# Patient Record
Sex: MALE | Race: WHITE | Employment: OTHER | URBAN - METROPOLITAN AREA
[De-identification: names, ages, dates, MRNs, and addresses within clinical notes are randomized per-mention and may not be internally consistent; named-entity substitution may affect disease eponyms.]

---

## 2020-09-10 NOTE — PERIOP NOTES
Patient denied any COVID-19 symptoms or possible exposure that would require a  pre-procedural COVID-19 test; no COVID-19 test scheduled for the 9/17 Cath Lab procedure.

## 2020-09-11 ENCOUNTER — HOSPITAL ENCOUNTER (OUTPATIENT)
Dept: GENERAL RADIOLOGY | Age: 60
Discharge: HOME | End: 2020-09-11
Payer: COMMERCIAL

## 2020-09-11 DIAGNOSIS — I35.0 NODULAR CALCIFIC AORTIC VALVE STENOSIS: ICD-10-CM

## 2020-09-11 DIAGNOSIS — I25.118 ATHSCL HEART DISEASE OF NATIVE COR ART W OTH ANG PCTRS (HCC): ICD-10-CM

## 2020-09-11 DIAGNOSIS — I10 ESSENTIAL HYPERTENSION, MALIGNANT: ICD-10-CM

## 2020-09-11 PROCEDURE — 71046 X-RAY EXAM CHEST 2 VIEWS: CPT

## 2020-09-17 ENCOUNTER — HOSPITAL ENCOUNTER (OUTPATIENT)
Age: 60
Discharge: HOME | End: 2020-09-17
Attending: INTERNAL MEDICINE | Admitting: INTERNAL MEDICINE
Payer: COMMERCIAL

## 2020-09-17 VITALS
DIASTOLIC BLOOD PRESSURE: 72 MMHG | HEART RATE: 69 BPM | RESPIRATION RATE: 18 BRPM | HEIGHT: 72 IN | WEIGHT: 210 LBS | BODY MASS INDEX: 28.44 KG/M2 | OXYGEN SATURATION: 99 % | TEMPERATURE: 97.9 F | SYSTOLIC BLOOD PRESSURE: 130 MMHG

## 2020-09-17 DIAGNOSIS — R07.9 CHEST PAIN, UNSPECIFIED TYPE: ICD-10-CM

## 2020-09-17 PROCEDURE — C1894 INTRO/SHEATH, NON-LASER: HCPCS | Performed by: INTERNAL MEDICINE

## 2020-09-17 PROCEDURE — 74011250637 HC RX REV CODE- 250/637: Performed by: INTERNAL MEDICINE

## 2020-09-17 PROCEDURE — 77030029065 HC DRSG HEMO QCLOT ZMED -B: Performed by: INTERNAL MEDICINE

## 2020-09-17 PROCEDURE — 74011250636 HC RX REV CODE- 250/636: Performed by: INTERNAL MEDICINE

## 2020-09-17 PROCEDURE — C1769 GUIDE WIRE: HCPCS | Performed by: INTERNAL MEDICINE

## 2020-09-17 PROCEDURE — 99152 MOD SED SAME PHYS/QHP 5/>YRS: CPT | Performed by: INTERNAL MEDICINE

## 2020-09-17 PROCEDURE — 77030004549 HC CATH ANGI DX PRF MRTM -A: Performed by: INTERNAL MEDICINE

## 2020-09-17 PROCEDURE — 74011000250 HC RX REV CODE- 250: Performed by: INTERNAL MEDICINE

## 2020-09-17 PROCEDURE — 77030004521 HC CATH ANGI DX COOK -B: Performed by: INTERNAL MEDICINE

## 2020-09-17 PROCEDURE — 99153 MOD SED SAME PHYS/QHP EA: CPT | Performed by: INTERNAL MEDICINE

## 2020-09-17 PROCEDURE — 93458 L HRT ARTERY/VENTRICLE ANGIO: CPT | Performed by: INTERNAL MEDICINE

## 2020-09-17 PROCEDURE — 99205 OFFICE O/P NEW HI 60 MIN: CPT | Performed by: THORACIC SURGERY (CARDIOTHORACIC VASCULAR SURGERY)

## 2020-09-17 PROCEDURE — 74011000636 HC RX REV CODE- 636: Performed by: INTERNAL MEDICINE

## 2020-09-17 PROCEDURE — C1760 CLOSURE DEV, VASC: HCPCS | Performed by: INTERNAL MEDICINE

## 2020-09-17 PROCEDURE — 77030028837 HC SYR ANGI PWR INJ COEU -A: Performed by: INTERNAL MEDICINE

## 2020-09-17 RX ORDER — ASPIRIN 325 MG
325 TABLET ORAL
COMMUNITY
End: 2020-12-12

## 2020-09-17 RX ORDER — HEPARIN SODIUM 200 [USP'U]/100ML
INJECTION, SOLUTION INTRAVENOUS
Status: COMPLETED | OUTPATIENT
Start: 2020-09-17 | End: 2020-09-17

## 2020-09-17 RX ORDER — SODIUM CHLORIDE 0.9 % (FLUSH) 0.9 %
5-40 SYRINGE (ML) INJECTION EVERY 8 HOURS
Status: DISCONTINUED | OUTPATIENT
Start: 2020-09-17 | End: 2020-09-17 | Stop reason: HOSPADM

## 2020-09-17 RX ORDER — FENTANYL CITRATE 50 UG/ML
INJECTION, SOLUTION INTRAMUSCULAR; INTRAVENOUS AS NEEDED
Status: DISCONTINUED | OUTPATIENT
Start: 2020-09-17 | End: 2020-09-17 | Stop reason: HOSPADM

## 2020-09-17 RX ORDER — MIDAZOLAM HYDROCHLORIDE 1 MG/ML
INJECTION, SOLUTION INTRAMUSCULAR; INTRAVENOUS AS NEEDED
Status: DISCONTINUED | OUTPATIENT
Start: 2020-09-17 | End: 2020-09-17 | Stop reason: HOSPADM

## 2020-09-17 RX ORDER — GUAIFENESIN 100 MG/5ML
81 LIQUID (ML) ORAL ONCE
Status: COMPLETED | OUTPATIENT
Start: 2020-09-17 | End: 2020-09-17

## 2020-09-17 RX ORDER — CHOLECALCIFEROL (VITAMIN D3) 125 MCG
5000 CAPSULE ORAL
COMMUNITY
End: 2020-10-27 | Stop reason: DRUGHIGH

## 2020-09-17 RX ORDER — SODIUM CHLORIDE 9 MG/ML
125 INJECTION, SOLUTION INTRAVENOUS CONTINUOUS
Status: DISPENSED | OUTPATIENT
Start: 2020-09-17 | End: 2020-09-17

## 2020-09-17 RX ORDER — SODIUM CHLORIDE 0.9 % (FLUSH) 0.9 %
5-40 SYRINGE (ML) INJECTION AS NEEDED
Status: DISCONTINUED | OUTPATIENT
Start: 2020-09-17 | End: 2020-09-17 | Stop reason: HOSPADM

## 2020-09-17 RX ORDER — DIPHENHYDRAMINE HYDROCHLORIDE 50 MG/ML
25 INJECTION, SOLUTION INTRAMUSCULAR; INTRAVENOUS
Status: DISCONTINUED | OUTPATIENT
Start: 2020-09-17 | End: 2020-09-17 | Stop reason: HOSPADM

## 2020-09-17 RX ORDER — SOLRIAMFETOL 150 MG/1
1 TABLET, FILM COATED ORAL DAILY
COMMUNITY

## 2020-09-17 RX ORDER — LIDOCAINE HYDROCHLORIDE 10 MG/ML
INJECTION INFILTRATION; PERINEURAL AS NEEDED
Status: DISCONTINUED | OUTPATIENT
Start: 2020-09-17 | End: 2020-09-17 | Stop reason: HOSPADM

## 2020-09-17 RX ORDER — NALOXONE HYDROCHLORIDE 0.4 MG/ML
0.4 INJECTION, SOLUTION INTRAMUSCULAR; INTRAVENOUS; SUBCUTANEOUS AS NEEDED
Status: DISCONTINUED | OUTPATIENT
Start: 2020-09-17 | End: 2020-09-17 | Stop reason: HOSPADM

## 2020-09-17 RX ORDER — BISMUTH SUBSALICYLATE 262 MG
1 TABLET,CHEWABLE ORAL DAILY
COMMUNITY

## 2020-09-17 RX ORDER — ROSUVASTATIN CALCIUM 20 MG/1
20 TABLET, COATED ORAL
COMMUNITY

## 2020-09-17 RX ADMIN — ASPIRIN 81 MG CHEWABLE TABLET 81 MG: 81 TABLET CHEWABLE at 08:54

## 2020-09-17 NOTE — CONSULTS
Consult    NAME: Alejandro Davidson   :  1960   MRN:  [id number]     Date/Time:  2020 12:21 PM    Patient PCP: Brandon Romero MD  ________________________________________________________________________     Assessment:     - Severe AS    - CAD with calcium score in 2017 of 79, ETT 2020 Exercised 7 min 0 sec, cath 2020 mild CAD  - Severe AS, echo  normal EF with severe AS peak of 3.9, mean of 39, by cath mean of 43  - Sinus with MT of 163,   - HTN  - Dyslipidemia  - Family hx of father with MI at 52, 1/2 sister with CAD, no family hx of valvular disease  - Carotid 2020 mild disease bilaterally  - Hypersomnia on Adderall  - ZAKIA/RLS on CPAP  - Hx of back surgery in   - No prior tobacco, no etoh, no drugs  , 4 sons, retired , active, walks, no structured exercise  Cardiolgist:  Cece        Plan:     Progressive dyspnea over the last year, less exercise tolerance, he says he is more aware of this once he was told he had aortic stenosis. Long discussion SAVR vs. TAVR. Discussed mechanical vs bioprosthetic. He appears to be low risk for SAVR. Could consider On-X valve with low dose warfarin, currently trial looking at warfarin alternatives. Would favor surgical AVR with no AI, largest possible valve, root enlargement if necessary, could have future TAVR if needed. Will consult cardiac surgery. [x]        High complexity decision making was performed        Subjective:   CHIEF COMPLAINT: Dyspnea    HISTORY OF PRESENT ILLNESS:     Clarita Harris is a 60 y.o.  male with progressive dyspnea. No chest pain. No presyncope. No edema. We were asked to consult for work up and evaluation of the above problems.      Past Medical History:   Diagnosis Date    Asthma     Hypercholesterolemia     Hypertension     Ill-defined condition     \"Idiopathic hypersomnia\"    Ill-defined condition     restless leg syndrome    Lumbar herniated disc  Sleep apnea       Past Surgical History:   Procedure Laterality Date    HX HEENT      tonsillectomy     Allergies   Allergen Reactions    Keflex [Cephalexin] Hives    Pcn [Penicillins] Hives      Meds:  See below  Social History     Tobacco Use    Smoking status: Never Smoker   Substance Use Topics    Alcohol use: Yes      Family History   Problem Relation Age of Onset    Heart Disease Mother     Heart Disease Father     Heart Disease Paternal Grandfather        REVIEW OF SYSTEMS:     []         Unable to obtain  ROS due to ---   [x]         Total of 12 systems reviewed as follows: Total of 12 systems reviewed as follows:       POSITIVE= Bold text  Negative = normal text  General:  fever, chills, sweats, generalized weakness, weight loss/gain,      loss of appetite   Eyes:    blurred vision, eye pain, loss of vision, double vision  ENT:    rhinorrhea, pharyngitis   Respiratory:   cough, sputum production, SOB, AGUIRRE, wheezing, pleuritic pain   Cardiology:   chest pain, palpitations, orthopnea, PND, edema, syncope   Gastrointestinal:  abdominal pain , N/V, diarrhea, dysphagia, constipation, bleeding   Genitourinary:  frequency, urgency, dysuria, hematuria, incontinence   Muskuloskeletal :  arthralgia, myalgia, back pain  Hematology:  easy bruising, nose or gum bleeding, lymphadenopathy   Dermatological: rash, ulceration, pruritis, color change / jaundice  Endocrine:   hot flashes or polydipsia   Neurological:  headache, dizziness, confusion, focal weakness, paresthesia,     Speech difficulties, memory loss, gait difficulty  Psychological: Feelings of anxiety, depression, agitation    Objective:      Physical Exam:    Last 24hrs VS reviewed since prior progress note.  Most recent are:    Visit Vitals  /78   Pulse 75   Temp 97.9 °F (36.6 °C)   Resp 18   Ht 6' (1.829 m)   Wt 95.3 kg (210 lb)   SpO2 100%   BMI 28.48 kg/m²     No intake or output data in the 24 hours ending 09/17/20 1221     General Appearance: Well developed, well nourished, alert & oriented x 3,    no acute distress. Ears/Nose/Mouth/Throat: Pupils equal and round, Hearing grossly normal.  Neck: Supple. JVP within normal limits. Carotids good upstrokes, with no bruit. Chest: Lungs clear to auscultation bilaterally. Cardiovascular: Regular rate and rhythm, S1S2 normal, III/VI NOHEMY murmur, rubs, gallops. Abdomen: Soft, non-tender, bowel sounds are active. No organomegaly. Extremities: No edema bilaterally. Femoral pulses +2, Distal Pulses +1. Skin: Warm and dry. Neuro: CN II-XII grossly intact, Strength and sensation grossly intact. Data:      Prior to Admission medications    Medication Sig Start Date End Date Taking? Authorizing Provider   aspirin (ASPIRIN) 325 mg tablet Take 325 mg by mouth daily. Yes Provider, Historical   solriamfetoL (Sunosi) 150 mg tab Take  by mouth. Yes Provider, Historical   Cetirizine 10 mg cap Take  by mouth. Yes Provider, Historical   cholecalciferol, vitamin D3, (Vitamin D3) 50 mcg (2,000 unit) tab Take  by mouth. Yes Provider, Historical   multivitamin (ONE A DAY) tablet Take 1 Tab by mouth daily. Yes Provider, Historical   rosuvastatin (CRESTOR) 20 mg tablet Take 20 mg by mouth nightly. Yes Provider, Historical   lisinopril-hydrochlorothiazide (PRINZIDE, ZESTORETIC) 10-12.5 mg per tablet Take 1 Tab by mouth daily. 12/17/10  Yes Luke Mota MD   methylphenidate (RITALIN) 10 mg tablet Take 10 mg by mouth three (3) times daily. 5/7/10  Yes Provider, Historical   montelukast (SINGULAIR) 10 mg tablet Take 10 mg by mouth daily. Yes Provider, Historical   pramipexole (MIRAPEX) 0.125 mg tablet Take 0.125 mg by mouth daily (after lunch). 5/7/10  Yes Provider, Historical   oxyCODONE IR (ROXICODONE) 5 mg immediate release tablet Take 1-2 Tabs by mouth every three (3) hours as needed.  Max Daily Amount: 80 mg. 6/21/16   Pastor Guerrero NP   simvastatin (ZOCOR) 40 mg tablet Take 1 Tab by mouth nightly. 12/17/10   Jesusita Montero MD   pramipexole (MIRAPEX) 0.5 mg tablet Take 0.5 mg by mouth daily (after dinner). 5/7/10   Provider, Historical   albuterol (PROVENTIL, VENTOLIN) 90 mcg/Actuation inhaler Take 2 Puffs by inhalation every six (6) hours as needed. Provider, Historical   LORATADINE (CLARITIN PO) Take  by mouth. Provider, Historical   cpap machine kit by Does Not Apply route. Provider, Historical       No results found for this or any previous visit (from the past 24 hour(s)).

## 2020-09-17 NOTE — Clinical Note
TRANSFER - OUT REPORT:     Verbal report given to: Marian Waite RN. Report consisted of patient's Situation, Background, Assessment and   Recommendations(SBAR). Opportunity for questions and clarification was provided. Patient transported with a Registered Nurse. Patient transported to: [facility].

## 2020-09-17 NOTE — CONSULTS
Patient: Madi Hinson   Age: 60 y.o. Patient Care Team:  David Hyman MD as PCP - General    PCP: David Hyman MD    Cardiologist: Dr. Alessio Cazares    Diagnosis/Reason for Consultation: Chest tightness/Severe Nonrheumatic Aortic Stenosis  Problem List:   Patient Active Problem List   Diagnosis Code    Asthma J45.909    HTN (hypertension) I10    Hypertension I10    Hypercholesterolemia E78.00    Lumbar herniated disc M51.26         HPI: 60 y.o. male with PMHx of AS,CAD, Hypertension, Dyslipidemia, Mild bilateral Carotid Artery Stenosis, Hypersomnia on Adderal,Asthma, ZAKIA/RLS on CPAP and previous back surgery that is referred to the [address] [place] [address] by Dr. Lilliana Montes for interventional evaluation of his severe aortic stenosis. Mr. Vivienne Chanel is  and has four adult children. He is a retired . He has no history of tobacco or alcohol abuse. His family history includes both mother and father with MI and Hypertension    He has been experiencing fatigue and AGUIRRE. He denies chest pain, palpitations, lightheadedness, and syncope. He recently had a treadmill stress test which demonstrated adequate exercise tolerance and increased shortness of breath. He also had noted ST depression. He has followed with a pulmonologist/Sleep Study physician, Dr. Tayo Khan for over 10 years. His wife and the patient describe significant asthma/ZAKIA. He is compliant with his CPAP. He doesn't recall having PFTs in the recent years. He does have a significant history of asthma exacerbations in childhood. I attempted to call Dr. Tayo Khan but his office is closed and they are closed on Fridays. The patient will discuss this on Monday with Dr. Namrata Orr office and see if we can connect. Today he underwent cardiac cath which revealed mild CAD and severe AS. We have been asked to render a surgical opinion.       Heart Failure Admission w/in past year:  No  Heart Failure Admission w/in past 2 weeks:No    NYHA Classification: Class II   Class II (Mild): Slight limitation of physical activity. Comfortable at rest, but ordinary physical activity results in fatigue, palpitation, or dyspnea. Angina Classification: Class 0   Class 0: No symptoms      Past Medical History:   Diagnosis Date    Asthma     Hypercholesterolemia     Hypertension     Ill-defined condition     \"Idiopathic hypersomnia\"    Ill-defined condition     restless leg syndrome    Lumbar herniated disc     Sleep apnea        Endocarditis: No  Pulmonary HTN:  No Greater than 2/3 systemic: no  Immunocompromised/Steroids: no  Liver Dz/Cirrhosis:  If yes, MELD score:  NA    Last Dental Visit:  08/2020  Any dental pain/concerns: Na    Past Surgical History:   Procedure Laterality Date    HX HEENT      tonsillectomy      Social History     Tobacco Use    Smoking status: Never Smoker   Substance Use Topics    Alcohol use: Yes      Family History   Problem Relation Age of Onset    Heart Disease Mother     Heart Disease Father     Heart Disease Paternal Grandfather      Prior to Admission medications    Medication Sig Start Date End Date Taking? Authorizing Provider   aspirin (ASPIRIN) 325 mg tablet Take 325 mg by mouth daily. Yes Provider, Historical   solriamfetoL (Sunosi) 150 mg tab Take  by mouth. Yes Provider, Historical   Cetirizine 10 mg cap Take  by mouth. Yes Provider, Historical   cholecalciferol, vitamin D3, (Vitamin D3) 50 mcg (2,000 unit) tab Take  by mouth. Yes Provider, Historical   multivitamin (ONE A DAY) tablet Take 1 Tab by mouth daily. Yes Provider, Historical   rosuvastatin (CRESTOR) 20 mg tablet Take 20 mg by mouth nightly. Yes Provider, Historical   lisinopril-hydrochlorothiazide (PRINZIDE, ZESTORETIC) 10-12.5 mg per tablet Take 1 Tab by mouth daily. 12/17/10  Yes Herb John MD   methylphenidate (RITALIN) 10 mg tablet Take 10 mg by mouth three (3) times daily.  5/7/10  Yes Provider, Historical montelukast (SINGULAIR) 10 mg tablet Take 10 mg by mouth daily. Yes Provider, Historical   pramipexole (MIRAPEX) 0.125 mg tablet Take 0.125 mg by mouth daily (after lunch). 5/7/10  Yes Provider, Historical   oxyCODONE IR (ROXICODONE) 5 mg immediate release tablet Take 1-2 Tabs by mouth every three (3) hours as needed. Max Daily Amount: 80 mg. 6/21/16   Suzie Chapa NP   simvastatin (ZOCOR) 40 mg tablet Take 1 Tab by mouth nightly. 12/17/10   Ramiro Nolasco MD   pramipexole (MIRAPEX) 0.5 mg tablet Take 0.5 mg by mouth daily (after dinner). 5/7/10   Provider, Historical   albuterol (PROVENTIL, VENTOLIN) 90 mcg/Actuation inhaler Take 2 Puffs by inhalation every six (6) hours as needed. Provider, Historical   LORATADINE (CLARITIN PO) Take  by mouth. Provider, Historical   cpap machine kit by Does Not Apply route. Provider, Historical       Allergies   Allergen Reactions    Keflex [Cephalexin] Hives    Pcn [Penicillins] Hives       Current Medications:   Current Facility-Administered Medications   Medication Dose Route Frequency Provider Last Rate Last Dose    sodium chloride (NS) flush 5-40 mL  5-40 mL IntraVENous Q8H Michael Zhao MD        sodium chloride (NS) flush 5-40 mL  5-40 mL IntraVENous PRN Michael Nevarez MD        naloxone [facility]) injection 0.4 mg  0.4 mg IntraVENous PRN Isabella Vazquez MD        diphenhydrAMINE (BENADRYL) injection 25 mg  25 mg IntraVENous Q4H PRN Isabella Vazquez MD           Vitals: Blood pressure 130/72, pulse 69, temperature 97.9 °F (36.6 °C), resp. rate 18, height 6' (1.829 m), weight 210 lb (95.3 kg), SpO2 99 %. Allergies: is allergic to keflex [cephalexin] and pcn [penicillins].     Review of Systems: Pertinent Positives per HPI   [] Unable to obtain  ROS due to  []mental status change  []sedated   []intubated   [x]Total of 13 systems reviewed as follows:  Constitutional: Positive fatigue,Negative fever, negative chills  Eyes:   Negative for amauroses fugax  ENT:   Negative sore throat,oral absecess  Endocrine Negative for thyroid replacement Rx; goiter; DM  Respiratory:  Negative chronic cough,sputum production  Cards:   Positive AGUIRRE,Negative for palpitations, lower extremity edema, varicosities, claudication  GI:   Negative for dysphagia, bleeding, nausea, vomiting, diarrhea, and abdominal pain  Genitourinary: Negative for frequency, dysuria, BPH   Integument:  Negative for rash and pruritus  Hematologic:  Negative for easy bruising; bleeding dyscarsia  Musculoskel: Negative for muscle weakness inhibiting ambulation  Neurological:  Negative for stroke, TIA, syncope, dizziness  Behavl/Psych: Negative for feelings of anxiety, depression     Cardiovascular Testing:   EK2020  NSR Rate 86  Rosy 163ms  QRSd 91ms    TTE:  2020  LVEF 70%, Severe AS, Mild AI, LAURA not documented, AV Mean Gradient 39 mmHg and AV Velocity is 3.9 m/s, Mild LVH, Pap 29      Cardiac catheterization: 2020  1. Normal LV function. 2. No significant CAD. 3. Known severe AS.     PLAN: Cont evaluation for AoVR; ?TAVR.           Coronary Findings     Diagnostic   Dominance: Right   Left Main    The vessel is large. The vessel is angiographically normal.    Left Anterior Descending    The vessel is large. The vessel exhibits minimal luminal irregularities. 2 medium diagonals; Short LM; LAD best imaged with JL3. Ramus Intermedius    The vessel is angiographically normal. Trivial vessel. Left Circumflex    The vessel is large. The vessel exhibits minimal luminal irregularities. Medium OM1; large OM2; trivial terminal OM. Right Coronary Artery    The vessel is moderate in size. The vessel is angiographically normal. Anterior take-off: imaged with ARmod. Intervention     No interventions have been documented. Left Heart     Left Ventricle  The estimated EF = 60 - 65%. There is no evidence of mitral regurgitation.     Aortic Valve  The aortic valve is calcified Ave mean gradient across AoV: 39 mmHg. Link to printable coronary/vascular diagram report     Coronary/Vascular Diagrams    Phase: Baseline     Data  Systolic  Diastolic  Mean  dP/dt  A Wave  V Wave    LV Pressures  155 mmHg     10 mmHg      1536 mmHg/sec         AO Pressures  111 mmHg     59 mmHg     82 mmHg                Carotid Dopplers: 9-  1-49% BICA    PFTs: FEV1/Predicted:  Not needed  Normal FEV1 > 1 Liter, Predicted = 100%, DLCO > 80%    Mild Lung Disease (60-70% Predicted)    Moderate Lung Disease (50-55% Predicted)    Severe Lung Disease (< 50% Predicted or PO2 < 60 or pCO2>50 on RA)        Physical Exam:  General: Well nourished well groomed male appearing stated age  Neuro: A&OX3. BRAVO. PERRL. Steady unassisted gait  Head:Normocephalic. Atraumatic. Symmetrical  Neck: Trachea Midline  Resp: CTA B. No Adv BS/cough/sputum/tachypnea with seated conversation  CV: S1S2 RRR. NOHEMY II/VI. No JVD/carotid bruits. Pink/warm/dry extremities. No LE peripheral edema  GI:Benign ab. Soft. NT/ND. Active BS  : Voids  Integ: No obvious s/s of infection or breakdown  Musculo/Skeletal: FROM in all major joints. normal muscle tone      STS 2.9 Risk Score / Predicted 30 day mortality: -   Procedure: Isolated AVR CALCULATE   Risk of Mortality:  0.777%    Renal Failure:  1.183%    Permanent Stroke:  0.715%    Prolonged Ventilation:  2.897%    DSW Infection:  0.063%    Reoperation:  4.010%    Morbidity or Mortality:  6.043%    Short Length of Stay:  65.846%    Long Length of Stay:  1.411%         Assessment/Plan:   1. Nonrheumatic Aortic Stenosis - severe and symptomatic. AV Mean Gradient 39 mmHg, AV Peak Velocity 3.9ms. LAURA not available - will get records from S. We need to clarify lung disease as he and his wife state this is significant and his asthma was a problem in his younger years. I attempted to call Dr. Samantha Leahy ([phone]) but his office was closed. They are closed Friday as well.  The patient will reach out to him to see if we can connect and discuss further. He needs PFTs as well. If his pulmonary disease is mild to moderate, he would likely get SAVR due to age. With significant pulmonary disease, TAVR may be more appropriate. We discussed these options as well as that Dr. Iraida Puga also performs Mini AVR and he can best address this at his office visit with us. We will make an appt for him once we have spoken with Dr. Annmarie Cuadra and gotten PFTs. 2. CAD -Minimal by cath, ASA, Statin,No BB  3, Bilateral Carotid Artery Stenosis - Mild  4. Dyslipidemia - Zocor  5. Asthma/ZAKIA/RLS - CPAP machine, sees    Singulair, Zyrtec and Mirapex  6. Hypertension - Lisinopril/HCTZ  7. Hypersomnia - Adderal  8. Abnormal Chest Xray - Will need CT scan to evaluate pulmonary nodule. Will wait to see if CTA is appropriate for AVR and it can address both issues.   9. Further plan/care by Dr. Iraida Puga

## 2020-09-17 NOTE — PROGRESS NOTES
9/17/2020 11:52 AM  Patient without complaints. Last VS:   Visit Vitals  /73   Pulse 76   Temp 97.9 °F (36.6 °C)   Resp 18   Ht 6' (1.829 m)   Wt 95.3 kg (210 lb)   SpO2 100%   BMI 28.48 kg/m²     Cath site without hematoma, bleeding or new bruit. Distal pulses at baseline. Continue current plan of care. Results of cath discussed with patient and his wife. Abnl CXR: will need CT; will set up as outpt. D/W Dr La Hood; age may be limiting factor for TAVR.

## 2020-09-17 NOTE — DISCHARGE INSTRUCTIONS
[address]    [phone]  [place], [place] [address]    [email or web address]    Patient Discharge Instructions    Angela Jackson / [id number] : 1960    Admitted 2020 Discharged 2020     · It is important that you take the medication exactly as they are prescribed. · Keep your medication in the bottles provided by the pharmacist and keep a list of the medication names, dosages, and times to be taken in your wallet. · Do not take other medications without consulting your doctor. BRING ALL OF YOUR MEDICINES or a list of medicines with dosages TO YOUR OFFICE VISIT with Dr. Karina Taylor. Cardiac Catheterization  Discharge Instructions     Do not drive, operate any machinery, or sign any legal documents for 24 hours after your procedure. You must have someone to drive you home.  You may take a shower 24 hours after your cardiac catheterization. Be sure to get the dressing wet and then remove it; gently wash the area with warm soapy water. Pat dry and leave open to air. To help prevent infections, be sure to keep the cath site clean and dry. No lotions, creams, powders, ointments, etc. in the cath site for approximately 1 week.  Do not take a tub bath, get in a hot tub or swimming pool for approximately 5 days or until the cath site is completely healed.  No strenuous activity or heavy lifting over 10 lbs. for 7 days.  Drink plenty of fluids for 24-48 hours after your cath to flush the contrast dye from your kidneys. No alcoholic beverages for 24 hours. You may resume your previous diet (low fat, low cholesterol) after your cath.  After your cath, some bruising or discomfort is common during the healing process. Tylenol, 1-2 tablets every 6 hours as needed, is recommended if you experience any discomfort.   If you experience any signs or symptoms of infection such as fever, chills, or poorly healing incision, persistent tenderness or swelling in the groin, redness and/or warmth to the touch, numbness, significant tingling or pain at the groin site or affected extremity, rash, drainage from the cath site, or if the leg feels tight or swollen, call your physician right away.  If bleeding at the cath site occurs, take a clean gauze pad and apply direct pressure to the groin just above the puncture site. Call 911 immediately, and continue to apply direct pressure until an ambulance gets to your location.  You may return to work  2  days after your cardiac cath if no bleeding at the cath site. If you are smoking, please stop. Smoking Cessation Program is a free, phone/text/email based, smoking cessation program. The program is individualized to meet each patient's needs. To enroll use this link https://[employer]iba/ra/survey/2860      Follow-up:   Follow-up Information     Follow up With Specialties Details Why Contact Info    Yi Vuong MD [facility]   [address] [facility]  [id number] [place] [address] Cardiovascular Specialists   as they direct for further evalation of aortic valve procedure. [address]  [facility] [address]          Information obtained by :  I understand that if any problems occur once I am at home I am to contact my physician. I understand and acknowledge receipt of the instructions indicated above.                                                                                                                                            R.N.'s Signature                                                                  Date/Time                                                                                                                                              Patient or Representative Signature                                                          Date/Time      Amanda Zayas MD      [address] [phone]) [phone], [address]    [email or web address]

## 2020-09-17 NOTE — PROGRESS NOTES
CSS   History and Physical    Subjective:      Dario Rogers is a 60 y.o. male who was referred for cardiac evaluation following worsening shortness of breath over the last 6 months. MD Zhao requested consult for possible AVR/TAVR. Patient is seen sitting in the bed in the IVCU after ambulating twice down and back the hallway. Patient endorses worries over this issues d/t not being able to cut his grass like he used to. This began worsening since June. Denies chest pain, tightness, and shortness of breath. Denies swelling in the legs. Patient endorses increased fatigue and feeling \"whipped. \"      PCP Shelbi Burt. Ulysses Costello MD    Cardiologist: Adela Robledo MD    Cardiac Testing    Cardiac catheterization:     Left Main    The vessel is large. The vessel is angiographically normal.    Left Anterior Descending    The vessel is large. The vessel exhibits minimal luminal irregularities. 2 medium diagonals; Short LM; LAD best imaged with JL3. Ramus Intermedius    The vessel is angiographically normal. Trivial vessel. Left Circumflex    The vessel is large. The vessel exhibits minimal luminal irregularities. Medium OM1; large OM2; trivial terminal OM. Right Coronary Artery    The vessel is moderate in size. The vessel is angiographically normal. Anterior take-off: imaged with ARmod. Left Ventricle  The estimated EF = 60 - 65%. There is no evidence of mitral regurgitation. Aortic Valve  The aortic valve is calcified Ave mean gradient across AoV: 39 mmHg.           ECHO:     Last ECHO 6/02/20 70%, Severe AS, Mild AI    Past Medical History:   Diagnosis Date    Asthma     Hypercholesterolemia     Hypertension     Ill-defined condition     \"Idiopathic hypersomnia\"    Ill-defined condition     restless leg syndrome    Lumbar herniated disc     Sleep apnea      Past Surgical History:   Procedure Laterality Date    HX HEENT      tonsillectomy      Social History     Tobacco Use    Smoking status: Never Smoker   Substance Use Topics    Alcohol use: Yes      Family History   Problem Relation Age of Onset    Heart Disease Mother     Heart Disease Father     Heart Disease Paternal Grandfather      Prior to Admission medications    Medication Sig Start Date End Date Taking? Authorizing Provider   aspirin (ASPIRIN) 325 mg tablet Take 325 mg by mouth daily. Yes Provider, Historical   solriamfetoL (Sunosi) 150 mg tab Take  by mouth. Yes Provider, Historical   Cetirizine 10 mg cap Take  by mouth. Yes Provider, Historical   cholecalciferol, vitamin D3, (Vitamin D3) 50 mcg (2,000 unit) tab Take  by mouth. Yes Provider, Historical   multivitamin (ONE A DAY) tablet Take 1 Tab by mouth daily. Yes Provider, Historical   rosuvastatin (CRESTOR) 20 mg tablet Take 20 mg by mouth nightly. Yes Provider, Historical   lisinopril-hydrochlorothiazide (PRINZIDE, ZESTORETIC) 10-12.5 mg per tablet Take 1 Tab by mouth daily. 12/17/10  Yes Giorgio Ya MD   methylphenidate (RITALIN) 10 mg tablet Take 10 mg by mouth three (3) times daily. 5/7/10  Yes Provider, Historical   montelukast (SINGULAIR) 10 mg tablet Take 10 mg by mouth daily. Yes Provider, Historical   pramipexole (MIRAPEX) 0.125 mg tablet Take 0.125 mg by mouth daily (after lunch). 5/7/10  Yes Provider, Historical   oxyCODONE IR (ROXICODONE) 5 mg immediate release tablet Take 1-2 Tabs by mouth every three (3) hours as needed. Max Daily Amount: 80 mg. 6/21/16   Alvarado Villegas NP   simvastatin (ZOCOR) 40 mg tablet Take 1 Tab by mouth nightly. 12/17/10   Giorgio Ya MD   pramipexole (MIRAPEX) 0.5 mg tablet Take 0.5 mg by mouth daily (after dinner). 5/7/10   Provider, Historical   albuterol (PROVENTIL, VENTOLIN) 90 mcg/Actuation inhaler Take 2 Puffs by inhalation every six (6) hours as needed. Provider, Historical   LORATADINE (CLARITIN PO) Take  by mouth. Provider, Historical   cpap machine kit by Does Not Apply route.     Provider, Historical Allergies   Allergen Reactions    Keflex [Cephalexin] Hives    Pcn [Penicillins] Hives       Denies excessive ETOH use. Denies former/current tobacco use. Denies recreational drug use. Screen for family history     Father: Sudden cardiac death, MI, CAD, HTN  Mother: CAD, MI, HTN    Review of Systems:   Consititutional: Denies fever or chills. Eyes:  Uses glasses daily. ENT:  Denies hearing or swallowing difficulty. CV: Denies CP, claudication, HTN. Increased fatigue  Resp: Denies dyspnea, productive cough. CPAP at night. Significant asthma history  : Denies dialysis or kidney problems. GI: Denies ulcers, esophageal strictures, liver problems. M/S: Denies joint or bone problems, or implanted artificial hardware. Skin: Denies varicose veins, edema. Neuro: Denies strokes, or TIAs. Psych: Denies anxiety or depression. Endocrine: Denies thyroid problems or diabetes. Heme/Lymphatic: Denies easy bruising or lymphedema. Objective:     VS:     Physical Exam:    General appearance: alert, cooperative, no distress  Head: normocephalic, without obvious abnormality; atraumatic  Eyes: conjunctivae/corneas clear; EOM's intact. Nose: nares normal; no drainage. Neck: no carotid bruit and no JVD  Lungs: clear to auscultation bilaterally  Heart: regular rate and rhythm; Grade 2 Murmur heard  Abdomen: soft, non-tender; bowel sounds normal  Extremities: moves all extremities; no weakness. Skin: Skin color normal; No varicose veins or edema. Neurologic: Grossly normal      Labs:     WBC 6.0  HGB 14.5  HCT 45.1      K 4.2  BUN 17  CREA 1.03  GLU 96      Diagnostics:   PA and lateral: Right upper lobe nodularity seen. Carotid doppler: Bilateral 1-49% ICA stenosis with with mild hemodynamic stability.        EKG:     NSR     QRS 70  QT/ / 374    Assessment:     Severe AS - Grade 2 on ausculation  HTN - continue BP meds  HLD - on statin  Hypersomnia- continue home meds  ZAKIA/RLS - on CPAP     with four sons. Retired . Active until recent issues with worsening fatigue. Plan:   The risk and benefit of surgery were reviewed with patient and family and all questions answered and the patient wishes to proceed. Risk include infection, bleeding, stroke, heart attack, irregular heart rhythm, kidney failure and death. Patient to communicate with Pulmonologist for PFTs to be regarding significant asthma history. Risk of Mortality:0.715%  Renal Failure:0.933%  Permanent Stroke:0.609%  Prolonged Ventilation:3.521%  DSW Infection:0.062%  Reoperation:4.774%  Morbidity or Mortality:7.323%  Short Length of Stay:60.799%  Long Length of Stay:2.278%    Treatment Plan:    1. Plan to speak with pulmonologist about PFTs  2.  See in clinic following PFTs to evaluate possible TAVR/AVR         Signed By: Feng Rapp     September 17, 2020

## 2020-09-17 NOTE — PROGRESS NOTES
Discharge instructions reviewed with pt and his wife to their satisfaction. Signed copy on pt's ppr chart and original given to pt. No new hard scripts. Pt provided with cath site instructions. Iv/tele removed. Pt d/c'd to home.

## 2020-09-21 DIAGNOSIS — R06.09 DOE (DYSPNEA ON EXERTION): Primary | ICD-10-CM

## 2020-09-23 ENCOUNTER — HOSPITAL ENCOUNTER (OUTPATIENT)
Dept: CT IMAGING | Age: 60
Discharge: HOME | End: 2020-09-23
Attending: INTERNAL MEDICINE
Payer: COMMERCIAL

## 2020-09-23 DIAGNOSIS — R91.1 LUNG NODULE: ICD-10-CM

## 2020-09-23 PROCEDURE — 71250 CT THORAX DX C-: CPT

## 2020-09-26 ENCOUNTER — HOSPITAL ENCOUNTER (OUTPATIENT)
Dept: PREADMISSION TESTING | Age: 60
Discharge: HOME | End: 2020-09-26
Payer: COMMERCIAL

## 2020-09-26 PROCEDURE — 87635 SARS-COV-2 COVID-19 AMP PRB: CPT

## 2020-09-27 LAB
HEALTH STATUS, XMCV2T: NORMAL
SARS-COV-2, COV2NT: NOT DETECTED
SOURCE, COVRS: NORMAL
SPECIMEN SOURCE, FCOV2M: NORMAL
SPECIMEN TYPE, XMCV1T: NORMAL

## 2020-09-30 ENCOUNTER — HOSPITAL ENCOUNTER (OUTPATIENT)
Dept: PULMONOLOGY | Age: 60
Discharge: HOME | End: 2020-09-30

## 2020-09-30 ENCOUNTER — OFFICE VISIT (OUTPATIENT)
Dept: CARDIOTHORACIC SURGERY | Age: 60
End: 2020-09-30
Payer: COMMERCIAL

## 2020-09-30 VITALS
WEIGHT: 211.5 LBS | RESPIRATION RATE: 18 BRPM | SYSTOLIC BLOOD PRESSURE: 142 MMHG | TEMPERATURE: 98.1 F | OXYGEN SATURATION: 99 % | HEART RATE: 85 BPM | DIASTOLIC BLOOD PRESSURE: 88 MMHG | BODY MASS INDEX: 28.68 KG/M2

## 2020-09-30 DIAGNOSIS — I10 ESSENTIAL HYPERTENSION: ICD-10-CM

## 2020-09-30 DIAGNOSIS — J45.40 MODERATE PERSISTENT ASTHMA, UNSPECIFIED WHETHER COMPLICATED: ICD-10-CM

## 2020-09-30 DIAGNOSIS — R06.09 DOE (DYSPNEA ON EXERTION): ICD-10-CM

## 2020-09-30 DIAGNOSIS — G47.33 OSA ON CPAP: ICD-10-CM

## 2020-09-30 DIAGNOSIS — I35.0 NONRHEUMATIC AORTIC VALVE STENOSIS: Primary | ICD-10-CM

## 2020-09-30 DIAGNOSIS — Z99.89 OSA ON CPAP: ICD-10-CM

## 2020-09-30 DIAGNOSIS — G47.10 HYPERSOMNIA: ICD-10-CM

## 2020-09-30 DIAGNOSIS — E78.00 HYPERCHOLESTEROLEMIA: ICD-10-CM

## 2020-09-30 PROCEDURE — 99215 OFFICE O/P EST HI 40 MIN: CPT | Performed by: THORACIC SURGERY (CARDIOTHORACIC VASCULAR SURGERY)

## 2020-09-30 NOTE — PROGRESS NOTES
Patient: Francisco Alvarado II   Age: 63 y.o. Patient Care Team:  Naty Sinclair MD as PCP - General  Lynn Moscoso MD (Cardiology)  Turner Russell MD (Cardiothoracic Surgery)    PCP: Naty Sinclair MD    Cardiologist: Dr. Marcos Gonzalez    Diagnosis/Reason for Consultation: The primary encounter diagnosis was Nonrheumatic aortic valve stenosis. Diagnoses of Essential hypertension, Hypercholesterolemia, Moderate persistent asthma, unspecified whether complicated, Hypersomnia, and ZAKIA on CPAP were also pertinent to this visit. Problem List:   Patient Active Problem List   Diagnosis Code    Asthma J45.909    HTN (hypertension) I10    Hypertension I10    Hypercholesterolemia E78.00    Lumbar herniated disc M51.26         HPI: 60 y.o. male with PMHx of AS,CAD, Hypertension, Dyslipidemia, Mild bilateral Carotid Artery Stenosis, Hypersomnia on Adderal,Asthma, ZAKIA/RLS on CPAP and previous back surgery that is referred to the [address] [place] [address] by Dr. Scooter Elizabeth for interventional evaluation of his severe aortic stenosis.     Mr. Willa Luciano is  and has four adult children. He is a retired .      He has no history of tobacco or alcohol abuse. His family history includes both mother and father with MI and Hypertension     He has been experiencing fatigue and AGUIRRE. He denies chest pain, palpitations, lightheadedness, and syncope. He recently had a treadmill stress test which demonstrated adequate exercise tolerance and increased shortness of breath. He also had noted ST depression.      He has followed with a pulmonologist/Sleep Study physician, Dr. Laurel Morfin for over 10 years. His wife and the patient describe significant asthma/ZAKIA. He is compliant with his CPAP. He doesn't recall having PFTs in the recent years. He does have a significant history of asthma exacerbations in childhood.     I attempted to call Dr. Laurel Morfin but his office is closed and they are closed on Fridays.  The patient will discuss this on Monday with Dr. Rodney  office and see if we can connect.     He had cardiac cath in early September which revealed mild CAD and severe AS. He was noted to have an abnormal chest xray which elicited a Chest CT which came back with :    \"The lungs are abnormal. The dominant abnormality is in the posterior segment  of the right upper lobe where there is a part solid part cystic lesion that is  associated with volume loss and bronchiectasis. There is solid components as  well as irregular margins. Though these findings could represent  postinflammatory change neoplastic process could have a very similar appearance. Further evaluation is suggested. For this lesion PET CT scan or bronchoscopy may  yield more information. 3. Other small nodules as described above bilaterally measuring up to 7 mm as  well as numerous micronodules also need further evaluation at some point. Close  follow-up with CT is suggested\"     This finding complicates his course and how to proceed. He has an appointment with Dr. Nura Alston on Monday for next steps.       Heart Failure Admission w/in past year:  No  Heart Failure Admission w/in past 2 weeks:No     NYHA Classification: Class II              Class II (Mild): Slight limitation of physical activity.  Comfortable at rest, but ordinary physical activity results in fatigue, palpitation, or dyspnea.                   Angina Classification: Class 0              Class 0: No symptoms             Past Medical History:   Diagnosis Date    Asthma     Hypercholesterolemia     Hypertension     Ill-defined condition     \"Idiopathic hypersomnia\"    Ill-defined condition     restless leg syndrome    Lumbar herniated disc     Sleep apnea           Endocarditis: No  Pulmonary HTN:  No Greater than 2/3 systemic: no  Immunocompromised/Steroids: no  Liver Dz/Cirrhosis:  If yes, MELD score:  NA     Last Dental Visit:  08/2020  Any dental pain/concerns: Na    Past Surgical History:   Procedure Laterality Date    HX HEENT      tonsillectomy      Social History     Tobacco Use    Smoking status: Never Smoker   Substance Use Topics    Alcohol use: Yes      Family History   Problem Relation Age of Onset    Heart Disease Mother     Heart Disease Father     Heart Disease Paternal Grandfather      Prior to Admission medications    Medication Sig Start Date End Date Taking? Authorizing Provider   aspirin (ASPIRIN) 325 mg tablet Take 325 mg by mouth daily. Yes Provider, Historical   solriamfetoL (Sunosi) 150 mg tab Take  by mouth. Yes Provider, Historical   Cetirizine 10 mg cap Take  by mouth. Yes Provider, Historical   cholecalciferol, vitamin D3, (Vitamin D3) 50 mcg (2,000 unit) tab Take 5,000 Units by mouth. Yes Provider, Historical   multivitamin (ONE A DAY) tablet Take 1 Tab by mouth daily. Yes Provider, Historical   rosuvastatin (CRESTOR) 20 mg tablet Take 20 mg by mouth nightly. Yes Provider, Historical   lisinopril-hydrochlorothiazide (PRINZIDE, ZESTORETIC) 10-12.5 mg per tablet Take 1 Tab by mouth daily. 12/17/10  Yes Sandeep Winslow MD   methylphenidate (RITALIN) 10 mg tablet Take 20 mg by mouth four (4) times daily. 5/7/10  Yes Provider, Historical   pramipexole (MIRAPEX) 0.5 mg tablet Take 0.5 mg by mouth daily (after dinner). 5/7/10  Yes Provider, Historical   montelukast (SINGULAIR) 10 mg tablet Take 10 mg by mouth daily. Yes Provider, Historical   pramipexole (MIRAPEX) 0.125 mg tablet Take 0.125 mg by mouth daily (after lunch). 5/7/10  Yes Provider, Historical   cpap machine kit by Does Not Apply route. Yes Provider, Historical   oxyCODONE IR (ROXICODONE) 5 mg immediate release tablet Take 1-2 Tabs by mouth every three (3) hours as needed. Max Daily Amount: 80 mg. 6/21/16   Carolann Posey NP   simvastatin (ZOCOR) 40 mg tablet Take 1 Tab by mouth nightly.  12/17/10   Sandeep Winslow MD   albuterol (PROVENTIL, VENTOLIN) 90 mcg/Actuation inhaler Take 2 Puffs by inhalation every six (6) hours as needed. Provider, Historical   LORATADINE (CLARITIN PO) Take  by mouth. Provider, Historical       Allergies   Allergen Reactions    Keflex [Cephalexin] Hives    Pcn [Penicillins] Hives       Current Medications:   Current Outpatient Medications   Medication Sig Dispense Refill    aspirin (ASPIRIN) 325 mg tablet Take 325 mg by mouth daily.  solriamfetoL (Sunosi) 150 mg tab Take  by mouth.  Cetirizine 10 mg cap Take  by mouth.  cholecalciferol, vitamin D3, (Vitamin D3) 50 mcg (2,000 unit) tab Take 5,000 Units by mouth.  multivitamin (ONE A DAY) tablet Take 1 Tab by mouth daily.  rosuvastatin (CRESTOR) 20 mg tablet Take 20 mg by mouth nightly.  lisinopril-hydrochlorothiazide (PRINZIDE, ZESTORETIC) 10-12.5 mg per tablet Take 1 Tab by mouth daily. 90 Tab 3    methylphenidate (RITALIN) 10 mg tablet Take 20 mg by mouth four (4) times daily.  pramipexole (MIRAPEX) 0.5 mg tablet Take 0.5 mg by mouth daily (after dinner).  montelukast (SINGULAIR) 10 mg tablet Take 10 mg by mouth daily.  pramipexole (MIRAPEX) 0.125 mg tablet Take 0.125 mg by mouth daily (after lunch).  cpap machine kit by Does Not Apply route.  oxyCODONE IR (ROXICODONE) 5 mg immediate release tablet Take 1-2 Tabs by mouth every three (3) hours as needed. Max Daily Amount: 80 mg. 60 Tab 0    simvastatin (ZOCOR) 40 mg tablet Take 1 Tab by mouth nightly. 90 Tab 3    albuterol (PROVENTIL, VENTOLIN) 90 mcg/Actuation inhaler Take 2 Puffs by inhalation every six (6) hours as needed.  LORATADINE (CLARITIN PO) Take  by mouth. Vitals: Blood pressure (!) 142/88, pulse 85, temperature 98.1 °F (36.7 °C), temperature source Oral, resp. rate 18, weight 211 lb 8 oz (95.9 kg), SpO2 99 %. Allergies: is allergic to keflex [cephalexin] and pcn [penicillins].     Review of Systems: Pertinent Positives per HPI   [] Unable to obtain  ROS due to  []mental status change  []sedated   []intubated   [x]Total of 13 systems reviewed as follows:  Constitutional: Positive fatigue,Negative fever, negative chills  Eyes:               Negative for amauroses fugax  ENT:                Negative sore throat,oral absecess  Endocrine        Negative for thyroid replacement Rx; goiter; DM  Respiratory:     Negative chronic cough,sputum production  Cards:              Positive AGUIRRE,Negative for palpitations, lower extremity edema, varicosities, claudication  GI:                   Negative for dysphagia, bleeding, nausea, vomiting, diarrhea, and abdominal pain  Genitourinary: Negative for frequency, dysuria, BPH   Integument:     Negative for rash and pruritus  Hematologic:   Negative for easy bruising; bleeding dyscarsia  Musculoskel:   Negative for muscle weakness inhibiting ambulation  Neurological:   Negative for stroke, TIA, syncope, dizziness  Behavl/Psych: Negative for feelings of anxiety, depression      Cardiovascular Testing:   EK2020  NSR Rate 86  Rosy 163ms  QRSd 91ms     TTE:  2020  LVEF 70%, Severe AS, Mild AI, LAURA not documented, AV Mean Gradient 39 mmHg and AV Velocity is 3.9 m/s, Mild LVH, Pap 29        Cardiac catheterization: 2020  1. Normal LV function. 2. No significant CAD. 3. Known severe AS.     PLAN: Cont evaluation for AoVR; ?TAVR.           Coronary Findings      Diagnostic   Dominance: Right   Left Main    The vessel is large. The vessel is angiographically normal.    Left Anterior Descending    The vessel is large. The vessel exhibits minimal luminal irregularities. 2 medium diagonals; Short LM; LAD best imaged with JL3. Ramus Intermedius    The vessel is angiographically normal. Trivial vessel. Left Circumflex    The vessel is large. The vessel exhibits minimal luminal irregularities. Medium OM1; large OM2; trivial terminal OM. Right Coronary Artery    The vessel is moderate in size.  The vessel is angiographically normal. Anterior take-off: imaged with ARmod. Intervention      No interventions have been documented. Left Heart      Left Ventricle  The estimated EF = 60 - 65%. There is no evidence of mitral regurgitation. Aortic Valve  The aortic valve is calcified Ave mean gradient across AoV: 39 mmHg. Link to printable coronary/vascular diagram report      Coronary/Vascular Diagrams    Phase: Baseline      Data  Systolic  Diastolic  Mean  dP/dt  A Wave  V Wave    LV Pressures  155 mmHg     10 mmHg       1536 mmHg/sec           AO Pressures  111 mmHg     59 mmHg     82 mmHg                      Carotid Dopplers: 9-  1-49% BICA     PFTs: FEV1/Predicted: FEV1 3.08 (90% predicted)  FVC 4.59 )94%predicted)  DLCO 28.9 (111% predicted)  Normal FEV1 > 1 Liter, Predicted = 100%, DLCO > 80%                          Mild Lung Disease (60-70% Predicted)                          Moderate Lung Disease (50-55% Predicted)                          Severe Lung Disease (< 50% Predicted or PO2 < 60 or pCO2>50 on RA)        [place] C/A/P CTA: will order dependent on plan    Chest CT: 9-  FINDINGS:     THYROID: No nodule. MEDIASTINUM: No mass or lymphadenopathy. GODFREY: No mass or lymphadenopathy. THORACIC AORTA: No aneurysm. There is severe calcification of the aortic valve. This is much greater than expected for patient this age and this suggest aortic  stenosis. Further evaluation is  MAIN PULMONARY ARTERY: Normal in caliber. TRACHEA/BRONCHI: Patent. ESOPHAGUS: No wall thickening or dilatation. HEART: Normal in size. PLEURA: No effusion or pneumothorax. LUNGS:      The lungs are abnormal.     The chest radiographic abnormality corresponds with a parenchymal opacity  posteriorly in the right upper lobe adjacent to the fissure this measures 5.1 x  3.3 x 1.7 cm and is associated with cystic and solid areas as well as areas of  bronchiectasis and volume loss.  This is predominantly the posterior segment of  the right upper lobe this causes displacement of the adjacent lung consistent  with scarring and cicatrization. There is bronchiectasis coursing through this  area. . This has irregular margins. The more solid areas are seen along the  superior and medial aspect measure up to 1 cm in size.     There is pleural-parenchymal scarring at each lung apex.     Medially in the right upper lobe on image 13 is a 7 mm nodule. There is a  smaller nodule just posterior to this. There is extensive pleural nodularity in  the right upper lobe near the apex as well as posteriorly. The left upper lobe near the apex medially is a 6 mm nodule. There is a 7 mm  nodule laterally in left upper lobe on image 27.     In both lower lobes there are numerous clustered micronodules measuring  approximately 1 mm in size.        INCIDENTALLY IMAGED UPPER ABDOMEN: There is a small hiatal hernia. No adrenal  lesions. [place] Main BONES: No destructive bone lesion. There is mild wedging T3 vertebral body.     IMPRESSION  IMPRESSION:     1. There is severe aortic valvular calcification. This is consistent with aortic  valvular stenosis. Further evaluation is necessary. 2. The lungs are abnormal. The dominant abnormality is in the posterior segment  of the right upper lobe where there is a part solid part cystic lesion that is  associated with volume loss and bronchiectasis. There is solid components as  well as irregular margins. Though these findings could represent  postinflammatory change neoplastic process could have a very similar appearance. Further evaluation is suggested. For this lesion PET CT scan or bronchoscopy may  yield more information. 3. Other small nodules as described above bilaterally measuring up to 7 mm as  well as numerous micronodules also need further evaluation at some point. Close  follow-up with CT is suggested. 23X     Physical Exam:  General: Well nourished well groomed male appearing stated age  Neuro: A&OX3. BRAVO. PERRL.  Steady unassisted gait  Head:Normocephalic. Atraumatic. Symmetrical  Neck: Trachea Midline  Resp: CTA B. No Adv BS/cough/sputum/tachypnea with seated conversation  CV: S1S2 RRR. NOHEMY II/VI. No JVD/carotid bruits. Pink/warm/dry extremities. No LE peripheral edema  GI:Benign ab. Soft. NT/ND. Active BS  : Voids  Integ: No obvious s/s of infection or breakdown  Musculo/Skeletal: FROM in all major joints. normal muscle tone     Clinic Evaluation:   KCCQ-12: scanned into EMR/completed today    5 meter gait: 3.62 seconds    Heydi Flood Survey:  [facility] Index ADL - 6/6  scanned into EMR/completed today     STS 2.9 Risk Score / Predicted 30 day mortality: -   Procedure: Isolated AVR CALCULATE   Risk of Mortality:  0.777%    Renal Failure:  1.183%    Permanent Stroke:  0.715%    Prolonged Ventilation:  2.897%    DSW Infection:  0.063%    Reoperation:  4.010%    Morbidity or Mortality:  6.043%    Short Length of Stay:  65.846%    Long Length of Stay:  1.411%                Assessment/Plan:   1. Nonrheumatic Aortic Stenosis - severe and symptomatic. AV Mean Gradient 39 mmHg, AV Peak Velocity 3.9ms. LAURA 0.8cm2.. Dr. Namrata Barrera spoke with Dr. Ernesto Magana ([phone]) his Pulmonologist.. His PFTs are good. If his testing reveals a malignancy, we can discuss transcatheter options. He would best be served by SAVR due to his age. We will wait for his work up with pulm. 2. CAD -Minimal by cath, ASA, Statin,No BB  3, Bilateral Carotid Artery Stenosis - Mild  4. Dyslipidemia - Zocor  5. Asthma/ZAKIA/RLS - CPAP machine, sees    Singulair, Zyrtec and Mirapex  6. Hypertension - Lisinopril/HCTZ  7. Hypersomnia - Adderal  8. Abnormal Chest Xray/Chest CT - Chest CT concerning for RUL process. Will need further workup with Dr. Ernesto Magana. 9. Further plan/care by Dr. Marilia Pagan     Pt seen and examined. Agree with NP Natan Adkins consultation note. He is a good candidate for open AVR. We will have his lung nodules further evaluated and go from there. He is amenable to open surgery.  We discussed the risks and benefits and he agreed to proceed. He also preferred a bioprosthetic tissue valve.

## 2020-10-14 ENCOUNTER — TELEPHONE (OUTPATIENT)
Dept: CARDIOTHORACIC SURGERY | Age: 60
End: 2020-10-14

## 2020-10-14 NOTE — TELEPHONE ENCOUNTER
Dr. Nadine Mccarthy (pulmonologist) called and has seen Mr. Benja Resendiz. He feels his abnormal CT imaging is likely due to previous severe respiratory infection. He sent an AFB off for due diligence and that is pending. He does not feel there is any reason we can proceed with AVR.

## 2020-10-16 NOTE — PROCEDURES
[address]  PULMONARY FUNCTION TEST    Name:  Kandi Perez  MR#:  [id number]  :  1960  ACCOUNT #:  [de-identified]  DATE OF SERVICE:  2020    REASON FOR THE TEST:  Coronary artery disease. Spirometry and lung volumes were performed and they reveal:  1. Very mild airflow obstruction. 2.  No restrictive lung disease. 3.  Air trapping is present. 4.  Normal DLCO. 5.  Flow-volume loop is consistent with airflow obstruction.       Navid Skinner MD      [id number]  D:  10/16/2020 10:35  T:  10/16/2020 13:28  JOB #:  [id number]  CC:  Antonino Dickson NP

## 2020-10-20 ENCOUNTER — TELEPHONE (OUTPATIENT)
Dept: CARDIOTHORACIC SURGERY | Age: 60
End: 2020-10-20

## 2020-10-20 NOTE — TELEPHONE ENCOUNTER
I called Mr. Elpidio Dorsey. We discussed the plan of open AVR. We reviewed the risks of surgery and the expectations for PAT and postoperative care. He is agreeable to proceed.

## 2020-10-21 DIAGNOSIS — I35.0 AORTIC VALVE STENOSIS, ETIOLOGY OF CARDIAC VALVE DISEASE UNSPECIFIED: Primary | ICD-10-CM

## 2020-10-27 ENCOUNTER — HOSPITAL ENCOUNTER (OUTPATIENT)
Dept: PREADMISSION TESTING | Age: 60
Discharge: HOME | End: 2020-10-27
Attending: THORACIC SURGERY (CARDIOTHORACIC VASCULAR SURGERY)
Payer: COMMERCIAL

## 2020-10-27 VITALS
BODY MASS INDEX: 28.93 KG/M2 | TEMPERATURE: 96.9 F | RESPIRATION RATE: 16 BRPM | OXYGEN SATURATION: 98 % | WEIGHT: 213.63 LBS | HEIGHT: 72 IN | DIASTOLIC BLOOD PRESSURE: 75 MMHG | HEART RATE: 75 BPM | SYSTOLIC BLOOD PRESSURE: 119 MMHG

## 2020-10-27 DIAGNOSIS — I35.0 AORTIC VALVE STENOSIS, ETIOLOGY OF CARDIAC VALVE DISEASE UNSPECIFIED: Primary | ICD-10-CM

## 2020-10-27 LAB
ALBUMIN SERPL-MCNC: 3.9 G/DL (ref 3.5–5)
ALBUMIN/GLOB SERPL: 1.3 {RATIO} (ref 1.1–2.2)
ALP SERPL-CCNC: 70 U/L (ref 45–117)
ALT SERPL-CCNC: 41 U/L (ref 12–78)
ANION GAP SERPL CALC-SCNC: 5 MMOL/L (ref 5–15)
APPEARANCE UR: CLEAR
APTT PPP: 28.6 SEC (ref 22.1–32)
ARTERIAL PATENCY WRIST A: ABNORMAL
AST SERPL-CCNC: 21 U/L (ref 15–37)
ATRIAL RATE: 70 BPM
BACTERIA URNS QL MICRO: NEGATIVE /HPF
BASE EXCESS BLD CALC-SCNC: 3 MMOL/L
BASOPHILS # BLD: 0.1 K/UL (ref 0–0.1)
BASOPHILS NFR BLD: 1 % (ref 0–1)
BDY SITE: ABNORMAL
BILIRUB SERPL-MCNC: 0.4 MG/DL (ref 0.2–1)
BILIRUB UR QL: NEGATIVE
BNP SERPL-MCNC: 38 PG/ML
BUN SERPL-MCNC: 23 MG/DL (ref 6–20)
BUN/CREAT SERPL: 26 (ref 12–20)
CA-I BLD-SCNC: 1.3 MMOL/L (ref 1.12–1.32)
CALCIUM SERPL-MCNC: 9.5 MG/DL (ref 8.5–10.1)
CALCULATED P AXIS, ECG09: 79 DEGREES
CALCULATED R AXIS, ECG10: 56 DEGREES
CALCULATED T AXIS, ECG11: 46 DEGREES
CHLORIDE SERPL-SCNC: 103 MMOL/L (ref 97–108)
CO2 SERPL-SCNC: 30 MMOL/L (ref 21–32)
COLOR UR: NORMAL
CREAT SERPL-MCNC: 0.9 MG/DL (ref 0.7–1.3)
DIAGNOSIS, 93000: NORMAL
DIFFERENTIAL METHOD BLD: ABNORMAL
EOSINOPHIL # BLD: 0.6 K/UL (ref 0–0.4)
EOSINOPHIL NFR BLD: 7 % (ref 0–7)
EPITH CASTS URNS QL MICRO: NORMAL /LPF
ERYTHROCYTE [DISTWIDTH] IN BLOOD BY AUTOMATED COUNT: 13.3 % (ref 11.5–14.5)
EST. AVERAGE GLUCOSE BLD GHB EST-MCNC: 117 MG/DL
GAS FLOW.O2 O2 DELIVERY SYS: ABNORMAL L/MIN
GLOBULIN SER CALC-MCNC: 3 G/DL (ref 2–4)
GLUCOSE SERPL-MCNC: 88 MG/DL (ref 65–100)
GLUCOSE UR STRIP.AUTO-MCNC: NEGATIVE MG/DL
HBA1C MFR BLD: 5.7 % (ref 4–5.6)
HCO3 BLD-SCNC: 28.1 MMOL/L (ref 22–26)
HCT VFR BLD AUTO: 42.9 % (ref 36.6–50.3)
HGB BLD-MCNC: 14 G/DL (ref 12.1–17)
HGB UR QL STRIP: NEGATIVE
HISTORY CHECKED?,CKHIST: NORMAL
HYALINE CASTS URNS QL MICRO: NORMAL /LPF (ref 0–5)
IMM GRANULOCYTES # BLD AUTO: 0.1 K/UL (ref 0–0.04)
IMM GRANULOCYTES NFR BLD AUTO: 1 % (ref 0–0.5)
INR PPP: 0.9 (ref 0.9–1.1)
KETONES UR QL STRIP.AUTO: NEGATIVE MG/DL
LEUKOCYTE ESTERASE UR QL STRIP.AUTO: NEGATIVE
LYMPHOCYTES # BLD: 1.7 K/UL (ref 0.8–3.5)
LYMPHOCYTES NFR BLD: 19 % (ref 12–49)
MCH RBC QN AUTO: 28.5 PG (ref 26–34)
MCHC RBC AUTO-ENTMCNC: 32.6 G/DL (ref 30–36.5)
MCV RBC AUTO: 87.2 FL (ref 80–99)
MONOCYTES # BLD: 1.1 K/UL (ref 0–1)
MONOCYTES NFR BLD: 12 % (ref 5–13)
NEUTS SEG # BLD: 5.6 K/UL (ref 1.8–8)
NEUTS SEG NFR BLD: 60 % (ref 32–75)
NITRITE UR QL STRIP.AUTO: NEGATIVE
NRBC # BLD: 0 K/UL (ref 0–0.01)
NRBC BLD-RTO: 0 PER 100 WBC
P-R INTERVAL, ECG05: 180 MS
PCO2 BLD: 44.2 MMHG (ref 35–45)
PH BLD: 7.41 [PH] (ref 7.35–7.45)
PH UR STRIP: 6.5 [PH] (ref 5–8)
PLATELET # BLD AUTO: 220 K/UL (ref 150–400)
PMV BLD AUTO: 11.4 FL (ref 8.9–12.9)
PO2 BLD: 85 MMHG (ref 80–100)
POTASSIUM SERPL-SCNC: 4 MMOL/L (ref 3.5–5.1)
PROT SERPL-MCNC: 6.9 G/DL (ref 6.4–8.2)
PROT UR STRIP-MCNC: NEGATIVE MG/DL
PROTHROMBIN TIME: 9.9 SEC (ref 9–11.1)
Q-T INTERVAL, ECG07: 370 MS
QRS DURATION, ECG06: 78 MS
QTC CALCULATION (BEZET), ECG08: 399 MS
RBC # BLD AUTO: 4.92 M/UL (ref 4.1–5.7)
RBC #/AREA URNS HPF: NORMAL /HPF (ref 0–5)
SAO2 % BLD: 96 % (ref 92–97)
SODIUM SERPL-SCNC: 138 MMOL/L (ref 136–145)
SP GR UR REFRACTOMETRY: <1.005 (ref 1–1.03)
SPECIMEN TYPE: ABNORMAL
THERAPEUTIC RANGE,PTTT: NORMAL SECS (ref 58–77)
TOTAL RESP. RATE, ITRR: 16
TSH SERPL DL<=0.05 MIU/L-ACNC: 1.92 UIU/ML (ref 0.36–3.74)
UA: UC IF INDICATED,UAUC: NORMAL
UROBILINOGEN UR QL STRIP.AUTO: 0.2 EU/DL (ref 0.2–1)
VENTRICULAR RATE, ECG03: 70 BPM
WBC # BLD AUTO: 9.1 K/UL (ref 4.1–11.1)
WBC URNS QL MICRO: NORMAL /HPF (ref 0–4)

## 2020-10-27 PROCEDURE — 85025 COMPLETE CBC W/AUTO DIFF WBC: CPT

## 2020-10-27 PROCEDURE — 85610 PROTHROMBIN TIME: CPT

## 2020-10-27 PROCEDURE — 36600 WITHDRAWAL OF ARTERIAL BLOOD: CPT

## 2020-10-27 PROCEDURE — 85730 THROMBOPLASTIN TIME PARTIAL: CPT

## 2020-10-27 PROCEDURE — 86900 BLOOD TYPING SEROLOGIC ABO: CPT

## 2020-10-27 PROCEDURE — 82803 BLOOD GASES ANY COMBINATION: CPT

## 2020-10-27 PROCEDURE — 84443 ASSAY THYROID STIM HORMONE: CPT

## 2020-10-27 PROCEDURE — 81001 URINALYSIS AUTO W/SCOPE: CPT

## 2020-10-27 PROCEDURE — 86923 COMPATIBILITY TEST ELECTRIC: CPT

## 2020-10-27 PROCEDURE — 93005 ELECTROCARDIOGRAM TRACING: CPT

## 2020-10-27 PROCEDURE — 36415 COLL VENOUS BLD VENIPUNCTURE: CPT

## 2020-10-27 PROCEDURE — 83880 ASSAY OF NATRIURETIC PEPTIDE: CPT

## 2020-10-27 PROCEDURE — 83036 HEMOGLOBIN GLYCOSYLATED A1C: CPT

## 2020-10-27 PROCEDURE — 80053 COMPREHEN METABOLIC PANEL: CPT

## 2020-10-27 RX ORDER — MUPIROCIN 20 MG/G
OINTMENT TOPICAL 2 TIMES DAILY
Qty: 22 G | Refills: 0 | Status: SHIPPED | OUTPATIENT
Start: 2020-11-01 | End: 2020-12-01 | Stop reason: SDUPTHER

## 2020-10-27 RX ORDER — AMIODARONE HYDROCHLORIDE 200 MG/1
200 TABLET ORAL 2 TIMES DAILY
Qty: 10 TAB | Refills: 0 | Status: SHIPPED | OUTPATIENT
Start: 2020-10-29 | End: 2020-12-01 | Stop reason: SDUPTHER

## 2020-10-27 RX ORDER — METHYLPHENIDATE HYDROCHLORIDE 20 MG/1
20 TABLET ORAL 4 TIMES DAILY
COMMUNITY

## 2020-10-27 RX ORDER — CHLORHEXIDINE GLUCONATE 1.2 MG/ML
15 RINSE ORAL EVERY 12 HOURS
Qty: 420 ML | Refills: 0 | Status: SHIPPED | OUTPATIENT
Start: 2020-11-01 | End: 2020-11-15

## 2020-10-27 NOTE — PERIOP NOTES
Hibiclens/Chlorhexidine    Preventing Infections Before and After  Your Surgery    IMPORTANT INSTRUCTIONS    Please read and follow these instructions carefully. If you are unable to comply with the below instructions your procedure will be cancelled. Every Night for Three (3) nights before your surgery:  1. Shower with an antibacterial soap, such as Dial, or the soap provided at your preassessment appointment. A shower is better than a bath for cleaning your skin. 2. If needed, ask someone to help you reach all areas of your body. Dont forget to clean your belly button with every shower. The night before your surgery: If you lose your Hibiclens/chlorhexidine please contact surgery center or you can purchase it at a local pharmacy  1. On the night before your surgery, shower with an antibacterial soap, such as Dial, or the soap provided at your preassessment appointment. 2. With one packet of Hibiclens/Chlorhexidine in hand, turn water off.  3. Apply Hibiclens antiseptic skin cleanser with a clean, freshly washed washcloth. ? Gently apply to your body from chin to toes (except the genital area) and especially the area(s) where your incision(s) will be. ? Leave Hibiclens/Chlorhexidine on your skin for at least 20 seconds. CAUTION: If needed, Hibiclens/chlorhexidine may be used to clean the folds of skin of the legs (such as in the area of the groin) and on your buttocks and hips. However, do not use Hibiclens/Chlorhexidine above the neck or in the genital area (your bottom) or put inside any area of your body. 4. Turn the water back on and rinse. 5. Dry gently with a clean, freshly washed towel. 6. After your shower, do not use any powder, deodorant, perfumes or lotion. 7. Use clean, freshly washed towels and washcloths every time you shower. 8. Wear clean, freshly washed pajamas to bed the night before surgery. 9. Sleep on clean, freshly washed sheets.   10. Do not allow pets to sleep in your bed with you. The Morning of your surgery:  1. Shower again thoroughly with an antibacterial soap, such as Dial or the soap provided at your preassessment appointment. If needed, ask someone for help to reach all areas of your body. Dont forget to clean your belly button! Rinse. 2. Dry gently with a clean, freshly washed towel. 3. After your shower, do not use any powder, deodorant, perfumes or lotion prior to surgery. 4. Put on clean, freshly washed clothing. Tips to help prevent infections after your surgery:  1. Protect your surgical wound from germs:  ? Hand washing is the most important thing you and your caregivers can do to prevent infections. ? Keep your bandage clean and dry! ? Do not touch your surgical wound. 2. Use clean, freshly washed towels and washcloths every time you shower; do not share bath linens with others. 3. Until your surgical wound is healed, wear clothing and sleep on bed linens each day that are clean and freshly washed. 4. Do not allow pets to sleep in your bed with you or touch your surgical wound. 5. Do not smoke  smoking delays wound healing. This may be a good time to stop smoking. 6. If you have diabetes, it is important for you to manage your blood sugar levels properly before your surgery as well as after your surgery. Poorly managed blood sugar levels slow down wound healing and prevent you from healing completely. If you lose your Hibiclens/chlorhexidine, please call the [facility], or it is available for purchase at your pharmacy.                ___________________      ___________________      ________________  (Signature of Patient)          (Witness)                   (Date and Time)

## 2020-10-27 NOTE — PERIOP NOTES
Incentive Spirometer        Using the incentive spirometer helps expand the small air sacs of your lungs, helps you breathe deeply, and helps improve your lung function. Use your incentive spirometer twice a day (10 breaths each time) prior to surgery. How to Use Your Incentive Spirometer:  1. Hold the incentive spirometer in an upright position. 2. Breathe out as usual.   3. Place the mouthpiece in your mouth and seal your lips tightly around it. 4. Take a deep breath. Breathe in slowly and as deeply as possible. Keep the blue flow rate guide between the arrows. 5. Hold your breath as long as possible. Then exhale slowly and allow the piston to fall to the bottom of the column. 6. Rest for a few seconds and repeat steps one through five at least 10 times. PAT Tidal Volume__2500________________  x__2______________  Date__10/27/20_____________________    Macarena Emani THE INCENTIVE SPIROMETER WITH YOU TO THE HOSPITAL ON THE DAY OF YOUR SURGERY. Opportunity given to ask and answer questions as well as to observe return demonstration.     Patient signature_____________________________    Witness____________________________

## 2020-10-27 NOTE — PERIOP NOTES
[facility]  Preoperative Instructions        Surgery Date 11/03/20          Time of Arrival 530 am Contact # [phone]  Covid Test Scheduled for Friday 10/30/20 at 7:30 AM     1. On the day of your surgery, please report to the Surgical Services Registration Desk and sign in at your designated time. The Surgery Center is located to the right of the Emergency Room. 2. You must have someone with you to drive you home. You should not drive a car for 24 hours following surgery. Please make arrangements for a friend or family member to stay with you for the first 24 hours after your surgery. 3. Do not have anything to eat or drink (including water, gum, mints, coffee, juice) after midnight ?? .? This may not apply to medications prescribed by your physician. ?(Please note below the special instructions with medications to take the morning of your procedure.)    4. We recommend you do not drink any alcoholic beverages for 24 hours before and after your surgery. 5. Contact your surgeons office for instructions on the following medications: non-steroidal anti-inflammatory drugs (i.e. Advil, Aleve), vitamins, and supplements. (Some surgeons will want you to stop these medications prior to surgery and others may allow you to take them)  **If you are currently taking Plavix, Coumadin, Aspirin and/or other blood-thinning agents, contact your surgeon for instructions. ** Your surgeon will partner with the physician prescribing these medications to determine if it is safe to stop or if you need to continue taking. Please do not stop taking these medications without instructions from your surgeon    6. Wear comfortable clothes. Wear glasses instead of contacts. Do not bring any money or jewelry. Please bring picture ID, insurance card, and any prearranged co-payment or hospital payment. Do not wear make-up, particularly mascara the morning of your surgery.   Do not wear nail polish, particularly if you are having foot /hand surgery. Wear your hair loose or down, no ponytails, buns, dianna pins or clips. All body piercings must be removed. Please shower with antibacterial soap for three consecutive days before and on the morning of surgery, but do not apply any lotions, powders or deodorants after the shower on the day of surgery. Please use a fresh towels after each shower. Please sleep in clean clothes and change bed linens the night before surgery. Please do not shave for 48 hours prior to surgery. Shaving of the face is acceptable. 7. You should understand that if you do not follow these instructions your surgery may be cancelled. If your physical condition changes (I.e. fever, cold or flu) please contact your surgeon as soon as possible. 8. It is important that you be on time. If a situation occurs where you may be late, please call [phone] (OR Holding Area). 9. If you have any questions and or problems, please call [phone] (Pre-admission Testing). 10. Your surgery time may be subject to change. You will receive a phone call the evening prior if your time changes. 11.  If having outpatient surgery, you must have someone to drive you here, stay with you during the duration of your stay, and to drive you home at time of discharge. Special Instructions:  Last dose of aspirin 10/28/20,   Last dose of Lisinopril/HCTZ on 10/31/20  Start Amiodarone on 10/29/20  Start Bactroban nasal ointment and Peridex mouth wash on 11/01/20    TAKE ALL MEDICATIONS DAY OF SURGERY EXCEPT:Do not take any medications the day of surgery except bactroban nasal ointment and peridex mouth wash and bring it with you to the hospital.      I understand a pre-operative phone call will be made to verify my surgery time. In the event that I am not available, I give permission for a message to be left on my answering service and/or with another person?   yes         ___________________ __________   _________    (Signature of Patient)             (Witness)                (Date and Time)

## 2020-10-27 NOTE — PERIOP NOTES
Covid Test Scheduled. Patient instructed to self isolate after Covid Swab Test per Recommendations. Pat had CT CHEST 09/23/20 AND CXR 09/11/20. Patient was seen by pulmonary as a follow up- dr Celina Ghotra. Called for last office notes [phone]. [facility] fax[facility] and per Delon Angel, do not need another CXR now. Received Pulmonary Notes from Dr Ann Negro and reviewed. Faxed office notes to Dr Cecilia Galindo office to review and follow up regarding Acid Fast Smear. Fax confirmed.

## 2020-10-28 LAB
BACTERIA SPEC CULT: NORMAL
BACTERIA SPEC CULT: NORMAL
SERVICE CMNT-IMP: NORMAL

## 2020-10-28 NOTE — PERIOP NOTES
Call made to Dr. Irma Morales office in regards to CT results from [facility]. Susan Chaidez who stated she has given the fax to the PA and made him aware of the findings. Dr. Isaac Luna is still in surgery at this time.

## 2020-10-30 ENCOUNTER — HOSPITAL ENCOUNTER (OUTPATIENT)
Dept: CT IMAGING | Age: 60
Discharge: HOME | End: 2020-10-30
Attending: NURSE PRACTITIONER
Payer: COMMERCIAL

## 2020-10-30 ENCOUNTER — TELEPHONE (OUTPATIENT)
Dept: CARDIOTHORACIC SURGERY | Age: 60
End: 2020-10-30

## 2020-10-30 ENCOUNTER — HOSPITAL ENCOUNTER (OUTPATIENT)
Dept: PREADMISSION TESTING | Age: 60
Discharge: HOME | End: 2020-10-30
Payer: COMMERCIAL

## 2020-10-30 DIAGNOSIS — R93.89 ABNORMAL CXR: Primary | ICD-10-CM

## 2020-10-30 DIAGNOSIS — R93.89 ABNORMAL CXR: ICD-10-CM

## 2020-10-30 PROCEDURE — 71250 CT THORAX DX C-: CPT

## 2020-10-30 PROCEDURE — 87635 SARS-COV-2 COVID-19 AMP PRB: CPT

## 2020-10-30 NOTE — TELEPHONE ENCOUNTER
Spoke with Dr. Jorge Ibrahim from ID about patient's chest CT, pending AFB, history and planned AVR on 11/3/20. She recommend repeat chest CT. Chest CT ordered and scheduled for today. Updated patient on discussion with ID and need for CT. He understands.

## 2020-11-02 ENCOUNTER — TELEPHONE (OUTPATIENT)
Dept: CARDIOTHORACIC SURGERY | Age: 60
End: 2020-11-02

## 2020-11-02 NOTE — TELEPHONE ENCOUNTER
Mr. Marry Fernandez received a call from Dr. Chandni Contreras' office but their first available appointment was not until 11/23. Patient would like a phone call to let him know how to proceed since Dr. Lenny Manley thought he could get in to see Dr. Chandni Contreras next week.

## 2020-11-04 ENCOUNTER — DOCUMENTATION ONLY (OUTPATIENT)
Dept: CARDIOTHORACIC SURGERY | Age: 60
End: 2020-11-04

## 2020-11-07 LAB
ABO + RH BLD: NORMAL
BLD PROD TYP BPU: NORMAL
BLD PROD TYP BPU: NORMAL
BLOOD GROUP ANTIBODIES SERPL: NORMAL
BPU ID: NORMAL
BPU ID: NORMAL
CROSSMATCH RESULT,%XM: NORMAL
CROSSMATCH RESULT,%XM: NORMAL
SPECIMEN EXP DATE BLD: NORMAL
STATUS OF UNIT,%ST: NORMAL
STATUS OF UNIT,%ST: NORMAL
UNIT DIVISION, %UDIV: 0
UNIT DIVISION, %UDIV: 0

## 2020-11-12 ENCOUNTER — HOSPITAL ENCOUNTER (OUTPATIENT)
Dept: PREADMISSION TESTING | Age: 60
Discharge: HOME | End: 2020-11-12
Payer: COMMERCIAL

## 2020-11-12 PROCEDURE — 87635 SARS-COV-2 COVID-19 AMP PRB: CPT

## 2020-11-16 ENCOUNTER — HOSPITAL ENCOUNTER (OUTPATIENT)
Age: 60
Setting detail: OUTPATIENT SURGERY
Discharge: HOME | End: 2020-11-16
Attending: INTERNAL MEDICINE | Admitting: INTERNAL MEDICINE
Payer: COMMERCIAL

## 2020-11-16 VITALS
SYSTOLIC BLOOD PRESSURE: 145 MMHG | BODY MASS INDEX: 28.85 KG/M2 | HEIGHT: 72 IN | OXYGEN SATURATION: 98 % | HEART RATE: 74 BPM | TEMPERATURE: 98 F | RESPIRATION RATE: 15 BRPM | DIASTOLIC BLOOD PRESSURE: 75 MMHG | WEIGHT: 213 LBS

## 2020-11-16 PROCEDURE — 76040000019: Performed by: INTERNAL MEDICINE

## 2020-11-16 PROCEDURE — 77030012699 HC VLV SUC CNTRL OCOA -A: Performed by: INTERNAL MEDICINE

## 2020-11-16 PROCEDURE — 99152 MOD SED SAME PHYS/QHP 5/>YRS: CPT

## 2020-11-16 PROCEDURE — 88112 CYTOPATH CELL ENHANCE TECH: CPT

## 2020-11-16 PROCEDURE — 99153 MOD SED SAME PHYS/QHP EA: CPT

## 2020-11-16 PROCEDURE — 87116 MYCOBACTERIA CULTURE: CPT

## 2020-11-16 PROCEDURE — 2709999900 HC NON-CHARGEABLE SUPPLY: Performed by: INTERNAL MEDICINE

## 2020-11-16 PROCEDURE — 74011250636 HC RX REV CODE- 250/636

## 2020-11-16 PROCEDURE — 87070 CULTURE OTHR SPECIMN AEROBIC: CPT

## 2020-11-16 PROCEDURE — 74011000250 HC RX REV CODE- 250: Performed by: INTERNAL MEDICINE

## 2020-11-16 PROCEDURE — 74011250636 HC RX REV CODE- 250/636: Performed by: INTERNAL MEDICINE

## 2020-11-16 PROCEDURE — 77030022556 HC FCPS BIOP TIS ENDOSC BSC -B: Performed by: INTERNAL MEDICINE

## 2020-11-16 PROCEDURE — 87102 FUNGUS ISOLATION CULTURE: CPT

## 2020-11-16 RX ORDER — SODIUM CHLORIDE 0.9 % (FLUSH) 0.9 %
5-40 SYRINGE (ML) INJECTION EVERY 8 HOURS
Status: DISCONTINUED | OUTPATIENT
Start: 2020-11-16 | End: 2020-11-16 | Stop reason: HOSPADM

## 2020-11-16 RX ORDER — SODIUM CHLORIDE 0.9 % (FLUSH) 0.9 %
5-40 SYRINGE (ML) INJECTION AS NEEDED
Status: DISCONTINUED | OUTPATIENT
Start: 2020-11-16 | End: 2020-11-16 | Stop reason: HOSPADM

## 2020-11-16 RX ORDER — ATROPINE SULFATE 0.1 MG/ML
0.5 INJECTION INTRAVENOUS
Status: DISCONTINUED | OUTPATIENT
Start: 2020-11-16 | End: 2020-11-16 | Stop reason: HOSPADM

## 2020-11-16 RX ORDER — FLUMAZENIL 0.1 MG/ML
0.2 INJECTION INTRAVENOUS
Status: DISCONTINUED | OUTPATIENT
Start: 2020-11-16 | End: 2020-11-16 | Stop reason: HOSPADM

## 2020-11-16 RX ORDER — LIDOCAINE HYDROCHLORIDE 20 MG/ML
5 SOLUTION OROPHARYNGEAL ONCE
Status: COMPLETED | OUTPATIENT
Start: 2020-11-16 | End: 2020-11-16

## 2020-11-16 RX ORDER — DIPHENHYDRAMINE HYDROCHLORIDE 50 MG/ML
INJECTION, SOLUTION INTRAMUSCULAR; INTRAVENOUS
Status: DISCONTINUED
Start: 2020-11-16 | End: 2020-11-16 | Stop reason: HOSPADM

## 2020-11-16 RX ORDER — SODIUM CHLORIDE 9 MG/ML
25 INJECTION, SOLUTION INTRAVENOUS CONTINUOUS
Status: DISCONTINUED | OUTPATIENT
Start: 2020-11-16 | End: 2020-11-16 | Stop reason: HOSPADM

## 2020-11-16 RX ORDER — DEXTROMETHORPHAN/PSEUDOEPHED 2.5-7.5/.8
1.2 DROPS ORAL
Status: DISCONTINUED | OUTPATIENT
Start: 2020-11-16 | End: 2020-11-16 | Stop reason: HOSPADM

## 2020-11-16 RX ORDER — MIDAZOLAM HYDROCHLORIDE 1 MG/ML
.25-5 INJECTION, SOLUTION INTRAMUSCULAR; INTRAVENOUS
Status: DISCONTINUED | OUTPATIENT
Start: 2020-11-16 | End: 2020-11-16 | Stop reason: HOSPADM

## 2020-11-16 RX ORDER — LIDOCAINE HYDROCHLORIDE 10 MG/ML
300 INJECTION INFILTRATION; PERINEURAL ONCE
Status: COMPLETED | OUTPATIENT
Start: 2020-11-16 | End: 2020-11-16

## 2020-11-16 RX ORDER — FENTANYL CITRATE 50 UG/ML
50 INJECTION, SOLUTION INTRAMUSCULAR; INTRAVENOUS
Status: DISCONTINUED | OUTPATIENT
Start: 2020-11-16 | End: 2020-11-16 | Stop reason: HOSPADM

## 2020-11-16 RX ORDER — DIPHENHYDRAMINE HYDROCHLORIDE 50 MG/ML
25 INJECTION, SOLUTION INTRAMUSCULAR; INTRAVENOUS ONCE
Status: COMPLETED | OUTPATIENT
Start: 2020-11-16 | End: 2020-11-16

## 2020-11-16 RX ORDER — EPINEPHRINE 0.1 MG/ML
1 INJECTION INTRACARDIAC; INTRAVENOUS
Status: DISCONTINUED | OUTPATIENT
Start: 2020-11-16 | End: 2020-11-16 | Stop reason: HOSPADM

## 2020-11-16 RX ORDER — MIDAZOLAM HYDROCHLORIDE 1 MG/ML
INJECTION, SOLUTION INTRAMUSCULAR; INTRAVENOUS
Status: COMPLETED
Start: 2020-11-16 | End: 2020-11-16

## 2020-11-16 RX ORDER — NALOXONE HYDROCHLORIDE 0.4 MG/ML
0.4 INJECTION, SOLUTION INTRAMUSCULAR; INTRAVENOUS; SUBCUTANEOUS
Status: DISCONTINUED | OUTPATIENT
Start: 2020-11-16 | End: 2020-11-16 | Stop reason: HOSPADM

## 2020-11-16 RX ADMIN — MIDAZOLAM HYDROCHLORIDE 0.5 MG: 1 INJECTION, SOLUTION INTRAMUSCULAR; INTRAVENOUS at 08:16

## 2020-11-16 RX ADMIN — DIPHENHYDRAMINE HYDROCHLORIDE 25 MG: 50 INJECTION, SOLUTION INTRAMUSCULAR; INTRAVENOUS at 08:14

## 2020-11-16 RX ADMIN — SODIUM CHLORIDE 25 ML/HR: 900 INJECTION, SOLUTION INTRAVENOUS at 07:40

## 2020-11-16 RX ADMIN — FENTANYL CITRATE 25 MCG: 50 INJECTION, SOLUTION INTRAMUSCULAR; INTRAVENOUS at 08:16

## 2020-11-16 RX ADMIN — LIDOCAINE HYDROCHLORIDE 30 ML: 10 INJECTION, SOLUTION INFILTRATION; PERINEURAL at 08:33

## 2020-11-16 RX ADMIN — LIDOCAINE HYDROCHLORIDE 5 ML: 20 SOLUTION ORAL; TOPICAL at 08:19

## 2020-11-16 NOTE — H&P
[facility]  Pulmonary, Critical Care, and Sleep Medicine    Initial Patient Consult    Name: Alexis Trejo MRN: [id number]   : 1960 Hospital: [facility]   Date: 2020        IMPRESSION:   · PT with abnormal Chest CT scan imaging. · Severe Aortic Stenosis. · Covid test was negative from 20. RECOMMENDATIONS:   · Will proceed with bronchoscopy for further evaluation. · Will get AFB, Fungal and Routine Cx. Subjective: This patient has been seen and evaluated at the request of Dr. Billy Oliveros  for above. Patient is a 63 y.o. male who presented to bronchoscopy for further evaluation of abnormal CT scan. Past Medical History:   Diagnosis Date    Adverse effect of anesthesia     took long time to wake up    Asthma     Hypercholesterolemia     Hypertension     Ill-defined condition     \"Idiopathic hypersomnia\"    Ill-defined condition     restless leg syndrome    Ill-defined condition     aortic valve due to to be replaced    Lumbar herniated disc     Sleep apnea     use cpap      Past Surgical History:   Procedure Laterality Date    HX BACK SURGERY      lumbar 4-5    HX HEART CATHETERIZATION      HX TONSILLECTOMY        Prior to Admission medications    Medication Sig Start Date End Date Taking? Authorizing Provider   methylphenidate HCl (Ritalin) 20 mg tablet Take 20 mg by mouth four (4) times daily. 6 am, 10 am, 2 pm, and 6 pm   Yes Provider, Historical   cholecalciferol, vitamin D3, (VITAMIN D3 PO) Take 5,000 Units by mouth daily. Yes Provider, Historical   aspirin (ASPIRIN) 325 mg tablet Take 325 mg by mouth nightly. Yes Provider, Historical   solriamfetoL (Sunosi) 150 mg tab Take 1 Tab by mouth daily. Yes Provider, Historical   Cetirizine 10 mg cap Take 1 Tab by mouth daily. Yes Provider, Historical   multivitamin (ONE A DAY) tablet Take 1 Tab by mouth daily.    Yes Provider, Historical   rosuvastatin (CRESTOR) 20 mg tablet Take 20 mg by mouth nightly. Yes Provider, Historical   lisinopril-hydrochlorothiazide (PRINZIDE, ZESTORETIC) 10-12.5 mg per tablet Take 1 Tab by mouth daily. 12/17/10  Yes Dimitris Michael MD   pramipexole (MIRAPEX) 0.5 mg tablet Take 0.5 mg by mouth every evening. 8 pm 5/7/10  Yes Provider, Historical   montelukast (SINGULAIR) 10 mg tablet Take 10 mg by mouth daily. Yes Provider, Historical   pramipexole (MIRAPEX) 0.125 mg tablet Take 0.125 mg by mouth daily (after lunch). 2 pm 5/7/10  Yes Provider, Historical   cpap machine kit by Does Not Apply route. Yes Provider, Historical   amiodarone (CORDARONE) 200 mg tablet Take 1 Tab by mouth two (2) times a day. 10/29/20   Eloy Duran NP   mupirocin (BACTROBAN) 2 % ointment by Both Nostrils route two (2) times a day. 11/1/20   Eloy Duran NP   chlorhexidine (PERIDEX) 0.12 % solution 15 mL by Swish and Spit route every twelve (12) hours for 14 days. 11/1/20 11/15/20  Eloy Duran NP     Allergies   Allergen Reactions    Keflex [Cephalexin] Hives    Pcn [Penicillins] Hives      Social History     Tobacco Use    Smoking status: Never Smoker    Smokeless tobacco: Never Used   Substance Use Topics    Alcohol use: Not Currently      Family History   Problem Relation Age of Onset    Heart Disease Mother     Heart Disease Father     Heart Disease Paternal Grandfather         No current facility-administered medications for this encounter. Review of Systems:  A comprehensive review of systems was negative. Objective:   Vital Signs: There were no vitals taken for this visit. No data recorded. Intake/Output:   Last shift:      No intake/output data recorded. Last 3 shifts: No intake/output data recorded. No intake or output data in the 24 hours ending 11/16/20 6945   Physical Exam:   General:  Alert, cooperative, no distress, appears stated age.    Head:  Normocephalic, without obvious abnormality, atraumatic. Eyes:  Conjunctivae/corneas clear. PERRL, EOMs intact. Nose: Nares normal. Septum midline. Mucosa normal. No drainage or sinus tenderness. Throat: Lips, mucosa, and tongue normal. Teeth and gums normal.   Neck: Supple, symmetrical, trachea midline, no adenopathy, thyroid: no enlargment/tenderness/nodules, no carotid bruit and no JVD. Back:   Symmetric, no curvature. ROM normal.   Lungs:   Clear to auscultation bilaterally. Chest wall:  No tenderness or deformity. Heart:  Regular rate and rhythm, S1, S2 normal, NOHEMY murmur, NO click, rub or gallop. Abdomen:   Soft, non-tender. Bowel sounds normal. No masses,  No organomegaly. Extremities: Extremities normal, atraumatic, no cyanosis or edema. Pulses: 2+ and symmetric all extremities. Skin: Skin color, texture, turgor normal. No rashes or lesions   Lymph nodes: Cervical, supraclavicular, and axillary nodes normal.   Neurologic: Grossly nonfocal     Data review:   No results found for this or any previous visit (from the past 24 hour(s)). Imaging:  I have personally reviewed the patients radiographs and have reviewed the reports:  10-30-20: CT of chest: CHEST:  Chest wall/thoracic inlet: Bilateral gynecomastia. Thyroid: Within normal limits. Mediastinum/nicole: Within normal limits. Heart/vessels: Extensive calcification about the aortic valve. Calcifications in  the coronary arteries. Lungs/Pleura: Pleural plaques, most pronounced in the lung apices. There is an  area of scarring/atelectasis and/or nodularity and bronchiectasis in the  central, posterior right upper lobe measuring approximately 4.8 x 3.1 cm, not  significantly changed. There is a lesser degree of scarring and bronchiectasis  centrally in the left upper lobe. There is minimal tree-in-bud micronodularity  in the central aspect of both lower lobes. No significant change in the  appearance of multiple nodules  .   INCIDENTALLY IMAGED ABDOMEN:  Small hiatal hernia   . MSK:   Kyphosis and dextroscoliosis. .    IMPRESSION:    1. No significant change in the appearance of the lungs with pleural plaques and  a complex area of scarring/atelectasis and/or nodularity with bronchiectasis in  the right upper lobe. Again, PET/CT should be considered. There is no area that  appears to be significantly changed. 2. Extensive calcification about the aortic valve. Recommend clinical  correlation for valvular dysfunction. 3. Tree-in-bud micronodularity suggesting atypical infectious process such as  mycobacterium. 4. Incidental findings as above.         Shy Alvarado MD

## 2020-11-16 NOTE — DISCHARGE INSTRUCTIONS
[facility] [place]  Pulmonary, Critical Care, and Sleep Medicine    Name: Jamie Melo MRN: [id number]   : 1960 Hospital: [facility]   Date: 2020        BRONCHOSCOPY DISCHARGE INSTRUCTIONS  Discomfort:  Sore throat- throat lozenges or warm salt water gargle  redness at IV site- apply warm compress to area; if redness or soreness persist- contact your physician  Gaseous discomfort- walking, belching will help relieve any discomfort  Should not operate a vehicle for at least 12 hours  You should not engage in an occupation involving machinery or appliances for rest of today  You should not drink alcoholic beverages for at least 12 hours  Avoid making any critical decisions for at least 24 hour  Blood tinged secretions - this should stop within 2-3 hours  DIET  Nothing by mouth- do not eat or drink for two hours. You may eat and drink after 11am.  ACTIVITY  You may resume your normal daily activities however it is recommended that you spend the remainder of the day resting -  avoid any strenuous activity. CALL M.D. FOR ANY SIGN OF   Increasing pain, nausea, vomiting  New increased bleeding  Fever (chills)  Pain in chest area  Bloody discharge from nose or mouth  Shortness of breath  Bubbles under the skin around the collarbone. These may crackle and pop when you press on them. Coughing up more than ½ cup of blood. Call [phone] office for the following  Results of procedure / biopsy in 5 days    Telephone #  [phone]  Additional instructions: If you have not heard the results of your test by  please call the phone number listed above.

## 2020-11-16 NOTE — PROCEDURES
[facility]  Pulmonary, Critical Care, and Sleep Medicine    Name: Yasmeen Trujillo MRN: [id number]   : 1960 Hospital: [facility]   Date: 2020        Bronchoscopy Report    Procedure: Diagnostic bronchoscopy. Indication: Abnormal chest imaging    Consent/Treatment: Informed consent was obtained from the  patient after risks, benefits and alternatives were explained. Timeout verified the correct patient and correct procedure. Anesthesia:   Moderate sedation with Fentanyl 25 mcg, Versed 0.5mg and Benadryl 25 mg was used  Moderate (conscious) sedation was administered by the endoscopy nurse and supervised by the endoscopist.  The following parameters were monitored: oxygen saturation, heart rate, blood pressure, respiratory rate, EKG, adequacy of pulmonary ventilation, and response to care. Total physician intraservice time was 30 minutes. Procedure Details:   -- The bronchoscope was introduced through the  right nare. -- The vocal cords were found to be normal.  -- The trachea and michael were completely inspected and were found to be normal.  -- The right-sided endobronchial anatomy was completely inspected and was found to be normal.  -- The left-sided endobronchial anatomy was completely inspected and was found to be normal. PT had normal mucosa. RUL bronchial lavage x4 of different segments of the RUL.      Specimens:   Bronchial washings were sent for  microbiology, cytology, AFB smear and culture and fungal culture    Complications: none    Estimated Blood Loss: Minimal        Alyse Whitmore MD

## 2020-11-18 LAB
BACTERIA SPEC CULT: NORMAL
GRAM STN SPEC: NORMAL
GRAM STN SPEC: NORMAL
SERVICE CMNT-IMP: NORMAL

## 2020-11-30 LAB
BACTERIA SPEC CULT: ABNORMAL
SERVICE CMNT-IMP: ABNORMAL

## 2020-12-01 ENCOUNTER — HOSPITAL ENCOUNTER (OUTPATIENT)
Dept: PREADMISSION TESTING | Age: 60
Discharge: HOME | End: 2020-12-01
Payer: COMMERCIAL

## 2020-12-01 VITALS
TEMPERATURE: 98.5 F | HEIGHT: 72 IN | WEIGHT: 220.68 LBS | SYSTOLIC BLOOD PRESSURE: 157 MMHG | RESPIRATION RATE: 15 BRPM | DIASTOLIC BLOOD PRESSURE: 67 MMHG | OXYGEN SATURATION: 98 % | HEART RATE: 84 BPM | BODY MASS INDEX: 29.89 KG/M2

## 2020-12-01 DIAGNOSIS — I35.0 AORTIC VALVE STENOSIS, ETIOLOGY OF CARDIAC VALVE DISEASE UNSPECIFIED: ICD-10-CM

## 2020-12-01 LAB
ALBUMIN SERPL-MCNC: 4.1 G/DL (ref 3.5–5)
ALBUMIN/GLOB SERPL: 1.2 {RATIO} (ref 1.1–2.2)
ALP SERPL-CCNC: 81 U/L (ref 45–117)
ALT SERPL-CCNC: 50 U/L (ref 12–78)
ANION GAP SERPL CALC-SCNC: 3 MMOL/L (ref 5–15)
APPEARANCE UR: CLEAR
APTT PPP: 27.1 SEC (ref 22.1–31)
AST SERPL-CCNC: 23 U/L (ref 15–37)
BACTERIA URNS QL MICRO: NEGATIVE /HPF
BASOPHILS # BLD: 0.1 K/UL (ref 0–0.1)
BASOPHILS NFR BLD: 1 % (ref 0–1)
BILIRUB SERPL-MCNC: 0.4 MG/DL (ref 0.2–1)
BILIRUB UR QL: NEGATIVE
BNP SERPL-MCNC: 28 PG/ML
BUN SERPL-MCNC: 18 MG/DL (ref 6–20)
BUN/CREAT SERPL: 18 (ref 12–20)
CALCIUM SERPL-MCNC: 9.4 MG/DL (ref 8.5–10.1)
CHLORIDE SERPL-SCNC: 103 MMOL/L (ref 97–108)
CO2 SERPL-SCNC: 30 MMOL/L (ref 21–32)
COLOR UR: NORMAL
CREAT SERPL-MCNC: 0.98 MG/DL (ref 0.7–1.3)
DIFFERENTIAL METHOD BLD: ABNORMAL
EOSINOPHIL # BLD: 0.5 K/UL (ref 0–0.4)
EOSINOPHIL NFR BLD: 6 % (ref 0–7)
EPITH CASTS URNS QL MICRO: NORMAL /LPF
ERYTHROCYTE [DISTWIDTH] IN BLOOD BY AUTOMATED COUNT: 13.2 % (ref 11.5–14.5)
GLOBULIN SER CALC-MCNC: 3.3 G/DL (ref 2–4)
GLUCOSE SERPL-MCNC: 83 MG/DL (ref 65–100)
GLUCOSE UR STRIP.AUTO-MCNC: NEGATIVE MG/DL
HCT VFR BLD AUTO: 43 % (ref 36.6–50.3)
HGB BLD-MCNC: 14.3 G/DL (ref 12.1–17)
HGB UR QL STRIP: NEGATIVE
HYALINE CASTS URNS QL MICRO: NORMAL /LPF (ref 0–5)
IMM GRANULOCYTES # BLD AUTO: 0 K/UL (ref 0–0.04)
IMM GRANULOCYTES NFR BLD AUTO: 0 % (ref 0–0.5)
INR PPP: 0.9 (ref 0.9–1.1)
KETONES UR QL STRIP.AUTO: NEGATIVE MG/DL
LEUKOCYTE ESTERASE UR QL STRIP.AUTO: NEGATIVE
LYMPHOCYTES # BLD: 2.1 K/UL (ref 0.8–3.5)
LYMPHOCYTES NFR BLD: 25 % (ref 12–49)
MAGNESIUM SERPL-MCNC: 2.2 MG/DL (ref 1.6–2.4)
MCH RBC QN AUTO: 28.7 PG (ref 26–34)
MCHC RBC AUTO-ENTMCNC: 33.3 G/DL (ref 30–36.5)
MCV RBC AUTO: 86.3 FL (ref 80–99)
MONOCYTES # BLD: 0.9 K/UL (ref 0–1)
MONOCYTES NFR BLD: 10 % (ref 5–13)
NEUTS SEG # BLD: 4.7 K/UL (ref 1.8–8)
NEUTS SEG NFR BLD: 58 % (ref 32–75)
NITRITE UR QL STRIP.AUTO: NEGATIVE
NRBC # BLD: 0 K/UL (ref 0–0.01)
NRBC BLD-RTO: 0 PER 100 WBC
PH UR STRIP: 7.5 [PH] (ref 5–8)
PLATELET # BLD AUTO: 244 K/UL (ref 150–400)
PMV BLD AUTO: 10.8 FL (ref 8.9–12.9)
POTASSIUM SERPL-SCNC: 3.9 MMOL/L (ref 3.5–5.1)
PROT SERPL-MCNC: 7.4 G/DL (ref 6.4–8.2)
PROT UR STRIP-MCNC: NEGATIVE MG/DL
PROTHROMBIN TIME: 9.9 SEC (ref 9–11.1)
RBC # BLD AUTO: 4.98 M/UL (ref 4.1–5.7)
RBC #/AREA URNS HPF: NORMAL /HPF (ref 0–5)
SODIUM SERPL-SCNC: 136 MMOL/L (ref 136–145)
SP GR UR REFRACTOMETRY: <1.005 (ref 1–1.03)
THERAPEUTIC RANGE,PTTT: NORMAL SECS (ref 58–77)
UA: UC IF INDICATED,UAUC: NORMAL
UROBILINOGEN UR QL STRIP.AUTO: 0.2 EU/DL (ref 0.2–1)
WBC # BLD AUTO: 8.2 K/UL (ref 4.1–11.1)
WBC URNS QL MICRO: NORMAL /HPF (ref 0–4)

## 2020-12-01 PROCEDURE — 83880 ASSAY OF NATRIURETIC PEPTIDE: CPT

## 2020-12-01 PROCEDURE — 83735 ASSAY OF MAGNESIUM: CPT

## 2020-12-01 PROCEDURE — 81001 URINALYSIS AUTO W/SCOPE: CPT

## 2020-12-01 PROCEDURE — 86923 COMPATIBILITY TEST ELECTRIC: CPT

## 2020-12-01 PROCEDURE — 86900 BLOOD TYPING SEROLOGIC ABO: CPT

## 2020-12-01 PROCEDURE — 85025 COMPLETE CBC W/AUTO DIFF WBC: CPT

## 2020-12-01 PROCEDURE — 85730 THROMBOPLASTIN TIME PARTIAL: CPT

## 2020-12-01 PROCEDURE — 36415 COLL VENOUS BLD VENIPUNCTURE: CPT

## 2020-12-01 PROCEDURE — 85610 PROTHROMBIN TIME: CPT

## 2020-12-01 PROCEDURE — 80053 COMPREHEN METABOLIC PANEL: CPT

## 2020-12-01 RX ORDER — AMIODARONE HYDROCHLORIDE 200 MG/1
200 TABLET ORAL 2 TIMES DAILY
Qty: 10 TAB | Refills: 0 | Status: SHIPPED | OUTPATIENT
Start: 2020-12-03 | End: 2020-12-12

## 2020-12-01 RX ORDER — CHLORHEXIDINE GLUCONATE 1.2 MG/ML
15 RINSE ORAL EVERY 12 HOURS
Qty: 420 ML | Refills: 0 | Status: SHIPPED | OUTPATIENT
Start: 2020-12-06 | End: 2020-12-12

## 2020-12-01 RX ORDER — SODIUM CHLORIDE, SODIUM LACTATE, POTASSIUM CHLORIDE, CALCIUM CHLORIDE 600; 310; 30; 20 MG/100ML; MG/100ML; MG/100ML; MG/100ML
25 INJECTION, SOLUTION INTRAVENOUS CONTINUOUS
Status: CANCELLED | OUTPATIENT
Start: 2020-12-08

## 2020-12-01 RX ORDER — MUPIROCIN 20 MG/G
OINTMENT TOPICAL 2 TIMES DAILY
Qty: 22 G | Refills: 0 | Status: SHIPPED | OUTPATIENT
Start: 2020-12-06 | End: 2020-12-12

## 2020-12-01 NOTE — PERIOP NOTES
[facility]  Preoperative Instructions     COVID Test 12/4/2020 @ 0730 - [facility] ([address])    Surgery Date 12/8/2020        Time of Arrival:  10:00 am    Contact # [phone]    1. On the day of your surgery, please report to the Surgical Services Registration Desk and sign in at your designated time. The Surgery Center is located to the right of the Emergency Room. 2. You must have someone with you to drive you home. You should not drive a car for 24 hours following surgery. Please make arrangements for a friend or family member to stay with you for the first 24 hours after your surgery. 3. Do not have anything to eat or drink (including water, gum, mints, coffee, juice) after midnight ?12/7/2020? Bayron Calixto ? This may not apply to medications prescribed by your physician. ?(Please note below the special instructions with medications to take the morning of your procedure.)    4. We recommend you do not drink any alcoholic beverages for 24 hours before and after your surgery. 5. Contact your surgeons office for instructions on the following medications: non-steroidal anti-inflammatory drugs (i.e. Advil, Aleve), vitamins, and supplements. (Some surgeons will want you to stop these medications prior to surgery and others may allow you to take them)  **If you are currently taking Plavix, Coumadin, Aspirin and/or other blood-thinning agents, contact your surgeon for instructions. ** Your surgeon will partner with the physician prescribing these medications to determine if it is safe to stop or if you need to continue taking. Please do not stop taking these medications without instructions from your surgeon    6. Wear comfortable clothes. Wear glasses instead of contacts. Do not bring any money or jewelry. Please bring picture ID, insurance card, and any prearranged co-payment or hospital payment. Do not wear make-up, particularly mascara the morning of your surgery.   Do not wear nail polish, particularly if you are having foot /hand surgery. Wear your hair loose or down, no ponytails, buns, dianna pins or clips. All body piercings must be removed. Please shower with antibacterial soap for three consecutive days before and on the morning of surgery, but do not apply any lotions, powders or deodorants after the shower on the day of surgery. Please use a fresh towels after each shower. Please sleep in clean clothes and change bed linens the night before surgery. Please do not shave for 48 hours prior to surgery. Shaving of the face is acceptable. 7. You should understand that if you do not follow these instructions your surgery may be cancelled. If your physical condition changes (I.e. fever, cold or flu) please contact your surgeon as soon as possible. 8. It is important that you be on time. If a situation occurs where you may be late, please call [phone] (OR Holding Area). 9. If you have any questions and or problems, please call [phone] (Pre-admission Testing). 10. Your surgery time may be subject to change. You will receive a phone call the evening prior if your time changes. 11.  If having outpatient surgery, you must have someone to drive you here, stay with you during the duration of your stay, and to drive you home at time of discharge. Special Instructions: Recommend to self-quarantine after COVID test until surgery. Use Incentive Spirometer 20 times daily prior to surgery. Bring CPAP with you to the hospital.      Last dose of Aspirin on 12/2/2020. Last dose of Lisinopril-HCTZ on 12/5/2020. Start Amiodarone twice a day on 12/3/2020. Start Peridex mouthwash and Mupirocin nasal ointment twice a day on 12/6/2020. Bring with you to the hospital.    NO MEDICATIONS the morning of surgery. I understand a pre-operative phone call will be made to verify my surgery time.   In the event that I am not available, I give permission for a message to be left on my answering service and/or with another person?   yes       ___________________      __________   _________    (Signature of Patient)             (Witness)                (Date and Time)

## 2020-12-01 NOTE — PERIOP NOTES
Hibiclens/Chlorhexidine    Preventing Infections Before and After  Your Surgery    IMPORTANT INSTRUCTIONS    Please read and follow these instructions carefully. If you are unable to comply with the below instructions your procedure will be cancelled. Every Night for Three (3) nights before your surgery:  1. Shower with an antibacterial soap, such as Dial, or the soap provided at your preassessment appointment. A shower is better than a bath for cleaning your skin. 2. If needed, ask someone to help you reach all areas of your body. Dont forget to clean your belly button with every shower. The night before your surgery: If you lose your Hibiclens/chlorhexidine please contact surgery center or you can purchase it at a local pharmacy  1. On the night before your surgery, shower with an antibacterial soap, such as Dial, or the soap provided at your preassessment appointment. 2. With one packet of Hibiclens/Chlorhexidine in hand, turn water off.  3. Apply Hibiclens antiseptic skin cleanser with a clean, freshly washed washcloth. ? Gently apply to your body from chin to toes (except the genital area) and especially the area(s) where your incision(s) will be. ? Leave Hibiclens/Chlorhexidine on your skin for at least 20 seconds. CAUTION: If needed, Hibiclens/chlorhexidine may be used to clean the folds of skin of the legs (such as in the area of the groin) and on your buttocks and hips. However, do not use Hibiclens/Chlorhexidine above the neck or in the genital area (your bottom) or put inside any area of your body. 4. Turn the water back on and rinse. 5. Dry gently with a clean, freshly washed towel. 6. After your shower, do not use any powder, deodorant, perfumes or lotion. 7. Use clean, freshly washed towels and washcloths every time you shower. 8. Wear clean, freshly washed pajamas to bed the night before surgery. 9. Sleep on clean, freshly washed sheets.   10. Do not allow pets to sleep in your bed with you. The Morning of your surgery:  1. Shower again thoroughly with an antibacterial soap, such as Dial or the soap provided at your preassessment appointment. If needed, ask someone for help to reach all areas of your body. Dont forget to clean your belly button! Rinse. 2. Dry gently with a clean, freshly washed towel. 3. After your shower, do not use any powder, deodorant, perfumes or lotion prior to surgery. 4. Put on clean, freshly washed clothing. Tips to help prevent infections after your surgery:  1. Protect your surgical wound from germs:  ? Hand washing is the most important thing you and your caregivers can do to prevent infections. ? Keep your bandage clean and dry! ? Do not touch your surgical wound. 2. Use clean, freshly washed towels and washcloths every time you shower; do not share bath linens with others. 3. Until your surgical wound is healed, wear clothing and sleep on bed linens each day that are clean and freshly washed. 4. Do not allow pets to sleep in your bed with you or touch your surgical wound. 5. Do not smoke  smoking delays wound healing. This may be a good time to stop smoking. 6. If you have diabetes, it is important for you to manage your blood sugar levels properly before your surgery as well as after your surgery. Poorly managed blood sugar levels slow down wound healing and prevent you from healing completely. If you lose your Hibiclens/chlorhexidine, please call the [facility], or it is available for purchase at your pharmacy.                ___________________      ___________________      ________________  (Signature of Patient)          (Witness)                   (Date and Time)

## 2020-12-01 NOTE — PERIOP NOTES
Incentive Spirometer        Using the incentive spirometer helps expand the small air sacs of your lungs, helps you breathe deeply, and helps improve your lung function. Use your incentive spirometer twice a day (10 breaths each time) prior to surgery. How to Use Your Incentive Spirometer:  1. Hold the incentive spirometer in an upright position. 2. Breathe out as usual.   3. Place the mouthpiece in your mouth and seal your lips tightly around it. 4. Take a deep breath. Breathe in slowly and as deeply as possible. Keep the blue flow rate guide between the arrows. 5. Hold your breath as long as possible. Then exhale slowly and allow the piston to fall to the bottom of the column. 6. Rest for a few seconds and repeat steps one through five at least 10 times. PAT Tidal Volume____2500______________  x_____1___________  Date____12/1/2020___________________    Samule Mail THE INCENTIVE SPIROMETER WITH YOU TO THE HOSPITAL ON THE DAY OF YOUR SURGERY. Opportunity given to ask and answer questions as well as to observe return demonstration.     Patient signature_____________________________    Witness____________________________

## 2020-12-01 NOTE — H&P
CSS   History and Physical    Subjective:   **information obtained from previous H&P -updated below     59 y.o. male with PMHx of AS,CAD, Hypertension, Dyslipidemia, Mild bilateral Carotid Artery Stenosis, Hypersomnia on Adderal,Asthma, ZAKIA/RLS on CPAP and previous back surgery that is referred to the [address] [place] [address] by Dr. Matt Shaffer interventional evaluation of his severe aortic stenosis.     Mr. Jef Lala is  and has four adult children. He is a retired .      He has no history of tobacco or alcohol abuse. His family history includes both mother and father with MI and Hypertension     He has been experiencing fatigue and AGUIRRE. He denies chest pain, palpitations, lightheadedness, and syncope. He recently had a treadmill stress test which demonstrated adequate exercise tolerance and increased shortness of breath. He also had noted ST depression.      He has followed with a pulmonologist/Sleep Study physician, Dr. Melba Farrar for over 10 years. His wife and the patient describe significant asthma/ZAKIA. He is compliant with his CPAP. He doesn't recall having PFTs in the recent years. He does have a significant history of asthma exacerbations in childhood.     I attempted to call Dr. Melba Farrar but his office is closed and they are closed on Fridays. The patient will discuss this on Monday with Dr. Rob Benavides office and see if we can connect.     Today he underwent cardiac cath which revealed mild CAD and severe AS. We have been asked to render a surgical opinion. Update: Pt was seen by Dr. Mady Tobias from [facility] for abnormal chest CT. He had a bronch and cultures sent. Cleared by pulmonary for surgery. Cardiac Testing    Cardiac catheterization: Diagnostic   Dominance: Right   Left Main    The vessel is large. The vessel is angiographically normal.    Left Anterior Descending    The vessel is large. The vessel exhibits minimal luminal irregularities. 2 medium diagonals;  Short LM; LAD best imaged with JL3. Ramus Intermedius    The vessel is angiographically normal. Trivial vessel. Left Circumflex    The vessel is large. The vessel exhibits minimal luminal irregularities. Medium OM1; large OM2; trivial terminal OM. Right Coronary Artery    The vessel is moderate in size. The vessel is angiographically normal. Anterior take-off: imaged with ARmod         ECHO: 6-  LVEF 70%, Severe AS, Mild AI, LAURA not documented, AV Mean Gradient 39 mmHg and AV Velocity is 3.9 m/s, Mild LVH, Pap 29    Past Medical History:   Diagnosis Date    Adverse effect of anesthesia     took long time to wake up    Asthma     Hypercholesterolemia     Hypertension     Ill-defined condition     \"Idiopathic hypersomnia\"    Ill-defined condition     restless leg syndrome    Ill-defined condition     aortic valve due to to be replaced    Lumbar herniated disc     Sleep apnea     use cpap     Past Surgical History:   Procedure Laterality Date    HX BACK SURGERY  2016    lumbar 4-5    HX HEART CATHETERIZATION  2020    HX TONSILLECTOMY        Social History     Tobacco Use    Smoking status: Never Smoker    Smokeless tobacco: Never Used   Substance Use Topics    Alcohol use: Not Currently      Family History   Problem Relation Age of Onset    Heart Disease Mother     Heart Disease Father     Heart Disease Paternal Grandfather      Prior to Admission medications    Medication Sig Start Date End Date Taking? Authorizing Provider   amiodarone (CORDARONE) 200 mg tablet Take 1 Tab by mouth two (2) times a day. 10/29/20   Sarahi Gonzalez NP   mupirocin (BACTROBAN) 2 % ointment by Both Nostrils route two (2) times a day. 11/1/20   Sarahi Gonzalez NP   methylphenidate HCl (Ritalin) 20 mg tablet Take 20 mg by mouth four (4) times daily. 6 am, 10 am, 2 pm, and 6 pm    Provider, Historical   cholecalciferol, vitamin D3, (VITAMIN D3 PO) Take 5,000 Units by mouth daily.     Provider, Historical   aspirin (ASPIRIN) 325 mg tablet Take 325 mg by mouth nightly. Provider, Historical   solriamfetoL (Sunosi) 150 mg tab Take 1 Tab by mouth daily. Provider, Historical   Cetirizine 10 mg cap Take 1 Tab by mouth daily. Provider, Historical   multivitamin (ONE A DAY) tablet Take 1 Tab by mouth daily. Provider, Historical   rosuvastatin (CRESTOR) 20 mg tablet Take 20 mg by mouth nightly. Provider, Historical   lisinopril-hydrochlorothiazide (PRINZIDE, ZESTORETIC) 10-12.5 mg per tablet Take 1 Tab by mouth daily. 12/17/10   Sapna Leal MD   pramipexole (MIRAPEX) 0.5 mg tablet Take 0.5 mg by mouth every evening. 8 pm 5/7/10   Provider, Historical   montelukast (SINGULAIR) 10 mg tablet Take 10 mg by mouth daily. Provider, Historical   pramipexole (MIRAPEX) 0.125 mg tablet Take 0.125 mg by mouth daily (after lunch). 2 pm 5/7/10   Provider, Historical   cpap machine kit by Does Not Apply route. Provider, Historical       Allergies   Allergen Reactions    Keflex [Cephalexin] Hives    Pcn [Penicillins] Hives           Review of Systems: pertinent positives in HPI  Consititutional: Denies fever or chills. Eyes:  Denies use of glasses or vision problems(cataracts). ENT:  Denies hearing or swallowing difficulty. CV: Denies CP, claudication, HTN. Resp: Denies dyspnea, productive cough. : Denies dialysis or kidney problems. GI: Denies ulcers, esophageal strictures, liver problems. M/S: Denies joint or bone problems, or implanted artificial hardware. Skin: Denies varicose veins, edema. Neuro: Denies strokes, or TIAs. Psych: Denies anxiety or depression. Endocrine: Denies thyroid problems or diabetes. Heme/Lymphatic: Denies easy bruising or lymphedema. Objective:     VS: T98.5 HR 84 RR 15 sats 98% /59    Physical Exam:    General appearance: alert, cooperative, no distress  Head: normocephalic, without obvious abnormality; atraumatic  Eyes: conjunctivae/corneas clear; EOM's intact.    Nose: nares normal; no drainage. Neck: no carotid bruit and no JVD  Lungs: clear to auscultation bilaterally  Heart: regular rate and rhythm; + murmur  Abdomen: soft, non-tender; bowel sounds normal  Extremities: moves all extremities; no weakness. Skin: Skin color normal; No varicose veins or edema. Neurologic: Grossly normal      Labs: No results for input(s): WBC, HGB, HCT, PLT, NA, K, BUN, CREA, GLU, GLUCPOC, INR, HGBEXT, HCTEXT, PLTEXT, INREXT, HGBEXT, HCTEXT, PLTEXT, INREXT in the last 72 hours. No lab exists for component: GLPOC    Diagnostics:   PA and lateral: IMPRESSION: Right upper lobe nodularity. Comparison to prior radiographs  recommended.     Carotid Dopplers: 9-  1-49% BICA     PFTs: FEV1/Predicted: FEV1 3.08 (90% predicted)  FVC 4.59 )94%predicted)  DLCO 28.9 (111% predicted)  Normal FEV1 > 1 Liter, Predicted = 100%, DLCO > 80%                          Mild Lung Disease (60-70% Predicted)                          Moderate Lung Disease (50-55% Predicted)                          Severe Lung Disease (< 50% Predicted or PO2 < 60 or pCO2>50 on RA)     EK2020  NSR Rate 86  Rosy 163ms  QRSd 91ms  Assessment:     Active Problems: Aortic stenosis (10/27/2020)        Plan:   The risk and benefit of surgery were reviewed with patient and family and all questions answered and the patient wishes to proceed. Risk include infection, bleeding, stroke, heart attack, irregular heart rhythm, kidney failure and death. The patient was given instructions and prescriptions for bactroban, peridex and amiodarone. The patient was instructed to stop lisinopril-HCTZ and 325 mg ASA. Surgery is scheduled for 20. STS Risk Calculator V2.81 - Discussed by surgeon with patient.    Procedure: Isolated AVR CALCULATE   Risk of Mortality:  0.777%    Renal Failure:  1.183%    Permanent Stroke:  0.715%    Prolonged Ventilation:  2.897%    DSW Infection:  0.063%    Reoperation:  4.010%    Morbidity or Mortality:  6.043%    Short Length of Stay:  65.846%    Long Length of Stay:  1.411%         Treatment Plan:      1.  Nonrheumatic Aortic Stenosis - severe and symptomatic. AV Mean Gradient 39 mmHg, AV Peak Velocity 3.9ms. LAURA 0.8cm2. Plan for AVR with Dr. Medardo Tate. 2. CAD -Minimal by cath, ASA, Statin, No BB    3. Bilateral Carotid Artery Stenosis - Mild    4. Dyslipidemia - Zocor    5. Asthma/ZAKIA/RLS - CPAP machine, sees    Singulair, Zyrtec and Mirapex    6. Hypertension - Lisinopril/HCTZ -last dose 12/5    7. Hypersomnia - Adderal    8. Abnormal Chest Xray/Chest CT: Pt was seen by Dr. Max Feldman, cleared for surgery    9.  Further plan/care by Zoe Pollack       Signed By: Nic Sparks NP     December 1, 2020

## 2020-12-02 LAB
BACTERIA SPEC CULT: NORMAL
BACTERIA SPEC CULT: NORMAL
SERVICE CMNT-IMP: NORMAL

## 2020-12-02 RX ORDER — VANCOMYCIN/0.9 % SOD CHLORIDE 1.5G/250ML
1500 PLASTIC BAG, INJECTION (ML) INTRAVENOUS ONCE
Status: CANCELLED | OUTPATIENT
Start: 2020-12-02 | End: 2020-12-03

## 2020-12-04 ENCOUNTER — HOSPITAL ENCOUNTER (OUTPATIENT)
Dept: PREADMISSION TESTING | Age: 60
Discharge: HOME | End: 2020-12-04
Payer: COMMERCIAL

## 2020-12-04 PROCEDURE — 87635 SARS-COV-2 COVID-19 AMP PRB: CPT

## 2020-12-07 RX ORDER — NOREPINEPHRINE BITARTRATE/D5W 8 MG/250ML
2-16 PLASTIC BAG, INJECTION (ML) INTRAVENOUS
Status: DISCONTINUED | OUTPATIENT
Start: 2020-12-08 | End: 2020-12-08

## 2020-12-07 RX ORDER — POTASSIUM CHLORIDE 29.8 MG/ML
20 INJECTION INTRAVENOUS ONCE
Status: DISCONTINUED | OUTPATIENT
Start: 2020-12-08 | End: 2020-12-08

## 2020-12-07 RX ORDER — PROTAMINE SULFATE 10 MG/ML
250 INJECTION, SOLUTION INTRAVENOUS
Status: DISCONTINUED | OUTPATIENT
Start: 2020-12-08 | End: 2020-12-08

## 2020-12-07 RX ORDER — DESMOPRESSIN ACETATE 4 UG/ML
2 INJECTION, SOLUTION INTRAVENOUS; SUBCUTANEOUS ONCE
Status: DISCONTINUED | OUTPATIENT
Start: 2020-12-08 | End: 2020-12-08

## 2020-12-07 RX ORDER — MAGNESIUM SULFATE HEPTAHYDRATE 40 MG/ML
2 INJECTION, SOLUTION INTRAVENOUS ONCE
Status: DISCONTINUED | OUTPATIENT
Start: 2020-12-08 | End: 2020-12-08

## 2020-12-07 RX ORDER — DOPAMINE HYDROCHLORIDE 320 MG/100ML
5-20 INJECTION, SOLUTION INTRAVENOUS
Status: DISCONTINUED | OUTPATIENT
Start: 2020-12-08 | End: 2020-12-08

## 2020-12-07 RX ORDER — NITROGLYCERIN 20 MG/100ML
0-200 INJECTION INTRAVENOUS
Status: DISCONTINUED | OUTPATIENT
Start: 2020-12-08 | End: 2020-12-08

## 2020-12-07 RX ORDER — DOBUTAMINE HYDROCHLORIDE 200 MG/100ML
0-10 INJECTION INTRAVENOUS
Status: DISCONTINUED | OUTPATIENT
Start: 2020-12-08 | End: 2020-12-09

## 2020-12-08 ENCOUNTER — ANESTHESIA (OUTPATIENT)
Dept: CARDIOTHORACIC SURGERY | Age: 60
DRG: 220 | End: 2020-12-08
Payer: COMMERCIAL

## 2020-12-08 ENCOUNTER — HOSPITAL ENCOUNTER (INPATIENT)
Age: 60
LOS: 4 days | Discharge: HOME HEALTH | DRG: 220 | End: 2020-12-12
Attending: THORACIC SURGERY (CARDIOTHORACIC VASCULAR SURGERY) | Admitting: THORACIC SURGERY (CARDIOTHORACIC VASCULAR SURGERY)
Payer: COMMERCIAL

## 2020-12-08 ENCOUNTER — APPOINTMENT (OUTPATIENT)
Dept: GENERAL RADIOLOGY | Age: 60
DRG: 220 | End: 2020-12-08
Attending: PHYSICIAN ASSISTANT
Payer: COMMERCIAL

## 2020-12-08 ENCOUNTER — ANESTHESIA EVENT (OUTPATIENT)
Dept: CARDIOTHORACIC SURGERY | Age: 60
DRG: 220 | End: 2020-12-08
Payer: COMMERCIAL

## 2020-12-08 ENCOUNTER — HOSPITAL ENCOUNTER (OUTPATIENT)
Dept: NON INVASIVE DIAGNOSTICS | Age: 60
Discharge: HOME | End: 2020-12-08
Attending: THORACIC SURGERY (CARDIOTHORACIC VASCULAR SURGERY)

## 2020-12-08 DIAGNOSIS — Z95.2 S/P AVR (AORTIC VALVE REPLACEMENT): Primary | ICD-10-CM

## 2020-12-08 PROBLEM — I35.0 AORTIC STENOSIS, SEVERE: Status: ACTIVE | Noted: 2020-12-08

## 2020-12-08 LAB
ADMINISTERED INITIALS, ADMINIT: 117
ADMINISTERED INITIALS, ADMINIT: NORMAL
ALBUMIN SERPL-MCNC: 3.1 G/DL (ref 3.5–5)
ALBUMIN SERPL-MCNC: 3.7 G/DL (ref 3.5–5)
ALBUMIN/GLOB SERPL: 1.5 {RATIO} (ref 1.1–2.2)
ALBUMIN/GLOB SERPL: 1.8 {RATIO} (ref 1.1–2.2)
ALP SERPL-CCNC: 45 U/L (ref 45–117)
ALP SERPL-CCNC: 48 U/L (ref 45–117)
ALT SERPL-CCNC: 33 U/L (ref 12–78)
ALT SERPL-CCNC: 34 U/L (ref 12–78)
ANION GAP SERPL CALC-SCNC: 3 MMOL/L (ref 5–15)
ANION GAP SERPL CALC-SCNC: 5 MMOL/L (ref 5–15)
APTT PPP: 24.6 SEC (ref 22.1–31)
AST SERPL-CCNC: 33 U/L (ref 15–37)
AST SERPL-CCNC: 42 U/L (ref 15–37)
BASE DEFICIT BLDA-SCNC: 1.2 MMOL/L
BASE DEFICIT BLDA-SCNC: 3.5 MMOL/L
BASOPHILS # BLD: 0.1 K/UL (ref 0–0.1)
BASOPHILS NFR BLD: 1 % (ref 0–1)
BDY SITE: ABNORMAL
BDY SITE: ABNORMAL
BILIRUB SERPL-MCNC: 0.8 MG/DL (ref 0.2–1)
BILIRUB SERPL-MCNC: 1.1 MG/DL (ref 0.2–1)
BREATHS.SPONTANEOUS ON VENT: 10
BUN SERPL-MCNC: 13 MG/DL (ref 6–20)
BUN SERPL-MCNC: 14 MG/DL (ref 6–20)
BUN/CREAT SERPL: 14 (ref 12–20)
BUN/CREAT SERPL: 16 (ref 12–20)
CALCIUM SERPL-MCNC: 7.5 MG/DL (ref 8.5–10.1)
CALCIUM SERPL-MCNC: 7.6 MG/DL (ref 8.5–10.1)
CHLORIDE SERPL-SCNC: 113 MMOL/L (ref 97–108)
CHLORIDE SERPL-SCNC: 115 MMOL/L (ref 97–108)
CO2 SERPL-SCNC: 24 MMOL/L (ref 21–32)
CO2 SERPL-SCNC: 26 MMOL/L (ref 21–32)
CREAT SERPL-MCNC: 0.85 MG/DL (ref 0.7–1.3)
CREAT SERPL-MCNC: 0.9 MG/DL (ref 0.7–1.3)
D50 ADMINISTERED, D50ADM: 0 ML
D50 ORDER, D50ORD: 0 ML
DIFFERENTIAL METHOD BLD: ABNORMAL
EOSINOPHIL # BLD: 0.1 K/UL (ref 0–0.4)
EOSINOPHIL NFR BLD: 1 % (ref 0–7)
EPAP/CPAP/PEEP, PAPEEP: 6
EPAP/CPAP/PEEP, PAPEEP: 6
ERYTHROCYTE [DISTWIDTH] IN BLOOD BY AUTOMATED COUNT: 13.2 % (ref 11.5–14.5)
FIO2 ON VENT: 40 %
FIO2 ON VENT: 40 %
GAS FLOW.O2 SETTING OXYMISER: 14 L/MIN
GLOBULIN SER CALC-MCNC: 2.1 G/DL (ref 2–4)
GLOBULIN SER CALC-MCNC: 2.1 G/DL (ref 2–4)
GLSCOM COMMENTS: NORMAL
GLUCOSE BLD STRIP.AUTO-MCNC: 113 MG/DL (ref 65–100)
GLUCOSE BLD STRIP.AUTO-MCNC: 117 MG/DL (ref 65–100)
GLUCOSE BLD STRIP.AUTO-MCNC: 126 MG/DL (ref 65–100)
GLUCOSE BLD STRIP.AUTO-MCNC: 129 MG/DL (ref 65–100)
GLUCOSE BLD STRIP.AUTO-MCNC: 138 MG/DL (ref 65–100)
GLUCOSE BLD STRIP.AUTO-MCNC: 96 MG/DL (ref 65–100)
GLUCOSE SERPL-MCNC: 124 MG/DL (ref 65–100)
GLUCOSE SERPL-MCNC: 130 MG/DL (ref 65–100)
GLUCOSE, GLC: 113 MG/DL
GLUCOSE, GLC: 126 MG/DL
GLUCOSE, GLC: 126 MG/DL
GLUCOSE, GLC: 129 MG/DL
GLUCOSE, GLC: 138 MG/DL
HCO3 BLDA-SCNC: 22 MMOL/L (ref 22–26)
HCO3 BLDA-SCNC: 24 MMOL/L (ref 22–26)
HCT VFR BLD AUTO: 33.9 % (ref 36.6–50.3)
HCT VFR BLD AUTO: 36.2 % (ref 36.6–50.3)
HGB BLD-MCNC: 11.1 G/DL (ref 12.1–17)
HGB BLD-MCNC: 11.7 G/DL (ref 12.1–17)
HIGH TARGET, HITG: 130 MG/DL
HISTORY CHECKED?,CKHIST: NORMAL
IMM GRANULOCYTES # BLD AUTO: 0 K/UL (ref 0–0.04)
IMM GRANULOCYTES NFR BLD AUTO: 0 % (ref 0–0.5)
INR PPP: 1.2 (ref 0.9–1.1)
INSULIN ADMINSTERED, INSADM: 2 UNITS/HOUR
INSULIN ADMINSTERED, INSADM: 2 UNITS/HOUR
INSULIN ADMINSTERED, INSADM: 2.1 UNITS/HOUR
INSULIN ADMINSTERED, INSADM: 2.8 UNITS/HOUR
INSULIN ADMINSTERED, INSADM: 3.1 UNITS/HOUR
INSULIN ORDER, INSORD: 2 UNITS/HOUR
INSULIN ORDER, INSORD: 2 UNITS/HOUR
INSULIN ORDER, INSORD: 2.1 UNITS/HOUR
INSULIN ORDER, INSORD: 2.8 UNITS/HOUR
INSULIN ORDER, INSORD: 3.1 UNITS/HOUR
LOW TARGET, LOT: 95 MG/DL
LYMPHOCYTES # BLD: 1.8 K/UL (ref 0.8–3.5)
LYMPHOCYTES NFR BLD: 13 % (ref 12–49)
MAGNESIUM SERPL-MCNC: 2.7 MG/DL (ref 1.6–2.4)
MAGNESIUM SERPL-MCNC: 2.9 MG/DL (ref 1.6–2.4)
MCH RBC QN AUTO: 28.2 PG (ref 26–34)
MCHC RBC AUTO-ENTMCNC: 32.3 G/DL (ref 30–36.5)
MCV RBC AUTO: 87.2 FL (ref 80–99)
MINUTES UNTIL NEXT BG, NBG: 60 MIN
MONOCYTES # BLD: 0.6 K/UL (ref 0–1)
MONOCYTES NFR BLD: 4 % (ref 5–13)
MULTIPLIER, MUL: 0.03
MULTIPLIER, MUL: 0.03
MULTIPLIER, MUL: 0.04
NEUTS BAND NFR BLD MANUAL: 1 %
NEUTS SEG # BLD: 11.3 K/UL (ref 1.8–8)
NEUTS SEG NFR BLD: 80 % (ref 32–75)
NRBC # BLD: 0 K/UL (ref 0–0.01)
NRBC BLD-RTO: 0 PER 100 WBC
ORDER INITIALS, ORDINIT: 117
ORDER INITIALS, ORDINIT: NORMAL
PCO2 BLDA: 44 MMHG (ref 35–45)
PCO2 BLDA: 44 MMHG (ref 35–45)
PH BLDA: 7.33 [PH] (ref 7.35–7.45)
PH BLDA: 7.36 [PH] (ref 7.35–7.45)
PLATELET # BLD AUTO: 134 K/UL (ref 150–400)
PMV BLD AUTO: 10.8 FL (ref 8.9–12.9)
PO2 BLDA: 142 MMHG (ref 80–100)
PO2 BLDA: 145 MMHG (ref 80–100)
POTASSIUM SERPL-SCNC: 3.9 MMOL/L (ref 3.5–5.1)
POTASSIUM SERPL-SCNC: 4.1 MMOL/L (ref 3.5–5.1)
PRESSURE SUPPORT SETTING VENT: 10 CM[H2O]
PRESSURE SUPPORT SETTING VENT: 8 CM[H2O]
PROT SERPL-MCNC: 5.2 G/DL (ref 6.4–8.2)
PROT SERPL-MCNC: 5.8 G/DL (ref 6.4–8.2)
PROTHROMBIN TIME: 12.1 SEC (ref 9–11.1)
RBC # BLD AUTO: 4.15 M/UL (ref 4.1–5.7)
RBC MORPH BLD: ABNORMAL
SAO2 % BLD: 99 % (ref 92–97)
SAO2 % BLD: 99 % (ref 92–97)
SAO2% DEVICE SAO2% SENSOR NAME: ABNORMAL
SAO2% DEVICE SAO2% SENSOR NAME: ABNORMAL
SERVICE CMNT-IMP: ABNORMAL
SERVICE CMNT-IMP: NORMAL
SODIUM SERPL-SCNC: 142 MMOL/L (ref 136–145)
SODIUM SERPL-SCNC: 144 MMOL/L (ref 136–145)
SPECIMEN SITE: ABNORMAL
SPECIMEN SITE: ABNORMAL
THERAPEUTIC RANGE,PTTT: NORMAL SECS (ref 58–77)
VENTILATION MODE VENT: ABNORMAL
VENTILATION MODE VENT: ABNORMAL
VT SETTING VENT: 500 ML
WBC # BLD AUTO: 13.9 K/UL (ref 4.1–11.1)

## 2020-12-08 PROCEDURE — 77030012390 HC DRN CHST BTL GTNG -B: Performed by: THORACIC SURGERY (CARDIOTHORACIC VASCULAR SURGERY)

## 2020-12-08 PROCEDURE — 77030027138 HC INCENT SPIROMETER -A

## 2020-12-08 PROCEDURE — 77030020061 HC IV BLD WRMR ADMIN SET 3M -B: Performed by: ANESTHESIOLOGY

## 2020-12-08 PROCEDURE — C1751 CATH, INF, PER/CENT/MIDLINE: HCPCS

## 2020-12-08 PROCEDURE — 77030018673: Performed by: THORACIC SURGERY (CARDIOTHORACIC VASCULAR SURGERY)

## 2020-12-08 PROCEDURE — 76060000040 HC ANESTHESIA 4.5 TO 5 HR: Performed by: THORACIC SURGERY (CARDIOTHORACIC VASCULAR SURGERY)

## 2020-12-08 PROCEDURE — 77030018846 HC SOL IRR STRL H20 ICUM -A: Performed by: THORACIC SURGERY (CARDIOTHORACIC VASCULAR SURGERY)

## 2020-12-08 PROCEDURE — 77030041076 HC DRSG AG OPTICELL MDII -A: Performed by: THORACIC SURGERY (CARDIOTHORACIC VASCULAR SURGERY)

## 2020-12-08 PROCEDURE — 94002 VENT MGMT INPAT INIT DAY: CPT

## 2020-12-08 PROCEDURE — 77030020167 HC PERF SET MULT MEDT -B: Performed by: THORACIC SURGERY (CARDIOTHORACIC VASCULAR SURGERY)

## 2020-12-08 PROCEDURE — 74011000258 HC RX REV CODE- 258: Performed by: PHYSICIAN ASSISTANT

## 2020-12-08 PROCEDURE — 74011250637 HC RX REV CODE- 250/637: Performed by: PHYSICIAN ASSISTANT

## 2020-12-08 PROCEDURE — 85025 COMPLETE CBC W/AUTO DIFF WBC: CPT

## 2020-12-08 PROCEDURE — 74011636637 HC RX REV CODE- 636/637: Performed by: PHYSICIAN ASSISTANT

## 2020-12-08 PROCEDURE — 88305 TISSUE EXAM BY PATHOLOGIST: CPT

## 2020-12-08 PROCEDURE — 82962 GLUCOSE BLOOD TEST: CPT

## 2020-12-08 PROCEDURE — 74011250636 HC RX REV CODE- 250/636: Performed by: NURSE ANESTHETIST, CERTIFIED REGISTERED

## 2020-12-08 PROCEDURE — 82330 ASSAY OF CALCIUM: CPT

## 2020-12-08 PROCEDURE — 71045 X-RAY EXAM CHEST 1 VIEW: CPT

## 2020-12-08 PROCEDURE — 77030019908 HC STETH ESOPH SIMS -A: Performed by: ANESTHESIOLOGY

## 2020-12-08 PROCEDURE — 77030018986 HC SUT ETHBND4 J&J -B: Performed by: THORACIC SURGERY (CARDIOTHORACIC VASCULAR SURGERY)

## 2020-12-08 PROCEDURE — 85610 PROTHROMBIN TIME: CPT

## 2020-12-08 PROCEDURE — 77030026438 HC STYL ET INTUB CARD -A: Performed by: ANESTHESIOLOGY

## 2020-12-08 PROCEDURE — 77030007667 HC INSRT SUT HLD MEDT -B: Performed by: THORACIC SURGERY (CARDIOTHORACIC VASCULAR SURGERY)

## 2020-12-08 PROCEDURE — 77030002996 HC SUT SLK J&J -A: Performed by: THORACIC SURGERY (CARDIOTHORACIC VASCULAR SURGERY)

## 2020-12-08 PROCEDURE — 77030003020 HC SUT TICRN COVD -A: Performed by: THORACIC SURGERY (CARDIOTHORACIC VASCULAR SURGERY)

## 2020-12-08 PROCEDURE — 77030011235 HC DRN PERCRD SUMP MEDT -B: Performed by: THORACIC SURGERY (CARDIOTHORACIC VASCULAR SURGERY)

## 2020-12-08 PROCEDURE — P9045 ALBUMIN (HUMAN), 5%, 250 ML: HCPCS | Performed by: NURSE ANESTHETIST, CERTIFIED REGISTERED

## 2020-12-08 PROCEDURE — 74011250636 HC RX REV CODE- 250/636: Performed by: PHYSICIAN ASSISTANT

## 2020-12-08 PROCEDURE — 77030006994: Performed by: THORACIC SURGERY (CARDIOTHORACIC VASCULAR SURGERY)

## 2020-12-08 PROCEDURE — 74011000258 HC RX REV CODE- 258: Performed by: NURSE ANESTHETIST, CERTIFIED REGISTERED

## 2020-12-08 PROCEDURE — 77030040361 HC SLV COMPR DVT MDII -B

## 2020-12-08 PROCEDURE — 77030018835 HC SOL IRR LR ICUM -A: Performed by: THORACIC SURGERY (CARDIOTHORACIC VASCULAR SURGERY)

## 2020-12-08 PROCEDURE — 2709999900 HC NON-CHARGEABLE SUPPLY

## 2020-12-08 PROCEDURE — 77030034936 HC DEV MIN COR-KNOT KT LSIS -F: Performed by: THORACIC SURGERY (CARDIOTHORACIC VASCULAR SURGERY)

## 2020-12-08 PROCEDURE — 77030021678 HC GLIDESCP STAT DISP VERT -B: Performed by: ANESTHESIOLOGY

## 2020-12-08 PROCEDURE — 83735 ASSAY OF MAGNESIUM: CPT

## 2020-12-08 PROCEDURE — 77030033068 HC DEV COR-KNOT SUT KT LSIS -E: Performed by: THORACIC SURGERY (CARDIOTHORACIC VASCULAR SURGERY)

## 2020-12-08 PROCEDURE — 77030040504 HC DRN WND MDII -B: Performed by: THORACIC SURGERY (CARDIOTHORACIC VASCULAR SURGERY)

## 2020-12-08 PROCEDURE — 2709999900 HC NON-CHARGEABLE SUPPLY: Performed by: THORACIC SURGERY (CARDIOTHORACIC VASCULAR SURGERY)

## 2020-12-08 PROCEDURE — 85018 HEMOGLOBIN: CPT

## 2020-12-08 PROCEDURE — 74011250636 HC RX REV CODE- 250/636: Performed by: NURSE PRACTITIONER

## 2020-12-08 PROCEDURE — 36415 COLL VENOUS BLD VENIPUNCTURE: CPT

## 2020-12-08 PROCEDURE — 74011250636 HC RX REV CODE- 250/636: Performed by: ANESTHESIOLOGY

## 2020-12-08 PROCEDURE — 88311 DECALCIFY TISSUE: CPT

## 2020-12-08 PROCEDURE — 77030013079 HC BLNKT BAIR HGGR 3M -A: Performed by: ANESTHESIOLOGY

## 2020-12-08 PROCEDURE — 77030020256 HC SOL INJ NACL 0.9%  500ML: Performed by: THORACIC SURGERY (CARDIOTHORACIC VASCULAR SURGERY)

## 2020-12-08 PROCEDURE — 02RF08Z REPLACEMENT OF AORTIC VALVE WITH ZOOPLASTIC TISSUE, OPEN APPROACH: ICD-10-PCS | Performed by: THORACIC SURGERY (CARDIOTHORACIC VASCULAR SURGERY)

## 2020-12-08 PROCEDURE — 65620000000 HC RM CCU GENERAL

## 2020-12-08 PROCEDURE — 33405 REPLACEMENT AORTIC VALVE OPN: CPT | Performed by: THORACIC SURGERY (CARDIOTHORACIC VASCULAR SURGERY)

## 2020-12-08 PROCEDURE — 77030010797: Performed by: THORACIC SURGERY (CARDIOTHORACIC VASCULAR SURGERY)

## 2020-12-08 PROCEDURE — 77030020263 HC SOL INJ SOD CL0.9% LFCR 1000ML: Performed by: THORACIC SURGERY (CARDIOTHORACIC VASCULAR SURGERY)

## 2020-12-08 PROCEDURE — 77030014008 HC SPNG HEMSTAT J&J -C: Performed by: THORACIC SURGERY (CARDIOTHORACIC VASCULAR SURGERY)

## 2020-12-08 PROCEDURE — 77030002986 HC SUT PROL J&J -A: Performed by: THORACIC SURGERY (CARDIOTHORACIC VASCULAR SURGERY)

## 2020-12-08 PROCEDURE — 77030018836 HC SOL IRR NACL ICUM -A: Performed by: THORACIC SURGERY (CARDIOTHORACIC VASCULAR SURGERY)

## 2020-12-08 PROCEDURE — 74011000250 HC RX REV CODE- 250: Performed by: NURSE ANESTHETIST, CERTIFIED REGISTERED

## 2020-12-08 PROCEDURE — 74011250636 HC RX REV CODE- 250/636: Performed by: THORACIC SURGERY (CARDIOTHORACIC VASCULAR SURGERY)

## 2020-12-08 PROCEDURE — B24BZZ4 ULTRASONOGRAPHY OF HEART WITH AORTA, TRANSESOPHAGEAL: ICD-10-PCS | Performed by: ANESTHESIOLOGY

## 2020-12-08 PROCEDURE — 02HV33Z INSERTION OF INFUSION DEVICE INTO SUPERIOR VENA CAVA, PERCUTANEOUS APPROACH: ICD-10-PCS | Performed by: ANESTHESIOLOGY

## 2020-12-08 PROCEDURE — 03HY32Z INSERTION OF MONITORING DEVICE INTO UPPER ARTERY, PERCUTANEOUS APPROACH: ICD-10-PCS | Performed by: ANESTHESIOLOGY

## 2020-12-08 PROCEDURE — 77030005513 HC CATH URETH FOL11 MDII -B: Performed by: THORACIC SURGERY (CARDIOTHORACIC VASCULAR SURGERY)

## 2020-12-08 PROCEDURE — 80053 COMPREHEN METABOLIC PANEL: CPT

## 2020-12-08 PROCEDURE — P9045 ALBUMIN (HUMAN), 5%, 250 ML: HCPCS | Performed by: PHYSICIAN ASSISTANT

## 2020-12-08 PROCEDURE — 77030002933 HC SUT MCRYL J&J -A: Performed by: THORACIC SURGERY (CARDIOTHORACIC VASCULAR SURGERY)

## 2020-12-08 PROCEDURE — 77030008684 HC TU ET CUF COVD -B: Performed by: ANESTHESIOLOGY

## 2020-12-08 PROCEDURE — 77030008771 HC TU NG SALEM SUMP -A: Performed by: ANESTHESIOLOGY

## 2020-12-08 PROCEDURE — 77030006920 HC BLD STRNL SAW STRY -B: Performed by: THORACIC SURGERY (CARDIOTHORACIC VASCULAR SURGERY)

## 2020-12-08 PROCEDURE — 77030013798 HC KT TRNSDUC PRSSR EDWD -B: Performed by: THORACIC SURGERY (CARDIOTHORACIC VASCULAR SURGERY)

## 2020-12-08 PROCEDURE — 82803 BLOOD GASES ANY COMBINATION: CPT

## 2020-12-08 PROCEDURE — 03HY33Z INSERTION OF INFUSION DEVICE INTO UPPER ARTERY, PERCUTANEOUS APPROACH: ICD-10-PCS | Performed by: ANESTHESIOLOGY

## 2020-12-08 PROCEDURE — 5A1221Z PERFORMANCE OF CARDIAC OUTPUT, CONTINUOUS: ICD-10-PCS | Performed by: THORACIC SURGERY (CARDIOTHORACIC VASCULAR SURGERY)

## 2020-12-08 PROCEDURE — 76010000115 HC CV SURG 4.5 TO 5 HR: Performed by: THORACIC SURGERY (CARDIOTHORACIC VASCULAR SURGERY)

## 2020-12-08 PROCEDURE — 77030037878 HC DRSG MEPILEX >48IN BORD MOLN -B: Performed by: THORACIC SURGERY (CARDIOTHORACIC VASCULAR SURGERY)

## 2020-12-08 PROCEDURE — 77030018729 HC ELECTRD DEFIB PAD CARD -B: Performed by: THORACIC SURGERY (CARDIOTHORACIC VASCULAR SURGERY)

## 2020-12-08 PROCEDURE — 74011000250 HC RX REV CODE- 250

## 2020-12-08 PROCEDURE — 77030005402 HC CATH RAD ART LN KT TELE -B

## 2020-12-08 PROCEDURE — 77030041469 HC VLV AORT INSPIRIS EDWD -K4: Performed by: THORACIC SURGERY (CARDIOTHORACIC VASCULAR SURGERY)

## 2020-12-08 PROCEDURE — 74011000250 HC RX REV CODE- 250: Performed by: PHYSICIAN ASSISTANT

## 2020-12-08 PROCEDURE — 77030003029 HC SUT VCRL J&J -B: Performed by: THORACIC SURGERY (CARDIOTHORACIC VASCULAR SURGERY)

## 2020-12-08 PROCEDURE — 77030003010 HC SUT SURG STL J&J -B: Performed by: THORACIC SURGERY (CARDIOTHORACIC VASCULAR SURGERY)

## 2020-12-08 PROCEDURE — 74011250637 HC RX REV CODE- 250/637: Performed by: THORACIC SURGERY (CARDIOTHORACIC VASCULAR SURGERY)

## 2020-12-08 PROCEDURE — 85730 THROMBOPLASTIN TIME PARTIAL: CPT

## 2020-12-08 PROCEDURE — 77030010505 HC ADH BIOGLU CRYO -F: Performed by: THORACIC SURGERY (CARDIOTHORACIC VASCULAR SURGERY)

## 2020-12-08 PROCEDURE — 77030002912 HC SUT ETHBND J&J -A: Performed by: THORACIC SURGERY (CARDIOTHORACIC VASCULAR SURGERY)

## 2020-12-08 DEVICE — VALVE AORTIC 23MM -- INSPIRIS RESILIA: Type: IMPLANTABLE DEVICE | Site: AORTIC VALVE | Status: FUNCTIONAL

## 2020-12-08 RX ORDER — POTASSIUM CHLORIDE 29.8 MG/ML
20 INJECTION INTRAVENOUS ONCE
Status: COMPLETED | OUTPATIENT
Start: 2020-12-08 | End: 2020-12-08

## 2020-12-08 RX ORDER — ROSUVASTATIN CALCIUM 20 MG/1
20 TABLET, COATED ORAL
Status: DISCONTINUED | OUTPATIENT
Start: 2020-12-08 | End: 2020-12-12 | Stop reason: HOSPADM

## 2020-12-08 RX ORDER — MUPIROCIN 20 MG/G
OINTMENT TOPICAL 2 TIMES DAILY
Status: DISCONTINUED | OUTPATIENT
Start: 2020-12-08 | End: 2020-12-12 | Stop reason: HOSPADM

## 2020-12-08 RX ORDER — MIDAZOLAM HYDROCHLORIDE 1 MG/ML
INJECTION, SOLUTION INTRAMUSCULAR; INTRAVENOUS AS NEEDED
Status: DISCONTINUED | OUTPATIENT
Start: 2020-12-08 | End: 2020-12-08 | Stop reason: HOSPADM

## 2020-12-08 RX ORDER — SODIUM CHLORIDE, SODIUM LACTATE, POTASSIUM CHLORIDE, CALCIUM CHLORIDE 600; 310; 30; 20 MG/100ML; MG/100ML; MG/100ML; MG/100ML
INJECTION, SOLUTION INTRAVENOUS
Status: DISCONTINUED | OUTPATIENT
Start: 2020-12-08 | End: 2020-12-08 | Stop reason: HOSPADM

## 2020-12-08 RX ORDER — OXYCODONE HYDROCHLORIDE 5 MG/1
5 TABLET ORAL
Status: DISCONTINUED | OUTPATIENT
Start: 2020-12-08 | End: 2020-12-12 | Stop reason: HOSPADM

## 2020-12-08 RX ORDER — LANOLIN ALCOHOL/MO/W.PET/CERES
400 CREAM (GRAM) TOPICAL 2 TIMES DAILY
Status: DISCONTINUED | OUTPATIENT
Start: 2020-12-09 | End: 2020-12-12 | Stop reason: HOSPADM

## 2020-12-08 RX ORDER — SODIUM CHLORIDE, SODIUM LACTATE, POTASSIUM CHLORIDE, CALCIUM CHLORIDE 600; 310; 30; 20 MG/100ML; MG/100ML; MG/100ML; MG/100ML
25 INJECTION, SOLUTION INTRAVENOUS CONTINUOUS
Status: DISCONTINUED | OUTPATIENT
Start: 2020-12-08 | End: 2020-12-08

## 2020-12-08 RX ORDER — FAMOTIDINE 20 MG/1
20 TABLET, FILM COATED ORAL EVERY 12 HOURS
Status: DISCONTINUED | OUTPATIENT
Start: 2020-12-09 | End: 2020-12-09

## 2020-12-08 RX ORDER — DIPHENHYDRAMINE HYDROCHLORIDE 50 MG/ML
25 INJECTION, SOLUTION INTRAMUSCULAR; INTRAVENOUS
Status: DISCONTINUED | OUTPATIENT
Start: 2020-12-08 | End: 2020-12-12 | Stop reason: HOSPADM

## 2020-12-08 RX ORDER — MAGNESIUM SULFATE 1 G/100ML
1 INJECTION INTRAVENOUS AS NEEDED
Status: DISCONTINUED | OUTPATIENT
Start: 2020-12-08 | End: 2020-12-10

## 2020-12-08 RX ORDER — SODIUM CHLORIDE 0.9 % (FLUSH) 0.9 %
5-40 SYRINGE (ML) INJECTION AS NEEDED
Status: DISCONTINUED | OUTPATIENT
Start: 2020-12-08 | End: 2020-12-08

## 2020-12-08 RX ORDER — OXYCODONE HYDROCHLORIDE 5 MG/1
10 TABLET ORAL
Status: DISCONTINUED | OUTPATIENT
Start: 2020-12-08 | End: 2020-12-12 | Stop reason: HOSPADM

## 2020-12-08 RX ORDER — ONDANSETRON 2 MG/ML
4 INJECTION INTRAMUSCULAR; INTRAVENOUS
Status: DISCONTINUED | OUTPATIENT
Start: 2020-12-08 | End: 2020-12-09

## 2020-12-08 RX ORDER — PRAMIPEXOLE DIHYDROCHLORIDE 0.25 MG/1
0.12 TABLET ORAL
Status: DISCONTINUED | OUTPATIENT
Start: 2020-12-09 | End: 2020-12-12 | Stop reason: HOSPADM

## 2020-12-08 RX ORDER — SODIUM CHLORIDE 0.9 % (FLUSH) 0.9 %
5-40 SYRINGE (ML) INJECTION EVERY 8 HOURS
Status: DISCONTINUED | OUTPATIENT
Start: 2020-12-08 | End: 2020-12-10

## 2020-12-08 RX ORDER — DEXMEDETOMIDINE HYDROCHLORIDE 4 UG/ML
INJECTION, SOLUTION INTRAVENOUS
Status: DISCONTINUED | OUTPATIENT
Start: 2020-12-08 | End: 2020-12-08 | Stop reason: HOSPADM

## 2020-12-08 RX ORDER — DEXTROSE 50 % IN WATER (D50W) INTRAVENOUS SYRINGE
12.5-25 AS NEEDED
Status: DISCONTINUED | OUTPATIENT
Start: 2020-12-08 | End: 2020-12-11

## 2020-12-08 RX ORDER — DIPHENHYDRAMINE HCL 25 MG
25 CAPSULE ORAL
Status: DISCONTINUED | OUTPATIENT
Start: 2020-12-08 | End: 2020-12-12 | Stop reason: HOSPADM

## 2020-12-08 RX ORDER — KETOROLAC TROMETHAMINE 30 MG/ML
15 INJECTION, SOLUTION INTRAMUSCULAR; INTRAVENOUS EVERY 6 HOURS
Status: COMPLETED | OUTPATIENT
Start: 2020-12-08 | End: 2020-12-09

## 2020-12-08 RX ORDER — INSULIN LISPRO 100 [IU]/ML
INJECTION, SOLUTION INTRAVENOUS; SUBCUTANEOUS
Status: DISCONTINUED | OUTPATIENT
Start: 2020-12-10 | End: 2020-12-11

## 2020-12-08 RX ORDER — ROCURONIUM BROMIDE 10 MG/ML
INJECTION, SOLUTION INTRAVENOUS AS NEEDED
Status: DISCONTINUED | OUTPATIENT
Start: 2020-12-08 | End: 2020-12-08 | Stop reason: HOSPADM

## 2020-12-08 RX ORDER — BACITRACIN 500 UNIT/G
1 PACKET (EA) TOPICAL AS NEEDED
Status: DISCONTINUED | OUTPATIENT
Start: 2020-12-08 | End: 2020-12-10

## 2020-12-08 RX ORDER — PROTAMINE SULFATE 10 MG/ML
INJECTION, SOLUTION INTRAVENOUS AS NEEDED
Status: DISCONTINUED | OUTPATIENT
Start: 2020-12-08 | End: 2020-12-08 | Stop reason: HOSPADM

## 2020-12-08 RX ORDER — SODIUM CHLORIDE 9 MG/ML
INJECTION, SOLUTION INTRAVENOUS
Status: DISCONTINUED | OUTPATIENT
Start: 2020-12-08 | End: 2020-12-08 | Stop reason: HOSPADM

## 2020-12-08 RX ORDER — NALOXONE HYDROCHLORIDE 0.4 MG/ML
0.4 INJECTION, SOLUTION INTRAMUSCULAR; INTRAVENOUS; SUBCUTANEOUS AS NEEDED
Status: DISCONTINUED | OUTPATIENT
Start: 2020-12-08 | End: 2020-12-12 | Stop reason: HOSPADM

## 2020-12-08 RX ORDER — CHLORHEXIDINE GLUCONATE 1.2 MG/ML
10 RINSE ORAL EVERY 12 HOURS
Status: DISCONTINUED | OUTPATIENT
Start: 2020-12-08 | End: 2020-12-12 | Stop reason: HOSPADM

## 2020-12-08 RX ORDER — AMOXICILLIN 250 MG
1 CAPSULE ORAL 2 TIMES DAILY
Status: DISCONTINUED | OUTPATIENT
Start: 2020-12-09 | End: 2020-12-12 | Stop reason: HOSPADM

## 2020-12-08 RX ORDER — AMIODARONE HYDROCHLORIDE 200 MG/1
400 TABLET ORAL EVERY 12 HOURS
Status: DISCONTINUED | OUTPATIENT
Start: 2020-12-09 | End: 2020-12-09

## 2020-12-08 RX ORDER — FENTANYL CITRATE 50 UG/ML
INJECTION, SOLUTION INTRAMUSCULAR; INTRAVENOUS AS NEEDED
Status: DISCONTINUED | OUTPATIENT
Start: 2020-12-08 | End: 2020-12-08 | Stop reason: HOSPADM

## 2020-12-08 RX ORDER — PROPOFOL 10 MG/ML
INJECTION, EMULSION INTRAVENOUS AS NEEDED
Status: DISCONTINUED | OUTPATIENT
Start: 2020-12-08 | End: 2020-12-08 | Stop reason: HOSPADM

## 2020-12-08 RX ORDER — ALBUTEROL SULFATE 0.83 MG/ML
2.5 SOLUTION RESPIRATORY (INHALATION)
Status: DISCONTINUED | OUTPATIENT
Start: 2020-12-08 | End: 2020-12-12 | Stop reason: HOSPADM

## 2020-12-08 RX ORDER — HEPARIN SODIUM 1000 [USP'U]/ML
INJECTION, SOLUTION INTRAVENOUS; SUBCUTANEOUS AS NEEDED
Status: DISCONTINUED | OUTPATIENT
Start: 2020-12-08 | End: 2020-12-08 | Stop reason: HOSPADM

## 2020-12-08 RX ORDER — DESMOPRESSIN ACETATE 4 UG/ML
INJECTION, SOLUTION INTRAVENOUS; SUBCUTANEOUS AS NEEDED
Status: DISCONTINUED | OUTPATIENT
Start: 2020-12-08 | End: 2020-12-08 | Stop reason: HOSPADM

## 2020-12-08 RX ORDER — INSULIN GLARGINE 100 [IU]/ML
1-50 INJECTION, SOLUTION SUBCUTANEOUS
Status: ACTIVE | OUTPATIENT
Start: 2020-12-08 | End: 2020-12-09

## 2020-12-08 RX ORDER — INSULIN LISPRO 100 [IU]/ML
INJECTION, SOLUTION INTRAVENOUS; SUBCUTANEOUS
Status: DISCONTINUED | OUTPATIENT
Start: 2020-12-09 | End: 2020-12-10

## 2020-12-08 RX ORDER — HYDROMORPHONE HYDROCHLORIDE 1 MG/ML
0.5 INJECTION, SOLUTION INTRAMUSCULAR; INTRAVENOUS; SUBCUTANEOUS
Status: DISCONTINUED | OUTPATIENT
Start: 2020-12-08 | End: 2020-12-10

## 2020-12-08 RX ORDER — POLYETHYLENE GLYCOL 3350 17 G/17G
17 POWDER, FOR SOLUTION ORAL DAILY
Status: DISCONTINUED | OUTPATIENT
Start: 2020-12-09 | End: 2020-12-12 | Stop reason: HOSPADM

## 2020-12-08 RX ORDER — SODIUM CHLORIDE 9 MG/ML
9 INJECTION, SOLUTION INTRAVENOUS CONTINUOUS
Status: DISCONTINUED | OUTPATIENT
Start: 2020-12-08 | End: 2020-12-09

## 2020-12-08 RX ORDER — HYDROMORPHONE HYDROCHLORIDE 1 MG/ML
1 INJECTION, SOLUTION INTRAMUSCULAR; INTRAVENOUS; SUBCUTANEOUS
Status: DISCONTINUED | OUTPATIENT
Start: 2020-12-08 | End: 2020-12-10

## 2020-12-08 RX ORDER — VANCOMYCIN/0.9 % SOD CHLORIDE 1.5G/250ML
1.5 PLASTIC BAG, INJECTION (ML) INTRAVENOUS EVERY 12 HOURS
Status: COMPLETED | OUTPATIENT
Start: 2020-12-08 | End: 2020-12-10

## 2020-12-08 RX ORDER — MIDAZOLAM HYDROCHLORIDE 1 MG/ML
1 INJECTION, SOLUTION INTRAMUSCULAR; INTRAVENOUS
Status: DISCONTINUED | OUTPATIENT
Start: 2020-12-08 | End: 2020-12-10

## 2020-12-08 RX ORDER — MAGNESIUM SULFATE 1 G/100ML
INJECTION INTRAVENOUS AS NEEDED
Status: DISCONTINUED | OUTPATIENT
Start: 2020-12-08 | End: 2020-12-08 | Stop reason: HOSPADM

## 2020-12-08 RX ORDER — SODIUM CHLORIDE 0.9 % (FLUSH) 0.9 %
5-40 SYRINGE (ML) INJECTION AS NEEDED
Status: DISCONTINUED | OUTPATIENT
Start: 2020-12-08 | End: 2020-12-10

## 2020-12-08 RX ORDER — MAGNESIUM SULFATE 100 %
4 CRYSTALS MISCELLANEOUS AS NEEDED
Status: DISCONTINUED | OUTPATIENT
Start: 2020-12-08 | End: 2020-12-11

## 2020-12-08 RX ORDER — LANOLIN ALCOHOL/MO/W.PET/CERES
3 CREAM (GRAM) TOPICAL
Status: DISCONTINUED | OUTPATIENT
Start: 2020-12-08 | End: 2020-12-12 | Stop reason: HOSPADM

## 2020-12-08 RX ORDER — POTASSIUM CHLORIDE 29.8 MG/ML
20 INJECTION INTRAVENOUS
Status: ACTIVE | OUTPATIENT
Start: 2020-12-08 | End: 2020-12-09

## 2020-12-08 RX ORDER — POTASSIUM CHLORIDE 29.8 MG/ML
20 INJECTION INTRAVENOUS
Status: DISPENSED | OUTPATIENT
Start: 2020-12-08 | End: 2020-12-09

## 2020-12-08 RX ORDER — ALBUMIN HUMAN 50 G/1000ML
SOLUTION INTRAVENOUS AS NEEDED
Status: DISCONTINUED | OUTPATIENT
Start: 2020-12-08 | End: 2020-12-08 | Stop reason: HOSPADM

## 2020-12-08 RX ORDER — SODIUM CHLORIDE 450 MG/100ML
10 INJECTION, SOLUTION INTRAVENOUS CONTINUOUS
Status: DISCONTINUED | OUTPATIENT
Start: 2020-12-08 | End: 2020-12-09

## 2020-12-08 RX ORDER — VANCOMYCIN/0.9 % SOD CHLORIDE 1.5G/250ML
1500 PLASTIC BAG, INJECTION (ML) INTRAVENOUS ONCE
Status: COMPLETED | OUTPATIENT
Start: 2020-12-08 | End: 2020-12-08

## 2020-12-08 RX ORDER — SODIUM BICARBONATE 1 MEQ/ML
SYRINGE (ML) INTRAVENOUS
Status: COMPLETED
Start: 2020-12-08 | End: 2020-12-08

## 2020-12-08 RX ORDER — SUCCINYLCHOLINE CHLORIDE 20 MG/ML
INJECTION INTRAMUSCULAR; INTRAVENOUS AS NEEDED
Status: DISCONTINUED | OUTPATIENT
Start: 2020-12-08 | End: 2020-12-08 | Stop reason: HOSPADM

## 2020-12-08 RX ORDER — HEPARIN SODIUM 1000 [USP'U]/ML
2000 INJECTION, SOLUTION INTRAVENOUS; SUBCUTANEOUS ONCE
Status: COMPLETED | OUTPATIENT
Start: 2020-12-08 | End: 2020-12-08

## 2020-12-08 RX ORDER — ALBUMIN HUMAN 50 G/1000ML
12.5 SOLUTION INTRAVENOUS
Status: COMPLETED | OUTPATIENT
Start: 2020-12-08 | End: 2020-12-08

## 2020-12-08 RX ORDER — SODIUM CHLORIDE 0.9 % (FLUSH) 0.9 %
5-40 SYRINGE (ML) INJECTION EVERY 8 HOURS
Status: DISCONTINUED | OUTPATIENT
Start: 2020-12-08 | End: 2020-12-08

## 2020-12-08 RX ORDER — SUFENTANIL CITRATE 50 UG/ML
INJECTION EPIDURAL; INTRAVENOUS
Status: DISCONTINUED | OUTPATIENT
Start: 2020-12-08 | End: 2020-12-08 | Stop reason: HOSPADM

## 2020-12-08 RX ORDER — LIDOCAINE HYDROCHLORIDE 20 MG/ML
INJECTION, SOLUTION EPIDURAL; INFILTRATION; INTRACAUDAL; PERINEURAL AS NEEDED
Status: DISCONTINUED | OUTPATIENT
Start: 2020-12-08 | End: 2020-12-08 | Stop reason: HOSPADM

## 2020-12-08 RX ORDER — SUFENTANIL CITRATE 50 UG/ML
INJECTION EPIDURAL; INTRAVENOUS AS NEEDED
Status: DISCONTINUED | OUTPATIENT
Start: 2020-12-08 | End: 2020-12-08 | Stop reason: HOSPADM

## 2020-12-08 RX ORDER — PRAMIPEXOLE DIHYDROCHLORIDE 1 MG/1
0.5 TABLET ORAL EVERY EVENING
Status: DISCONTINUED | OUTPATIENT
Start: 2020-12-08 | End: 2020-12-12 | Stop reason: HOSPADM

## 2020-12-08 RX ORDER — FACIAL-BODY WIPES
10 EACH TOPICAL DAILY PRN
Status: DISCONTINUED | OUTPATIENT
Start: 2020-12-08 | End: 2020-12-12 | Stop reason: HOSPADM

## 2020-12-08 RX ORDER — GUAIFENESIN 100 MG/5ML
81 LIQUID (ML) ORAL DAILY
Status: DISCONTINUED | OUTPATIENT
Start: 2020-12-09 | End: 2020-12-12 | Stop reason: HOSPADM

## 2020-12-08 RX ORDER — ACETAMINOPHEN 325 MG/1
650 TABLET ORAL EVERY 4 HOURS
Status: DISPENSED | OUTPATIENT
Start: 2020-12-08 | End: 2020-12-10

## 2020-12-08 RX ADMIN — SUFENTANIL CITRATE 30 MCG: 50 INJECTION EPIDURAL; INTRAVENOUS at 13:19

## 2020-12-08 RX ADMIN — ALBUMIN (HUMAN) 250 ML: 2.5 SOLUTION INTRAVENOUS at 16:44

## 2020-12-08 RX ADMIN — DEXTROSE MONOHYDRATE 0.3 MG: 50 INJECTION, SOLUTION INTRAVENOUS at 16:16

## 2020-12-08 RX ADMIN — SODIUM CHLORIDE 5 MG/HR: 900 INJECTION, SOLUTION INTRAVENOUS at 18:00

## 2020-12-08 RX ADMIN — SODIUM CHLORIDE, POTASSIUM CHLORIDE, SODIUM LACTATE AND CALCIUM CHLORIDE: 600; 310; 30; 20 INJECTION, SOLUTION INTRAVENOUS at 12:00

## 2020-12-08 RX ADMIN — HEPARIN SODIUM 50000 UNITS: 1000 INJECTION, SOLUTION INTRAVENOUS; SUBCUTANEOUS at 13:58

## 2020-12-08 RX ADMIN — SODIUM CHLORIDE, POTASSIUM CHLORIDE, SODIUM LACTATE AND CALCIUM CHLORIDE 25 ML/HR: 600; 310; 30; 20 INJECTION, SOLUTION INTRAVENOUS at 11:05

## 2020-12-08 RX ADMIN — MIDAZOLAM HYDROCHLORIDE 1 MG: 1 INJECTION, SOLUTION INTRAMUSCULAR; INTRAVENOUS at 10:51

## 2020-12-08 RX ADMIN — DEXMEDETOMIDINE HYDROCHLORIDE 0.4 MCG/KG/HR: 100 INJECTION, SOLUTION, CONCENTRATE INTRAVENOUS at 15:46

## 2020-12-08 RX ADMIN — FENTANYL CITRATE 50 MCG: 50 INJECTION, SOLUTION INTRAMUSCULAR; INTRAVENOUS at 13:16

## 2020-12-08 RX ADMIN — SODIUM CHLORIDE 10 ML/HR: 4.5 INJECTION, SOLUTION INTRAVENOUS at 19:00

## 2020-12-08 RX ADMIN — PROTAMINE SULFATE 350 MG: 10 INJECTION, SOLUTION INTRAVENOUS at 16:14

## 2020-12-08 RX ADMIN — MIDAZOLAM HYDROCHLORIDE 2 MG: 1 INJECTION, SOLUTION INTRAMUSCULAR; INTRAVENOUS at 13:12

## 2020-12-08 RX ADMIN — HEPARIN SODIUM 2000 UNITS: 1000 INJECTION, SOLUTION INTRAVENOUS; SUBCUTANEOUS at 14:00

## 2020-12-08 RX ADMIN — SUFENTANIL CITRATE 20 MCG: 50 INJECTION EPIDURAL; INTRAVENOUS at 16:53

## 2020-12-08 RX ADMIN — ROCURONIUM BROMIDE 40 MG: 10 INJECTION INTRAVENOUS at 13:31

## 2020-12-08 RX ADMIN — FENTANYL CITRATE 50 MCG: 50 INJECTION, SOLUTION INTRAMUSCULAR; INTRAVENOUS at 17:12

## 2020-12-08 RX ADMIN — SUFENTANIL CITRATE 20 MCG: 50 INJECTION EPIDURAL; INTRAVENOUS at 13:48

## 2020-12-08 RX ADMIN — PROPOFOL 100 MG: 10 INJECTION, EMULSION INTRAVENOUS at 13:18

## 2020-12-08 RX ADMIN — KETOROLAC TROMETHAMINE 15 MG: 30 INJECTION, SOLUTION INTRAMUSCULAR at 20:32

## 2020-12-08 RX ADMIN — VANCOMYCIN HYDROCHLORIDE 1500 MG: 10 INJECTION, POWDER, LYOPHILIZED, FOR SOLUTION INTRAVENOUS at 23:06

## 2020-12-08 RX ADMIN — SODIUM CHLORIDE: 9 INJECTION, SOLUTION INTRAVENOUS at 13:14

## 2020-12-08 RX ADMIN — Medication 3 AMPULE: at 11:08

## 2020-12-08 RX ADMIN — CHLORHEXIDINE GLUCONATE 0.12% ORAL RINSE 10 ML: 1.2 LIQUID ORAL at 20:05

## 2020-12-08 RX ADMIN — FAMOTIDINE 20 MG: 10 INJECTION INTRAVENOUS at 20:06

## 2020-12-08 RX ADMIN — PRAMIPEXOLE DIHYDROCHLORIDE 0.5 MG: 1 TABLET ORAL at 20:24

## 2020-12-08 RX ADMIN — SODIUM CHLORIDE 10 G/HR: 900 INJECTION, SOLUTION INTRAVENOUS at 13:19

## 2020-12-08 RX ADMIN — SUFENTANIL CITRATE 0.4 MCG/KG/HR: 50 INJECTION EPIDURAL; INTRAVENOUS at 13:19

## 2020-12-08 RX ADMIN — SODIUM CHLORIDE, POTASSIUM CHLORIDE, SODIUM LACTATE AND CALCIUM CHLORIDE: 600; 310; 30; 20 INJECTION, SOLUTION INTRAVENOUS at 13:14

## 2020-12-08 RX ADMIN — SODIUM CHLORIDE 2 UNITS/HR: 9 INJECTION, SOLUTION INTRAVENOUS at 18:31

## 2020-12-08 RX ADMIN — SODIUM CHLORIDE 9 ML/HR: 900 INJECTION, SOLUTION INTRAVENOUS at 18:55

## 2020-12-08 RX ADMIN — VANCOMYCIN HYDROCHLORIDE 1500 MG: 10 INJECTION, POWDER, LYOPHILIZED, FOR SOLUTION INTRAVENOUS at 11:06

## 2020-12-08 RX ADMIN — DESMOPRESSIN ACETATE 33 MCG: 4 INJECTION INTRAVENOUS at 16:28

## 2020-12-08 RX ADMIN — SODIUM CHLORIDE 40 MCG/MIN: 900 INJECTION, SOLUTION INTRAVENOUS at 13:24

## 2020-12-08 RX ADMIN — POTASSIUM CHLORIDE 20 MEQ: 400 INJECTION, SOLUTION INTRAVENOUS at 19:29

## 2020-12-08 RX ADMIN — MIDAZOLAM HYDROCHLORIDE 2 MG: 1 INJECTION, SOLUTION INTRAMUSCULAR; INTRAVENOUS at 10:45

## 2020-12-08 RX ADMIN — HYDROMORPHONE HYDROCHLORIDE 0.5 MG: 1 INJECTION, SOLUTION INTRAMUSCULAR; INTRAVENOUS; SUBCUTANEOUS at 21:11

## 2020-12-08 RX ADMIN — ROSUVASTATIN 20 MG: 20 TABLET, FILM COATED ORAL at 22:01

## 2020-12-08 RX ADMIN — LIDOCAINE HYDROCHLORIDE 100 MG: 20 INJECTION, SOLUTION INTRAVENOUS at 13:18

## 2020-12-08 RX ADMIN — ACETAMINOPHEN 650 MG: 325 TABLET ORAL at 22:47

## 2020-12-08 RX ADMIN — MUPIROCIN: 20 OINTMENT TOPICAL at 20:05

## 2020-12-08 RX ADMIN — Medication 10 ML: at 22:01

## 2020-12-08 RX ADMIN — FENTANYL CITRATE 50 MCG: 50 INJECTION, SOLUTION INTRAMUSCULAR; INTRAVENOUS at 16:50

## 2020-12-08 RX ADMIN — SODIUM BICARBONATE 50 MEQ: 84 INJECTION, SOLUTION INTRAVENOUS at 21:03

## 2020-12-08 RX ADMIN — SUCCINYLCHOLINE CHLORIDE 160 MG: 20 INJECTION, SOLUTION INTRAMUSCULAR; INTRAVENOUS at 13:18

## 2020-12-08 RX ADMIN — ALBUMIN (HUMAN) 12.5 G: 12.5 INJECTION, SOLUTION INTRAVENOUS at 18:41

## 2020-12-08 RX ADMIN — ROCURONIUM BROMIDE 10 MG: 10 INJECTION INTRAVENOUS at 13:19

## 2020-12-08 RX ADMIN — SUFENTANIL CITRATE 30 MCG: 50 INJECTION EPIDURAL; INTRAVENOUS at 17:29

## 2020-12-08 RX ADMIN — MAGNESIUM SULFATE IN DEXTROSE 2 G: 10 INJECTION, SOLUTION INTRAVENOUS at 16:33

## 2020-12-08 RX ADMIN — FENTANYL CITRATE 50 MCG: 50 INJECTION, SOLUTION INTRAMUSCULAR; INTRAVENOUS at 15:57

## 2020-12-08 RX ADMIN — ALBUMIN (HUMAN) 12.5 G: 12.5 INJECTION, SOLUTION INTRAVENOUS at 23:11

## 2020-12-08 RX ADMIN — ALBUMIN (HUMAN) 12.5 G: 12.5 INJECTION, SOLUTION INTRAVENOUS at 18:58

## 2020-12-08 NOTE — ANESTHESIA PROCEDURE NOTES
DIRK        Procedure Details: probe placement, image aquisition & interpretation    Risks and benefits discussed with the patient and plans are to proceed    Procedure Note    Performed by: Maya Casillas MD  Authorized by:  Maya Casillas MD       Indications: assessment of ascending aorta and assessment of surgical repair  Modalities: 2D, CF, CWD, PWD  Probe Type: multiplane and epiaortic  Insertion: atraumatic  Patient Status: intubated and sedated    Echocardiographic and Doppler Measurements   Aorta  Size  Diam(cm)  Dissection PlaqueThick(mm)  Plaque Mobile    Ascending normal  No 0-3 No    Arch normal  No 0-3 No    Descending normal  No 0-3 No          Valves  Annulus  Stenosis  Area/Grad  Regurg  Leaflet   Morph  Leaflet   Motion    Aortic calcified severe LAURA=0.69, gradients 42/26 1+ calcified, thickened restricted    Mitral normal none  1+ normal normal    Tricuspid normal none  1+            Atria  Size  SEC (smoke)  Thrombus  Tumor  Device    Rt Atrium normal No No No No    Lt Atrium normal No No No No     Interatrial Septum Morphology: normal    Interventricular Septum Morphology: normal    Ventricle  Cavity Size  Cavity Dimension Hypertrophy  Thrombus  Gloal FXN  EF    RV normal  No no normal     LV normal  Yes No normal 55       Regional Function  (1 = normal, 2 = mildly hypokinetic, 3 = severely hypokinetic, 4 = akinetic, 5 = dyskinetic) LAV - Long [place] View   ME LAV = 0  ME LAV = 90  ME LAV = 130   Basal Sept:1 Basal Ant:1 Basal Post:1   Mid Sept:1 Mid Ant:1 Mid Post:1   Apical Sept:1 Apical Ant:1 Basal Ant Sept:1   Basal Lat:1 Basal Inf:1 Mid Ant Sept:1   Mid Lat:1 Mid Inf:1    Apical Lat:1 Apical Inf:1      Diastolic function: normal  Pericardium: normal    Post Intervention Follow-up Study         Valve  Function  Regurgitation  Area    Aortic improved      Mitral no change 1+     Tricuspid no change 1+     Prosthetic normal         Comments: Epiaorticultrasound- mild proximal posterior wall plaque, cannulation and clamp sites normal    Bioprosthetic AVR- normal leaflet function, no paravalvular leaks, gradients 13/7

## 2020-12-08 NOTE — PROGRESS NOTES
Cardiac Surgery Care Coordinator- called the wife of Daisy Donato, introduced role of the Cardiac Surgery Coordinator. Reviewed plan of care and day of surgery expectations. Provided wife with an update from OR. Encouraged her to verbalize and emotional support given. Will continue to update throughout the day.  Jasmeet Rolle RN

## 2020-12-08 NOTE — INTERVAL H&P NOTE
Date of Surgery Update:  Diane Quiñonez was seen and examined. History and physical has been reviewed. The patient has been examined.  There have been no significant clinical changes since the completion of the originally dated History and Physical.    Signed By: JANICE Morales     December 8, 2020 10:39 AM

## 2020-12-08 NOTE — ANESTHESIA PREPROCEDURE EVALUATION
Relevant Problems   No relevant active problems       Anesthetic History   No history of anesthetic complications            Review of Systems / Medical History  Patient summary reviewed, nursing notes reviewed and pertinent labs reviewed    Pulmonary        Sleep apnea: CPAP    Asthma     Comments: Mild bronchiectasis   Neuro/Psych   Within defined limits           Cardiovascular    Hypertension  Valvular problems/murmurs: aortic stenosis            Exercise tolerance: >4 METS     GI/Hepatic/Renal  Within defined limits              Endo/Other        Arthritis     Other Findings              Physical Exam    Airway  Mallampati: III  TM Distance: 4 - 6 cm  Neck ROM: normal range of motion   Mouth opening: Normal     Cardiovascular    Rhythm: regular  Rate: normal    Murmur, Aortic area     Dental  No notable dental hx       Pulmonary  Breath sounds clear to auscultation               Abdominal  GI exam deferred       Other Findings            Anesthetic Plan    ASA: 4  Anesthesia type: general    Monitoring Plan: Arterial line, BIS, CVP, [place] and DIRK    Post procedure ventilation   Induction: Intravenous  Anesthetic plan and risks discussed with: Patient

## 2020-12-08 NOTE — ANESTHESIA PROCEDURE NOTES
Arterial Line Placement    Start time: 12/8/2020 11:43 AM  End time: 12/8/2020 11:49 AM  Performed by: Macey Petersen MD  Authorized by:  Macey Petersen MD     Pre-Procedure  Indications:  Arterial pressure monitoring  Preanesthetic Checklist: patient identified, risks and benefits discussed, anesthesia consent, site marked, patient being monitored, timeout performed and patient being monitored      Procedure:   Prep:  Chlorhexidine  Seldinger Technique?: Yes    Orientation:  Right  Location:  Radial artery  Catheter size:  20 G  Number of attempts:  1  Cont Cardiac Output Sensor: No      Assessment:   Post-procedure:  Line secured and sterile dressing applied  Patient Tolerance:  Patient tolerated the procedure well with no immediate complications

## 2020-12-08 NOTE — BRIEF OP NOTE
BRIEF OP NOTE  Pre-Op Diagnosis: AORTIC STENOSIS    Post-Op Diagnosis: AORTIC STENOSIS      Procedure:  AORTIC VALVE REPLACEMENT (23 mm Inspiris)    Surgeon: Eusebio Velazquez    Assistant(s): JANICE Lau    Anesthesia: General     Infusions: Amiodarone, precedex, nataliia    Estimated Blood Loss: 700 mL    Cell Saver: 750 mL    Specimens:   ID Type Source Tests Collected by Time Destination   1 : aortic valve leaflets Preservative Aortic Valve  Minda Fields MD 12/8/2020 1418 Pathology       Drains and pacing wires: 2 atrial wires, 1 bipolar ventricular wire, 2 melina drains    Complications: None    Findings: AORTIC STENOSIS    Implants:   Implant Name Type Inv.  Item Serial No.  Lot No. LRB No. Used Action   VALVE AORTIC 23MM -- [employer] - [id number]  VALVE AORTIC 23MM -- [employer] [id number] [employer] NA N/A 1 Implanted       JANICE Lau

## 2020-12-08 NOTE — PERIOP NOTES
1009 - PT'S COVID TEST RESULTED NEG. PT STATES HAS QUARANTINED SINCE TESTING. PT DENIES S/S OF COVID - NO FEVER, COLD, COUGH, N/V, DIARRHEA. .... PT DOES HAVE SOB ON EXERTION SECONDARY TO AORTIC STENOSIS. PRE-OP TCHING DONE - PT VERBALIZES UNDERSTANDING.    1120 - TIMEOUT DONE - DR. Dorene Allen AT BEDSIDE TO PLACE JAVI AND CENTRAL LINE. PT LUZ MARINA WELL.  VSS.

## 2020-12-08 NOTE — PROGRESS NOTES
Called the wife of Lewis Hansen II, provided her with update. She is without questions or concerns at this time. Will continue to follow for educational and emotional needs.  Ruthie Guzman RN

## 2020-12-08 NOTE — PERIOP NOTES
1546:  Rewarming call to ccu    1552:  B. [place],RN aware of update to give to patients family. 1700:  Patients wife updated on procedure    1701:  TRANSFER - OUT REPORT:    Verbal report given to LINCOLN Livingston on Alarcon Boast II  being transferred to CCU for routine post - op       Report consisted of patients Situation, Background, Assessment and   Recommendations(SBAR). Information from the following report(s) SBAR, OR Summary, Procedure Summary, Intake/Output and MAR was reviewed with the receiving nurse. Lines:   Double Lumen 12/08/20 Right (Active)       Nancy  12/08/20 Neck (Active)       Quad Lumen 12/08/20 Neck (Active)       Peripheral IV 12/08/20 Left; Inner Wrist (Active)   Site Assessment Clean, dry, & intact 12/08/20 1058   Phlebitis Assessment 0 12/08/20 1058   Infiltration Assessment 0 12/08/20 1058   Dressing Status Clean, dry, & intact 12/08/20 1058   Dressing Type Trach dressing 12/08/20 1058   Hub Color/Line Status Pink 12/08/20 1058       Arterial Line 12/08/20 Right Radial artery (Active)        Opportunity for questions and clarification was provided.       Patient transported with:   Monitor  O2 @ 12 liters  Registered Nurse  Tech   CRNA

## 2020-12-08 NOTE — ANESTHESIA PROCEDURE NOTES
Central Line and Pulmonary Artery Catheter Placement    Start time: 12/8/2020 11:22 AM  End time: 12/8/2020 11:37 AM  Performed by: Reinaldo Allen MD  Authorized by: Reinaldo Allen MD     Indications: vascular access  Preanesthetic Checklist: patient identified, risks and benefits discussed, anesthesia consent, site marked, patient being monitored and timeout performed      Pre-procedure: All elements of maximal sterile barrier technique followed?  Yes    2% Chlorhexidine for cutaneous antisepsis, Hand hygiene performed prior to catheter insertion and Ultrasound guidance    Sterile Ultrasound Technique followed?: Yes            Procedure:   Prep:  Chlorhexidine  Location: internal jugular  Orientation:  Right  Patient position:  Trendelenburg  Catheter type:  Double lumen  Catheter size:  9 Fr  Catheter length:  12 cm  Number of attempts:  1  Successful placement: Yes      Assessment:   Post-procedure:  Catheter secured and sterile dressing with CHG applied  Assessment:  Blood return through all ports  Insertion:  Uncomplicated  Patient tolerance:  Patient tolerated the procedure well with no immediate complications  9 Fr MAC and 8 Fr CCO PA Catheter

## 2020-12-09 ENCOUNTER — APPOINTMENT (OUTPATIENT)
Dept: GENERAL RADIOLOGY | Age: 60
DRG: 220 | End: 2020-12-09
Attending: PHYSICIAN ASSISTANT
Payer: COMMERCIAL

## 2020-12-09 LAB
ADMINISTERED INITIALS, ADMINIT: NORMAL
ALBUMIN SERPL-MCNC: 3.9 G/DL (ref 3.5–5)
ALBUMIN/GLOB SERPL: 2.1 {RATIO} (ref 1.1–2.2)
ALP SERPL-CCNC: 41 U/L (ref 45–117)
ALT SERPL-CCNC: 30 U/L (ref 12–78)
ANION GAP SERPL CALC-SCNC: 3 MMOL/L (ref 5–15)
AST SERPL-CCNC: 36 U/L (ref 15–37)
BILIRUB SERPL-MCNC: 0.6 MG/DL (ref 0.2–1)
BUN SERPL-MCNC: 17 MG/DL (ref 6–20)
BUN/CREAT SERPL: 23 (ref 12–20)
CALCIUM SERPL-MCNC: 8.3 MG/DL (ref 8.5–10.1)
CHLORIDE SERPL-SCNC: 114 MMOL/L (ref 97–108)
CO2 SERPL-SCNC: 25 MMOL/L (ref 21–32)
CREAT SERPL-MCNC: 0.74 MG/DL (ref 0.7–1.3)
D50 ADMINISTERED, D50ADM: 0 ML
D50 ORDER, D50ORD: 0 ML
ERYTHROCYTE [DISTWIDTH] IN BLOOD BY AUTOMATED COUNT: 13.5 % (ref 11.5–14.5)
GLOBULIN SER CALC-MCNC: 1.9 G/DL (ref 2–4)
GLSCOM COMMENTS: NORMAL
GLUCOSE BLD STRIP.AUTO-MCNC: 100 MG/DL (ref 65–100)
GLUCOSE BLD STRIP.AUTO-MCNC: 105 MG/DL (ref 65–100)
GLUCOSE BLD STRIP.AUTO-MCNC: 108 MG/DL (ref 65–100)
GLUCOSE BLD STRIP.AUTO-MCNC: 110 MG/DL (ref 65–100)
GLUCOSE BLD STRIP.AUTO-MCNC: 117 MG/DL (ref 65–100)
GLUCOSE BLD STRIP.AUTO-MCNC: 118 MG/DL (ref 65–100)
GLUCOSE BLD STRIP.AUTO-MCNC: 119 MG/DL (ref 65–100)
GLUCOSE BLD STRIP.AUTO-MCNC: 131 MG/DL (ref 65–100)
GLUCOSE BLD STRIP.AUTO-MCNC: 142 MG/DL (ref 65–100)
GLUCOSE BLD STRIP.AUTO-MCNC: 152 MG/DL (ref 65–100)
GLUCOSE BLD STRIP.AUTO-MCNC: 80 MG/DL (ref 65–100)
GLUCOSE BLD STRIP.AUTO-MCNC: 90 MG/DL (ref 65–100)
GLUCOSE BLD STRIP.AUTO-MCNC: 90 MG/DL (ref 65–100)
GLUCOSE BLD STRIP.AUTO-MCNC: 92 MG/DL (ref 65–100)
GLUCOSE BLD STRIP.AUTO-MCNC: 93 MG/DL (ref 65–100)
GLUCOSE BLD STRIP.AUTO-MCNC: 97 MG/DL (ref 65–100)
GLUCOSE BLD STRIP.AUTO-MCNC: 97 MG/DL (ref 65–100)
GLUCOSE BLD STRIP.AUTO-MCNC: 99 MG/DL (ref 65–100)
GLUCOSE SERPL-MCNC: 98 MG/DL (ref 65–100)
GLUCOSE, GLC: 100 MG/DL
GLUCOSE, GLC: 105 MG/DL
GLUCOSE, GLC: 108 MG/DL
GLUCOSE, GLC: 110 MG/DL
GLUCOSE, GLC: 117 MG/DL
GLUCOSE, GLC: 118 MG/DL
GLUCOSE, GLC: 119 MG/DL
GLUCOSE, GLC: 131 MG/DL
GLUCOSE, GLC: 142 MG/DL
GLUCOSE, GLC: 152 MG/DL
GLUCOSE, GLC: 82 MG/DL
GLUCOSE, GLC: 90 MG/DL
GLUCOSE, GLC: 90 MG/DL
GLUCOSE, GLC: 92 MG/DL
GLUCOSE, GLC: 93 MG/DL
GLUCOSE, GLC: 97 MG/DL
GLUCOSE, GLC: 97 MG/DL
GLUCOSE, GLC: 99 MG/DL
HCT VFR BLD AUTO: 30.1 % (ref 36.6–50.3)
HGB BLD-MCNC: 9.7 G/DL (ref 12.1–17)
HIGH TARGET, HITG: 130 MG/DL
INSULIN ADMINSTERED, INSADM: 0 UNITS/HOUR
INSULIN ADMINSTERED, INSADM: 0.4 UNITS/HOUR
INSULIN ADMINSTERED, INSADM: 0.4 UNITS/HOUR
INSULIN ADMINSTERED, INSADM: 0.9 UNITS/HOUR
INSULIN ADMINSTERED, INSADM: 0.9 UNITS/HOUR
INSULIN ADMINSTERED, INSADM: 1 UNITS/HOUR
INSULIN ADMINSTERED, INSADM: 1.1 UNITS/HOUR
INSULIN ADMINSTERED, INSADM: 1.2 UNITS/HOUR
INSULIN ADMINSTERED, INSADM: 1.4 UNITS/HOUR
INSULIN ADMINSTERED, INSADM: 1.5 UNITS/HOUR
INSULIN ADMINSTERED, INSADM: 1.8 UNITS/HOUR
INSULIN ADMINSTERED, INSADM: 2.5 UNITS/HOUR
INSULIN ORDER, INSORD: 0 UNITS/HOUR
INSULIN ORDER, INSORD: 0.3 UNITS/HOUR
INSULIN ORDER, INSORD: 0.4 UNITS/HOUR
INSULIN ORDER, INSORD: 0.4 UNITS/HOUR
INSULIN ORDER, INSORD: 0.9 UNITS/HOUR
INSULIN ORDER, INSORD: 0.9 UNITS/HOUR
INSULIN ORDER, INSORD: 1 UNITS/HOUR
INSULIN ORDER, INSORD: 1.1 UNITS/HOUR
INSULIN ORDER, INSORD: 1.2 UNITS/HOUR
INSULIN ORDER, INSORD: 1.4 UNITS/HOUR
INSULIN ORDER, INSORD: 1.5 UNITS/HOUR
INSULIN ORDER, INSORD: 1.8 UNITS/HOUR
INSULIN ORDER, INSORD: 2.5 UNITS/HOUR
LOW TARGET, LOT: 95 MG/DL
MAGNESIUM SERPL-MCNC: 2.5 MG/DL (ref 1.6–2.4)
MCH RBC QN AUTO: 28.4 PG (ref 26–34)
MCHC RBC AUTO-ENTMCNC: 32.2 G/DL (ref 30–36.5)
MCV RBC AUTO: 88.3 FL (ref 80–99)
MINUTES UNTIL NEXT BG, NBG: 60 MIN
MULTIPLIER, MUL: 0
MULTIPLIER, MUL: 0.01
MULTIPLIER, MUL: 0.02
MULTIPLIER, MUL: 0.03
MULTIPLIER, MUL: 0.04
NRBC # BLD: 0 K/UL (ref 0–0.01)
NRBC BLD-RTO: 0 PER 100 WBC
ORDER INITIALS, ORDINIT: NORMAL
PLATELET # BLD AUTO: 109 K/UL (ref 150–400)
PMV BLD AUTO: 11.1 FL (ref 8.9–12.9)
POTASSIUM SERPL-SCNC: 3.9 MMOL/L (ref 3.5–5.1)
PROT SERPL-MCNC: 5.8 G/DL (ref 6.4–8.2)
RBC # BLD AUTO: 3.41 M/UL (ref 4.1–5.7)
SERVICE CMNT-IMP: ABNORMAL
SERVICE CMNT-IMP: NORMAL
SODIUM SERPL-SCNC: 142 MMOL/L (ref 136–145)
WBC # BLD AUTO: 9.6 K/UL (ref 4.1–11.1)

## 2020-12-09 PROCEDURE — P9045 ALBUMIN (HUMAN), 5%, 250 ML: HCPCS

## 2020-12-09 PROCEDURE — 97165 OT EVAL LOW COMPLEX 30 MIN: CPT | Performed by: OCCUPATIONAL THERAPIST

## 2020-12-09 PROCEDURE — 97530 THERAPEUTIC ACTIVITIES: CPT | Performed by: OCCUPATIONAL THERAPIST

## 2020-12-09 PROCEDURE — 77030041076 HC DRSG AG OPTICELL MDII -A

## 2020-12-09 PROCEDURE — 77010033678 HC OXYGEN DAILY

## 2020-12-09 PROCEDURE — P9045 ALBUMIN (HUMAN), 5%, 250 ML: HCPCS | Performed by: THORACIC SURGERY (CARDIOTHORACIC VASCULAR SURGERY)

## 2020-12-09 PROCEDURE — 71045 X-RAY EXAM CHEST 1 VIEW: CPT

## 2020-12-09 PROCEDURE — 74011250637 HC RX REV CODE- 250/637: Performed by: PHYSICIAN ASSISTANT

## 2020-12-09 PROCEDURE — 36415 COLL VENOUS BLD VENIPUNCTURE: CPT

## 2020-12-09 PROCEDURE — 83735 ASSAY OF MAGNESIUM: CPT

## 2020-12-09 PROCEDURE — 2709999900 HC NON-CHARGEABLE SUPPLY

## 2020-12-09 PROCEDURE — 74011000250 HC RX REV CODE- 250: Performed by: PHYSICIAN ASSISTANT

## 2020-12-09 PROCEDURE — 97116 GAIT TRAINING THERAPY: CPT

## 2020-12-09 PROCEDURE — 85027 COMPLETE CBC AUTOMATED: CPT

## 2020-12-09 PROCEDURE — 74011250636 HC RX REV CODE- 250/636

## 2020-12-09 PROCEDURE — 82962 GLUCOSE BLOOD TEST: CPT

## 2020-12-09 PROCEDURE — 74011250636 HC RX REV CODE- 250/636: Performed by: PHYSICIAN ASSISTANT

## 2020-12-09 PROCEDURE — 97162 PT EVAL MOD COMPLEX 30 MIN: CPT

## 2020-12-09 PROCEDURE — 74011250636 HC RX REV CODE- 250/636: Performed by: THORACIC SURGERY (CARDIOTHORACIC VASCULAR SURGERY)

## 2020-12-09 PROCEDURE — 65660000000 HC RM CCU STEPDOWN

## 2020-12-09 PROCEDURE — 80053 COMPREHEN METABOLIC PANEL: CPT

## 2020-12-09 PROCEDURE — 97110 THERAPEUTIC EXERCISES: CPT

## 2020-12-09 RX ORDER — CETIRIZINE HCL 10 MG
10 TABLET ORAL DAILY
Status: DISCONTINUED | OUTPATIENT
Start: 2020-12-09 | End: 2020-12-12 | Stop reason: HOSPADM

## 2020-12-09 RX ORDER — METHYLPHENIDATE HYDROCHLORIDE 5 MG/1
20 TABLET ORAL 4 TIMES DAILY
Status: DISCONTINUED | OUTPATIENT
Start: 2020-12-09 | End: 2020-12-10

## 2020-12-09 RX ORDER — POTASSIUM CHLORIDE 29.8 MG/ML
20 INJECTION INTRAVENOUS ONCE
Status: ACTIVE | OUTPATIENT
Start: 2020-12-09 | End: 2020-12-09

## 2020-12-09 RX ORDER — ENOXAPARIN SODIUM 100 MG/ML
40 INJECTION SUBCUTANEOUS EVERY 24 HOURS
Status: DISCONTINUED | OUTPATIENT
Start: 2020-12-09 | End: 2020-12-12 | Stop reason: HOSPADM

## 2020-12-09 RX ORDER — ALBUMIN HUMAN 50 G/1000ML
SOLUTION INTRAVENOUS
Status: COMPLETED
Start: 2020-12-09 | End: 2020-12-09

## 2020-12-09 RX ORDER — ALBUMIN HUMAN 50 G/1000ML
12.5 SOLUTION INTRAVENOUS AS NEEDED
Status: DISCONTINUED | OUTPATIENT
Start: 2020-12-09 | End: 2020-12-10

## 2020-12-09 RX ORDER — THERA TABS 400 MCG
1 TAB ORAL DAILY
Status: DISCONTINUED | OUTPATIENT
Start: 2020-12-09 | End: 2020-12-12 | Stop reason: HOSPADM

## 2020-12-09 RX ORDER — MONTELUKAST SODIUM 10 MG/1
10 TABLET ORAL DAILY
Status: DISCONTINUED | OUTPATIENT
Start: 2020-12-09 | End: 2020-12-12 | Stop reason: HOSPADM

## 2020-12-09 RX ORDER — CLOPIDOGREL BISULFATE 75 MG/1
75 TABLET ORAL DAILY
Status: DISCONTINUED | OUTPATIENT
Start: 2020-12-10 | End: 2020-12-12 | Stop reason: HOSPADM

## 2020-12-09 RX ORDER — MELATONIN
5000 DAILY
Status: DISCONTINUED | OUTPATIENT
Start: 2020-12-09 | End: 2020-12-12 | Stop reason: HOSPADM

## 2020-12-09 RX ORDER — TAMSULOSIN HYDROCHLORIDE 0.4 MG/1
0.4 CAPSULE ORAL DAILY
Status: DISCONTINUED | OUTPATIENT
Start: 2020-12-10 | End: 2020-12-12 | Stop reason: HOSPADM

## 2020-12-09 RX ORDER — PANTOPRAZOLE SODIUM 40 MG/1
40 TABLET, DELAYED RELEASE ORAL
Status: DISCONTINUED | OUTPATIENT
Start: 2020-12-10 | End: 2020-12-12 | Stop reason: HOSPADM

## 2020-12-09 RX ORDER — ONDANSETRON 2 MG/ML
8 INJECTION INTRAMUSCULAR; INTRAVENOUS
Status: DISCONTINUED | OUTPATIENT
Start: 2020-12-09 | End: 2020-12-12 | Stop reason: HOSPADM

## 2020-12-09 RX ADMIN — Medication 10 ML: at 15:13

## 2020-12-09 RX ADMIN — VANCOMYCIN HYDROCHLORIDE 1500 MG: 10 INJECTION, POWDER, LYOPHILIZED, FOR SOLUTION INTRAVENOUS at 21:26

## 2020-12-09 RX ADMIN — PRAMIPEXOLE DIHYDROCHLORIDE 0.12 MG: 0.25 TABLET ORAL at 13:10

## 2020-12-09 RX ADMIN — ACETAMINOPHEN 650 MG: 325 TABLET ORAL at 22:16

## 2020-12-09 RX ADMIN — ONDANSETRON 8 MG: 2 INJECTION INTRAMUSCULAR; INTRAVENOUS at 13:37

## 2020-12-09 RX ADMIN — ACETAMINOPHEN 650 MG: 325 TABLET ORAL at 08:49

## 2020-12-09 RX ADMIN — THERA TABS 1 TABLET: TAB at 13:11

## 2020-12-09 RX ADMIN — MONTELUKAST SODIUM 10 MG: 10 TABLET ORAL at 13:10

## 2020-12-09 RX ADMIN — ONDANSETRON 4 MG: 2 INJECTION INTRAMUSCULAR; INTRAVENOUS at 06:09

## 2020-12-09 RX ADMIN — HYDROMORPHONE HYDROCHLORIDE 1 MG: 1 INJECTION, SOLUTION INTRAMUSCULAR; INTRAVENOUS; SUBCUTANEOUS at 06:04

## 2020-12-09 RX ADMIN — HYDROMORPHONE HYDROCHLORIDE 1 MG: 1 INJECTION, SOLUTION INTRAMUSCULAR; INTRAVENOUS; SUBCUTANEOUS at 23:16

## 2020-12-09 RX ADMIN — KETOROLAC TROMETHAMINE 15 MG: 30 INJECTION, SOLUTION INTRAMUSCULAR at 18:00

## 2020-12-09 RX ADMIN — ONDANSETRON 8 MG: 2 INJECTION INTRAMUSCULAR; INTRAVENOUS at 08:50

## 2020-12-09 RX ADMIN — CALCIUM CHLORIDE 1 G: 100 INJECTION, SOLUTION INTRAVENOUS at 08:48

## 2020-12-09 RX ADMIN — CHLORHEXIDINE GLUCONATE 0.12% ORAL RINSE 10 ML: 1.2 LIQUID ORAL at 21:06

## 2020-12-09 RX ADMIN — ASPIRIN 81 MG CHEWABLE TABLET 81 MG: 81 TABLET CHEWABLE at 08:49

## 2020-12-09 RX ADMIN — KETOROLAC TROMETHAMINE 15 MG: 30 INJECTION, SOLUTION INTRAMUSCULAR at 05:20

## 2020-12-09 RX ADMIN — KETOROLAC TROMETHAMINE 15 MG: 30 INJECTION, SOLUTION INTRAMUSCULAR at 00:15

## 2020-12-09 RX ADMIN — ACETAMINOPHEN 650 MG: 325 TABLET ORAL at 13:10

## 2020-12-09 RX ADMIN — ALBUMIN (HUMAN) 12.5 G: 12.5 INJECTION, SOLUTION INTRAVENOUS at 06:33

## 2020-12-09 RX ADMIN — CALCIUM CHLORIDE 1 G: 100 INJECTION, SOLUTION INTRAVENOUS at 01:08

## 2020-12-09 RX ADMIN — ENOXAPARIN SODIUM 40 MG: 40 INJECTION SUBCUTANEOUS at 13:11

## 2020-12-09 RX ADMIN — MUPIROCIN: 20 OINTMENT TOPICAL at 21:06

## 2020-12-09 RX ADMIN — ALBUMIN (HUMAN) 12.5 G: 12.5 INJECTION, SOLUTION INTRAVENOUS at 10:18

## 2020-12-09 RX ADMIN — CETIRIZINE HYDROCHLORIDE 10 MG: 10 TABLET, FILM COATED ORAL at 13:11

## 2020-12-09 RX ADMIN — PRAMIPEXOLE DIHYDROCHLORIDE 0.5 MG: 1 TABLET ORAL at 21:05

## 2020-12-09 RX ADMIN — CHLORHEXIDINE GLUCONATE 0.12% ORAL RINSE 10 ML: 1.2 LIQUID ORAL at 08:50

## 2020-12-09 RX ADMIN — POTASSIUM CHLORIDE 20 MEQ: 400 INJECTION, SOLUTION INTRAVENOUS at 05:20

## 2020-12-09 RX ADMIN — OXYCODONE HYDROCHLORIDE 10 MG: 5 TABLET ORAL at 20:47

## 2020-12-09 RX ADMIN — FAMOTIDINE 20 MG: 10 INJECTION INTRAVENOUS at 08:49

## 2020-12-09 RX ADMIN — POLYETHYLENE GLYCOL 3350 17 G: 17 POWDER, FOR SOLUTION ORAL at 08:50

## 2020-12-09 RX ADMIN — MUPIROCIN: 20 OINTMENT TOPICAL at 08:54

## 2020-12-09 RX ADMIN — OXYCODONE HYDROCHLORIDE 10 MG: 5 TABLET ORAL at 08:49

## 2020-12-09 RX ADMIN — ALBUMIN (HUMAN) 12.5 G: 12.5 INJECTION, SOLUTION INTRAVENOUS at 01:10

## 2020-12-09 RX ADMIN — METHYLPHENIDATE HYDROCHLORIDE 20 MG: 5 TABLET ORAL at 15:44

## 2020-12-09 RX ADMIN — ROSUVASTATIN 20 MG: 20 TABLET, FILM COATED ORAL at 21:06

## 2020-12-09 RX ADMIN — DOCUSATE SODIUM - SENNOSIDES 1 TABLET: 50; 8.6 TABLET, FILM COATED ORAL at 08:49

## 2020-12-09 RX ADMIN — Medication 10 ML: at 05:21

## 2020-12-09 RX ADMIN — VANCOMYCIN HYDROCHLORIDE 1500 MG: 10 INJECTION, POWDER, LYOPHILIZED, FOR SOLUTION INTRAVENOUS at 09:00

## 2020-12-09 RX ADMIN — Medication 10 ML: at 21:26

## 2020-12-09 RX ADMIN — Medication 5 TABLET: at 11:00

## 2020-12-09 RX ADMIN — ACETAMINOPHEN 650 MG: 325 TABLET ORAL at 03:13

## 2020-12-09 RX ADMIN — KETOROLAC TROMETHAMINE 15 MG: 30 INJECTION, SOLUTION INTRAMUSCULAR at 13:11

## 2020-12-09 NOTE — PROGRESS NOTES
12/09/20 1041 12/09/20 1045 12/09/20 1047   Vital Signs   Pulse (Heart Rate) (!) 57 64 70   Heart Rate Source Monitor Monitor Monitor   /62 125/68 125/60   MAP (Calculated) 84 87 82   BP 1 Location Left arm Left arm Left arm   BP 1 Method Automatic Automatic Automatic   BP Patient Position Supine;Pre-activity Sitting Standing   O2 Sat (%) 100 %  --   --    O2 Device Nasal cannula  --   --    O2 Flow Rate (L/min) 3 l/min  --   --       12/09/20 1057   Vital Signs   Pulse (Heart Rate) 62   Heart Rate Source Monitor   BP (!) 139/58   MAP (Calculated) 85   BP 1 Location Left arm   BP 1 Method Automatic   BP Patient Position Sitting;Post activity   O2 Sat (%) 98 %   O2 Device Nasal cannula   O2 Flow Rate (L/min) 3 l/min

## 2020-12-09 NOTE — PROGRESS NOTES
1750  Patient arrived into CCU from OR with OR team, Dr Archie Etienne , and Ramiro CAMACHO. Patient AVR with precedex, NS gtts infusing. Report received from CRNA at the bedside. Chest tube drainage so far 74cc. Labs, chest x-ray, EKG, and assessment completed. 1900  BP very labile. Pt on and off nataliia and cardene. Albumin and NS infusing. 0  Spoke with pt's wife and son via phone for update. Discussed pt with RT. Pt with bad sleep apnea and uses CPAP at home even for naps. Requested family bring in home CPAP tonight for use after extubation. 1  Spoke with Dr. Archie Etienne regarding labile BP.   Order received to give albumin as needed, add toradol for pain and keep SBP <130

## 2020-12-09 NOTE — PROGRESS NOTES
Cardiac Surgery Care Coordinator-  Met with Sumi Bingham Reviewed plan of care and discussed goals for the day. Sumi Bingham has a good understanding of his plan for the day. Reinforced sternal precautions and encouraged continued use of the incentive spirometer. Eliud Cluck II can pull 2000ml. Encouraged Eliud Cluck II to verbalize. Will continue to follow for educational and emotional needs.  Abhishek Garcia RN

## 2020-12-09 NOTE — PROGRESS NOTES
Problem: Self Care Deficits Care Plan (Adult)  Goal: *Acute Goals and Plan of Care (Insert Text)  Description: FUNCTIONAL STATUS PRIOR TO ADMISSION: Patient was independent and active without use of DME.    HOME SUPPORT PRIOR TO ADMISSION: The patient lived with wife who can provide assist as needed. Occupational Therapy Goals:  Initiated 12/9/2020  1. Patient will perform grooming standing with item retrieval with modified independence within 7 days. 2. Patient will perform upper body dressing and lower body dressing with modified independence within 7 days. 3. Patient will perform toileting with modified independence within 7 days. 4. Patient will be independent with cardiac home exercise program within 7 days. 5. Patient will transfer from toilet with modified independence using the least restrictive device and appropriate durable medical equipment within 7 days. Outcome: Not Met   OCCUPATIONAL THERAPY EVALUATION  Patient: Olga Aguilera II (79 y.o. male)  Date: 12/9/2020  Primary Diagnosis: Aortic stenosis, severe [I35.0]  Aortic stenosis [I35.0]  Procedure(s) (LRB):  AORTIC VALVE REPLACEMENT WITH DELNIDO (N/A) 1 Day Post-Op   Precautions:   Sternal(Move in the tube)    ASSESSMENT  Based on the objective data described below, the patient presents with report of slight dizziness with OOB activity but all vitals were stable. Pt is very active at baseline. CGA to min assist for mobility and balance today. Pt was educated on precautions and needs cues during functional mobility. Limited ADL assessment due to lines and leads as well as nausea. Pt had two episodes of vomiting during session and nursing aware. Patient is verbalizing and is demonstrating understanding of mindful-based movements (\"move in the tube\") principles of keeping UEs proximal to ribcage to prevent lateral pull on the sternum during load-bearing activities with visual and verbal cues required for compliance. Current Level of Function Impacting Discharge (ADLs/self-care): CGA to min assist for mobility  Feeding: Setup    Oral Facial Hygiene/Grooming: Setup    Bathing: Moderate assistance    Upper Body Dressing: Moderate assistance    Lower Body Dressing: Moderate assistance    Toileting: Moderate assistance       Functional Outcome Measure: The patient scored 55/100 on the barthel outcome measure which is indicative of moderate decline in mobility and ADLS. Other factors to consider for discharge: none     Patient will benefit from skilled therapy intervention to address the above noted impairments. PLAN :  Recommendations and Planned Interventions: self care training, functional mobility training, therapeutic exercise, balance training, therapeutic activities, patient education, home safety training and family training/education    Frequency/Duration: Patient will be followed by occupational therapy 5 times a week to address goals. Recommendation for discharge: (in order for the patient to meet his/her long term goals)  Occupational therapy at least 2 days/week in the home     This discharge recommendation:  Has been made in collaboration with the attending provider and/or case management    IF patient discharges home will need the following DME: none       SUBJECTIVE:   Patient stated I feel nauseous.     OBJECTIVE DATA SUMMARY:   HISTORY:   Past Medical History:   Diagnosis Date    Adverse effect of anesthesia     took long time to wake up    Arthritis     hands    Asthma     Hypercholesterolemia     Hypertension     Ill-defined condition     \"Idiopathic hypersomnia\"    Ill-defined condition     restless leg syndrome    Ill-defined condition     SEVERE AORTIC STENOSIS    Lumbar herniated disc     Sleep apnea     use cpap     Past Surgical History:   Procedure Laterality Date    HX BACK SURGERY  2016    lumbar 4-5    HX HEART CATHETERIZATION  2020    no stents    HX TONSILLECTOMY as a child    IR BRONCHOSCOPY  2020    and with pneumonia as a child       Expanded or extensive additional review of patient history:     Home Situation  Home Environment: Private residence  # Steps to Enter: 5  Rails to Enter: Yes  Hand Rails : Bilateral  One/Two Story Residence: Two story  # of Interior Steps: 13  Interior Rails: Right  Lift Chair Available: No  Living Alone: No  Support Systems: Spouse/Significant Other/Partner  Current DME Used/Available at Home: CPAP    Hand dominance: Right    EXAMINATION OF PERFORMANCE DEFICITS:  Cognitive/Behavioral Status:  Neurologic State: Alert  Orientation Level: Oriented X4  Cognition: Appropriate decision making; Appropriate for age attention/concentration; Follows commands  Perception: Appears intact  Perseveration: No perseveration noted  Safety/Judgement: Awareness of environment; Fall prevention;Home safety        Hearing: Auditory  Auditory Impairment: None  Hearing Aids/Status: Does not own    Vision/Perceptual:                           Acuity: Impaired near vision; Impaired far vision(tri focals)    Corrective Lenses: Glasses    Range of Motion:    AROM: Generally decreased, functional  PROM: Within functional limits                      Strength:    Strength: Generally decreased, functional                Coordination:  Coordination: Within functional limits  Fine Motor Skills-Upper: Left Intact; Right Intact    Gross Motor Skills-Upper: Left Intact; Right Intact    Tone & Sensation:    Tone: Normal  Sensation: Intact                      Balance:  Sitting: Intact    Functional Mobility and Transfers for ADLs:  Bed Mobility:  Rolling: Stand-by assistance  Supine to Sit: Stand-by assistance  Scooting: Contact guard assistance    Transfers:  Sit to Stand: Contact guard assistance; Additional time  Stand to Sit: Minimum assistance  Bed to Chair: Contact guard assistance; Additional time;Assist x1;Minimum assistance  Bathroom Mobility: Contact guard assistance;Minimum assistance  Toilet Transfer : Contact guard assistance;Minimum assistance    ADL Assessment:  Feeding: Setup    Oral Facial Hygiene/Grooming: Setup    Bathing: Moderate assistance    Upper Body Dressing: Moderate assistance    Lower Body Dressing: Moderate assistance    Toileting: Moderate assistance                ADL Intervention and task modifications:       Cognitive Retraining  Safety/Judgement: Awareness of environment; Fall prevention;Home safety    Patient instructed and educated on mindful movement principles based on Move in The Tube concept to include maintaining bilateral elbows close to rib cage when performing any load-bearing activity such as getting in/out of bed, pushing up from a chair, opening a door, or lifting a box. Patient was given a handout with diagrams of each correct/incorrect method of performing each of the above tasks. Patient instructed on the ability to utilize upper extremities outside the tube when doing any non-load bearing activity such as washing hair/body, brushing teeth, retrieving clothing items, or scratching your back. Patient encouraged to also perform upper extremity exercises \"outside of the tube\" to prevent scar tissue formation around sternal incision site. Patient instructed in detail about activities to heed with caution, allowing pain to be the guide. These activities include but are not limited to: mowing the lawn, riding a bike, walking a dog, lifting a child, workshop hobbies, golfing, sexual activity, vacuuming, fishing, scrubbing the floors, and moving furniture. Patient was given the [address] [facility] the [place] handout to describe each of these activities in detail. Patient instructed no asymmetrical reaching over head to ensure B UEs when shoulders >90* i.e. reaching in cabinets and dressing. Instruction on upper body dressing techniques of over head, then arms through to decrease pain and unilateral shoulder flexion >90*.  Instruction on the benefits of utilizing B UEs during functional tasks i.e. opening the fridge, stepping into the tub. Instruction if continued pain at home with shoulder IR for BM hygiene can use wet wipes and toilet tongs PRN. Avoid valsalva maneuvers. Therapeutic Exercises:   Patient instructed on the benefits and demonstrated cardiac exercises while seated with Stand-by assistance. Instructed and indicated understanding on how to progress reps, sets against gravity, pacing through progressive muscle strengthening standing based on surgeon clearance for more weight in prep for basic and instrumental ADLs. Instruction on the use of household items in place of weights as needed. CARDIAC   EXERCISE    Sets    Reps    Active  Active Assist    Passive  Self ROM    Comments    Shoulder flexion  1  5   [x]                            []                             []                             []                                Shoulder abduction  1  5  [x]                             []                             []                             []                                Scapular elevation  1  5  [x]                             []                              []                             []                                Scapular retraction  1  5  [x]                             []                             []                             []                                    []                             []                             []                                    []                              []                             []                                        Functional Measure:  Barthel Index:    Bathin  Bladder: 0  Bowels: 10  Groomin  Dressin  Feeding: 10  Mobility: 10  Stairs: 0  Toilet Use: 5  Transfer (Bed to Chair and Back): 10  Total: 55/100        The Barthel ADL Index: Guidelines  1.  The index should be used as a record of what a patient does, not as a record of what a patient could do. 2. The main aim is to establish degree of independence from any help, physical or verbal, however minor and for whatever reason. 3. The need for supervision renders the patient not independent. 4. A patient's performance should be established using the best available evidence. Asking the patient, friends/relatives and nurses are the usual sources, but direct observation and common sense are also important. However direct testing is not needed. 5. Usually the patient's performance over the preceding 24-48 hours is important, but occasionally longer periods will be relevant. 6. Middle categories imply that the patient supplies over 50 per cent of the effort. 7. Use of aids to be independent is allowed. Alice Angel., Barthel, D.W. ([id number]). Functional evaluation: the Barthel Index. [address] [facility] (14)2. Krishna [place] alphonse GODWIN Parikh, Bhavana More., Grant Agustin., [place], [address] (1999). Measuring the change indisability after inpatient rehabilitation; comparison of the responsiveness of the Barthel Index and Functional [place] Measure. Journal of Neurology, Neurosurgery, and Psychiatry, 66(4), 786-926. Michael Medeiros, N.J.A, Anil Rene,  W.J.LIZBETH, & John Osorio M.A. (2004.) Assessment of post-stroke quality of life in cost-effectiveness studies: The usefulness of the Barthel Index and the EuroQoL-5D.  Quality of Life Research, 15, 500-71         Occupational Therapy Evaluation Charge Determination   History Examination Decision-Making   LOW Complexity : Brief history review  LOW Complexity : 1-3 performance deficits relating to physical, cognitive , or psychosocial skils that result in activity limitations and / or participation restrictions  LOW Complexity : No comorbidities that affect functional and no verbal or physical assistance needed to complete eval tasks       Based on the above components, the patient evaluation is determined to be of the following complexity level: LOW     Activity Tolerance:   Fair    After treatment patient left in no apparent distress:    Sitting in chair and Call bell within reach    COMMUNICATION/EDUCATION:   The patients plan of care was discussed with: Physical therapist, Registered nurse and patient. Home safety education was provided and the patient/caregiver indicated understanding., Patient/family have participated as able in goal setting and plan of care. and Patient/family agree to work toward stated goals and plan of care. This patients plan of care is appropriate for delegation to [facility].     Thank you for this referral.  Tj Mojica OTR/L  Time Calculation: 30 mins

## 2020-12-09 NOTE — PROGRESS NOTES
Problem: Mobility Impaired (Adult and Pediatric)  Goal: *Acute Goals and Plan of Care (Insert Text)  Description: FUNCTIONAL STATUS PRIOR TO ADMISSION: Patient was independent and active without use of DME.     HOME SUPPORT PRIOR TO ADMISSION: The patient lived with his wife but did not require assist.    Physical Therapy Goals  Initiated 12/9/2020  1. Patient will move from supine to sit and sit to supine , scoot up and down, and roll side to side in bed with independence within 5 days. 2.  Patient will perform sit to/from stand with independence within 5 days. 3.  Patient will ambulate 250 feet with least restrictive assistive device and independence within 5 days. 4.  Patient will ascend/descend 5 stairs with one sided handrail(s) with modified independence within 5 days. 5.  Patient will perform cardiac exercises per protocol with modified independence within 5 days. 6.  Patient will verbally recall and functionally demonstrate mindful-based movements (\"move in the tube\") principles without cues within 5 days. Outcome: Not Met   PHYSICAL THERAPY EVALUATION  Patient: Marguerite Ramos II (80 y.o. male)  Date: 12/9/2020  Primary Diagnosis: Aortic stenosis, severe [I35.0]  Aortic stenosis [I35.0]  Procedure(s) (LRB):  AORTIC VALVE REPLACEMENT WITH DELNIDO (N/A) 1 Day Post-Op   Precautions:   Sternal(Move in the tube)      ASSESSMENT  Based on the objective data described below, the patient presents with decreased strength, decreased functional mobility, impaired balance, and mildly unsteady gait s/p AVR POD 1. Patient is functioning below his baseline due to pain and weakness. Patient educated on sternal precautions and patient very cautious and adheres to precautions well with functional mobility. He required overall CGA for all mobility, ambulating out to the hallway with cardiac cart. Anticipate patient will progress well with therapy.  VSS on 2L O2 although patient became very nauseous and vomiting at end of session. Patient was educated on understanding of mindful-based movements (\"move in the tube\") principles of keeping UEs proximal to ribcage to prevent lateral pull on the sternum during load-bearing activities with visual and verbal cues required for compliance. Current Level of Function Impacting Discharge (mobility/balance): CGA with cart    Functional Outcome Measure: The patient scored 55/100 on the Barthel outcome measure which is indicative of moderate functional impairment. Other factors to consider for discharge: pain, AVR, very active     Patient will benefit from skilled therapy intervention to address the above noted impairments. PLAN :  Recommendations and Planned Interventions: bed mobility training, transfer training, gait training, therapeutic exercises, patient and family training/education and therapeutic activities      Frequency/Duration: Patient will be followed by physical therapy:  daily to address goals. Recommendation for discharge: (in order for the patient to meet his/her long term goals)  HH PT vs OP cardiac rehab    This discharge recommendation:  Has not yet been discussed the attending provider and/or case management    IF patient discharges home will need the following DME: none         SUBJECTIVE:   Patient stated So what are these exercises?     OBJECTIVE DATA SUMMARY:   Patient mobilized on continuous portable monitor/telemetry.   HISTORY:    Past Medical History:   Diagnosis Date    Adverse effect of anesthesia     took long time to wake up    Arthritis     hands    Asthma     Hypercholesterolemia     Hypertension     Ill-defined condition     \"Idiopathic hypersomnia\"    Ill-defined condition     restless leg syndrome    Ill-defined condition     SEVERE AORTIC STENOSIS    Lumbar herniated disc     Sleep apnea     use cpap     Past Surgical History:   Procedure Laterality Date    HX BACK SURGERY  2016    lumbar 4-5    HX HEART CATHETERIZATION  2020    no stents    HX TONSILLECTOMY      as a child    IR BRONCHOSCOPY  2020    and with pneumonia as a child       Personal factors and/or comorbidities impacting plan of care: AVR, pain    Home Situation  Home Environment: Private residence  # Steps to Enter: 5  Rails to Enter: Yes  Hand Rails : Bilateral  One/Two Story Residence: Two story  # of Interior Steps: 13  Interior Rails: Right  Lift Chair Available: No  Living Alone: No  Support Systems: Spouse/Significant Other/Partner  Current DME Used/Available at Home: CPAP    EXAMINATION/PRESENTATION/DECISION MAKING:     Critical Behavior:  Neurologic State: Alert  Orientation Level: Oriented X4  Cognition: Appropriate decision making, Appropriate for age attention/concentration, Follows commands  Safety/Judgement: Awareness of environment, Fall prevention, Home safety  Hearing: Auditory  Auditory Impairment: None  Hearing Aids/Status: Does not own  Skin:    Edema:   Range Of Motion:  AROM: Generally decreased, functional           PROM: Within functional limits           Strength:    Strength: Generally decreased, functional                    Tone & Sensation:   Tone: Normal              Sensation: Intact               Coordination:  Coordination: Within functional limits  Vision:   Acuity: Impaired near vision; Impaired far vision(tri focals)  Corrective Lenses: Glasses  Functional Mobility:  Bed Mobility:  Rolling: Stand-by assistance  Supine to Sit: Stand-by assistance     Scooting: Contact guard assistance  Transfers:  Sit to Stand: Contact guard assistance; Additional time  Stand to Sit: Minimum assistance        Bed to Chair: Contact guard assistance; Additional time;Assist x1;Minimum assistance              Balance:   Sitting: Intact  Ambulation/Gait Training:  Distance (ft): 100 Feet (ft)  Assistive Device: Gait belt(cardiac cart )  Ambulation - Level of Assistance: Contact guard assistance;Minimal assistance;Assist x2     Gait Description (WDL): Exceptions to WDL  Gait Abnormalities: Decreased step clearance;Trunk sway increased        Base of Support: Widened     Speed/Ct: Pace decreased (<100 feet/min)  Step Length: Right shortened;Left shortened                         Cardiac diagnosis intervention:  Patient instructed and educated on mindful movement principles based on Move in The Tube concept to include maintaining bilateral elbows close to rib cage when performing any load-bearing activity such as getting in/out of bed, pushing up from a chair, opening a door, or lifting a box. Patient was given a handout with diagrams of each correct/incorrect method of performing each of the above tasks. Therapeutic Exercises:   Patient instructed on the benefits and demonstrated cardiac exercises while sitting with Stand-by assistance. Instructed and indicated understanding on how to progress reps, sets against gravity, pacing through progressive muscle strengthening standing based on surgeon clearance for more weight in prep for basic and instrumental ADLs. Instruction on the use of household items in place of weights as needed.     CARDIAC   EXERCISE    Sets    Reps    Active  Active Assist    Passive  Self ROM    Comments    Shoulder flexion  1  5   [x]                            []                             []                             []                                Shoulder abduction  1  5  [x]                             []                             []                             []                                Scapular elevation  1  5  [x]                             []                              []                             []                                Scapular retraction  1  5  [x]                             []                             []                             []                                Trunk rotation  1  5  [x]                             []                             [] []                                Trunk sidebending  1  5  [x]                             []                              []                             []                                          Functional Measure:  Barthel Index:    Bathin  Bladder: 0  Bowels: 10  Groomin  Dressin  Feeding: 10  Mobility: 10  Stairs: 0  Toilet Use: 5  Transfer (Bed to Chair and Back): 10  Total: 55/100       The Barthel ADL Index: Guidelines  1. The index should be used as a record of what a patient does, not as a record of what a patient could do. 2. The main aim is to establish degree of independence from any help, physical or verbal, however minor and for whatever reason. 3. The need for supervision renders the patient not independent. 4. A patient's performance should be established using the best available evidence. Asking the patient, friends/relatives and nurses are the usual sources, but direct observation and common sense are also important. However direct testing is not needed. 5. Usually the patient's performance over the preceding 24-48 hours is important, but occasionally longer periods will be relevant. 6. Middle categories imply that the patient supplies over 50 per cent of the effort. 7. Use of aids to be independent is allowed. Gela Aaron., Barthel, D.W. ([id number]). Functional evaluation: the Barthel Index. [address] [facility] (14)2. Yolanda Dao alphonse GODWIN Parikh, Partha Valencia., Verenice Gamino., [place], [address] [place] [address] (). Measuring the change indisability after inpatient rehabilitation; comparison of the responsiveness of the Barthel Index and Functional [place] Measure. Journal of Neurology, Neurosurgery, and Psychiatry, 66(4), 874-822. CATHERINE Murray.NEEL, CATALINA Yen, & Buzz Waldron MYanetA. (2004.) Assessment of post-stroke quality of life in cost-effectiveness studies: The usefulness of the Barthel Index and the EuroQoL-5D.  Quality of Life Research, 13, 911-60            Physical Therapy Evaluation Charge Determination   History Examination Presentation Decision-Making   MEDIUM  Complexity : 1-2 comorbidities / personal factors will impact the outcome/ POC  MEDIUM Complexity : 3 Standardized tests and measures addressing body structure, function, activity limitation and / or participation in recreation  MEDIUM Complexity : Evolving with changing characteristics  Other outcome measures Barthel   MEDIUM      Based on the above components, the patient evaluation is determined to be of the following complexity level: MEDIUM        Activity Tolerance:   Good, Fair and requires frequent rest breaks    After treatment patient left in no apparent distress:   Sitting in chair and Call bell within reach    COMMUNICATION/EDUCATION:   The patients plan of care was discussed with: Occupational therapist, Registered nurse and Case management. Fall prevention education was provided and the patient/caregiver indicated understanding., Patient/family have participated as able in goal setting and plan of care. and Patient/family agree to work toward stated goals and plan of care.     Thank you for this referral.  Leila Miranda, PT, DPT   Time Calculation: 32 mins

## 2020-12-09 NOTE — ANESTHESIA POSTPROCEDURE EVALUATION
Post-Anesthesia Evaluation and Assessment    Patient: Jaylene Noyola MRN: [id number]  SSN: [id number]    YOB: 1960  Age: 63 y.o. Sex: male      I have evaluated the patient and they are stable and ready for discharge from the PACU. Cardiovascular Function/Vital Signs  Visit Vitals  BP (!) 128/56   Pulse 63   Temp 36.2 °C (97.1 °F)   Resp 15   Ht 6' (1.829 m)   Wt 95.8 kg (211 lb 3.2 oz)   SpO2 100%   BMI 28.64 kg/m²       Patient is status post General anesthesia for Procedure(s):  AORTIC VALVE REPLACEMENT WITH DELNIDO. Nausea/Vomiting: None    Postoperative hydration reviewed and adequate. Pain:  Pain Scale 1: Numeric (0 - 10) (12/09/20 0800)  Pain Intensity 1: 4 (12/09/20 0800)   Managed    Neurological Status:   Neuro (WDL): Within Defined Limits (12/08/20 1009)  Neuro  Neurologic State: Alert (12/09/20 1000)  Orientation Level: Oriented X4 (12/09/20 1000)  Cognition: Appropriate decision making; Appropriate for age attention/concentration; Follows commands (12/09/20 1000)   At baseline    Mental Status, Level of Consciousness: Alert and  oriented to person, place, and time    Pulmonary Status:   O2 Device: Nasal cannula (12/09/20 1057)   Adequate oxygenation and airway patent    Complications related to anesthesia: None    Post-anesthesia assessment completed. No concerns    Signed By: Bairon Elena MD     December 9, 2020              Procedure(s):  AORTIC VALVE REPLACEMENT WITH DELNIDO. general    <BSHSIANPOST>    INITIAL Post-op Vital signs:   Vitals Value Taken Time   BP     Temp     Pulse 60 12/9/2020  2:37 PM   Resp     SpO2 100 % 12/9/2020  2:37 PM   Vitals shown include unvalidated device data.

## 2020-12-09 NOTE — OP NOTES
[address]  OPERATIVE REPORT    Name:  Maicol Chan  MR#:  [id number]  :  1960  ACCOUNT #:  [de-identified]  DATE OF SERVICE:  2020    PREOPERATIVE DIAGNOSES:  1. Severe aortic stenosis. 2.  Hypertension. 3.  Hyperlipidemia. 4.  Bilateral mild carotid artery stenosis. 5.  Obstructive sleep apnea. POSTOPERATIVE DIAGNOSES:  1. Severe aortic stenosis. 2.  Hypertension. 3.  Hyperlipidemia. 4.  Bilateral mild carotid artery stenosis. 5.  Obstructive sleep apnea. PROCEDURES PERFORMED:  1. Aortic valve replacement with #23 Finn Inspiris bioprosthetic valve. 2.  Transesophageal echocardiography. 3.  Epiaortic ultrasound. SURGEON:  Jag Phan MD    ASSISTANT:  Shane Luna PA-C. The assistance of a PA was required due to the critical nature of the surgery and experience of the PA. The PA was present for the entire case. ANESTHESIA:  General endotracheal.    ANESTHESIOLOGIST:  Michael Mead MD    COMPLICATIONS:  None. SPECIMENS REMOVED:  Aortic valve leaflets. IMPLANTS:  As above. ESTIMATED BLOOD LOSS:  500 mL. DETAILS:  The patient is a 78-year-old gentleman with a history of severe symptomatic aortic stenosis. He was referred for open surgical aortic valve replacement. PROCEDURE:  He was prepped and draped in a sterile fashion. A time-out was performed. An incision was made over the sternum. A median sternotomy was performed. The sternal retractor was placed. The pericardium was opened widely and laterally. The ascending aorta was inspected with my finger as well as an epiaortic ultrasound probe that was free and clear of disease. We proceeded with aortic and right atrial cannulation. We also placed an antegrade cardioplegia needle. When we had a therapeutic ACT, we went on cardiopulmonary bypass. We then placed a left ventricular vent to the right superior pulmonary vein. We then applied the aortic cross-clamp.   We had a prompt diastolic arrest with antegrade cardioplegia. We then made our aortotomy. We then resected the aortic valve leaflets. The leaflets themselves were heavily calcified. We then passed our annular suture circumferentially around the aortic valve annulus. We sized this for size 23 Inspiris valve. We then passed these sutures through the bioprosthetic valve. We then set the bioprosthetic valve in place. We had no difficulty with this so we secured the valve with a Cor-Knot. We then began closure of the right aortotomy. We noticed that our aortotomy was very close to the RCA which was unusually high. We carefully placed several interrupted sutures around the os of the RCA. We also closed the rest of the aortotomy in 2 layers. We used BioGlue to reinforce this closure. We then put the head of bed down and removed the aortic cross clamp. We then deaired. We then weaned from cardiopulmonary bypass. We had no issues with hemostasis. We weaned from cardiopulmonary bypass with no ionotropic support. The right ventricle and left ventricle were fine. The mean gradient of the aortic valve was 7. We were very pleased with the surgical result. We then decannulated. We then placed numerous stainless steel wires. We then closed the soft tissue in multiple layers. The patient was then safely transported to the CCU.       MD KEN Brennan/[email or web address]  D:  12/08/2020 17:42  T:  12/09/2020 0:43  JOB #:  [id number]

## 2020-12-09 NOTE — PROGRESS NOTES
"  Dental Cavity    A dental cavity is a pit or crater in the surface of a tooth. This exposes the sensitive inner layer of the tooth and causes pain. If the cavity isnt treated, it will get bigger. It may enter the pulp and cause an infection or abscess in the bone at the root end (apex) of the tooth. An infection in the tooth is a much more serious problem than a cavity. If the tooth gets infected, you will need a root canal or the entire tooth taken out (extraction).  The pain in your tooth may be made worse by eating sweets or drinking hot or cold beverages. It may spread from the tooth to your ear or the area of your jaw on the same side.  Home care  Follow these tips when caring for yourself at home:  · Avoid sweets and hot and cold foods and drinks. Your tooth may be sensitive to changes in temperature.  · If your tooth is chipped or cracked, or if there is a large open cavity, put oil of cloves directly on the tooth to relieve pain. You can buy oil of cloves at drugstores. Some pharmacies carry an over-the-counter "toothache kit." This contains a paste that you can put on the exposed tooth to make it less sensitive.  · Put a cold pack on your jaw over the sore area to help reduce pain.  · You may use over-the-counter medicine to ease pain, unless another medicine was prescribed. If you have chronic liver or kidney disease, talk with your healthcare provider before using acetaminophen or ibuprofen. Also talk with your provider if youve had a stomach ulcer or GI bleeding.  · If you have signs of an infection, you will be given an antibiotic. Take it as directed.  Follow-up care  Follow up with your dentist, or as advised. Your pain may go away with the treatment given today. But only a dentist can fully look at and treat this problem to prevent further tooth damage.  Call 911  Call 911 if any of these occur:  · Difficulty swallowing or breathing  · Weakness or fainting  · Unusual drowsiness  · Headache or " [id number] bedside and verbal report received from Анна Swanson, [facility]. Pt intubated, sedation held, responds to stimuli, Phenylephrine restarted for blood pressure support, second Albumin administered, AAI paced on monitor, lungs diminished, chest tubes in place and draining, coe in place and draining. Potassium replaced for a k of 3.9.  [id number] patient starting to become more alert, eyes open, nods appropriately. Family provided pt at home cpap machine, called made to [employer] to inspect cpap machine. Pt express pain and discomfort, prn pain med administered. Received call from [employer] rep Sixto Maynard for telephone approval for pt home cpap machine, information provided to rep as requested, o.k. to utilize machine per rep. 12/08/20 2000   Hemodynamic Data   PAP Systolic 24   PAP Diastolic 13   REZA (mmHg) 18   CO (l/min) 5.6 l/min   CI (l/min/m2) 2.6 l/min/m2   SVO2 (%) 79 %     2146- patient extubated to 4LNC, voice intact, tolerates sips and chips. 2330- Respiratory therapist at bedside for cpap placement. 0000-reassessment completed, pt resting comfortably on cpap, pt denies pain or discomfort at current time. Patient b/p remains labile. Albumin administered. 12/09/20 0000 12/09/20 0001   Hemodynamic Data   PAP Systolic  --  25   PAP Diastolic  --  16   REZA (mmHg)  --  20   CO (l/min) 5.8 l/min  --    CI (l/min/m2) 2.7 l/min/m2  --    SVO2 (%) 78 %  --      0400- Reassessment completed. No need changes to report. Remains off pressors. 0500- Toradol administered. 12/09/20 0400   Hemodynamic Data   PAP Systolic 20   PAP Diastolic 12   REZA (mmHg) 15   CO (l/min) 6.1 l/min   CI (l/min/m2) 2.7 l/min/m2   SVO2 (%) 78 %     4420-8358 attempt to assist pt up to bed, pt became very anxious, stated \"I'm in so much pain\". Patient then stated \"I'm going to throw-up\" and he did just that, approximately 200 cc's,  assisted pt with bracing incision, administered Ondansetron and prn Dilaudid for pain control. 0630- Patient report nausea relief and some pain relief. stiff neck  When to seek medical advice  Call your healthcare provider right away if any of these occur:  · Redness or swelling of the face  · Pain gets worse or spreads to your neck  · Fever of 100.5 °F (38°C) or higher, or as directed by your healthcare provider  · Pus drains from the tooth or gum  Date Last Reviewed: 10/1/2016  © 9914-9744 [employer]. [address] [place] [address], [place] [address]. All rights reserved. This information is not intended as a substitute for professional medical care. Always follow your healthcare professional's instructions.

## 2020-12-09 NOTE — PROGRESS NOTES
[facility]  ICU Progress Note    Admit Date: 2020  POD:  1 Day Post-Op    Procedure:  Procedure(s):  AORTIC VALVE REPLACEMENT WITH MARILIN        Subjective/24 Hour Summary:   Pt seen with Dr. Kendall Capellan. On nataliia @ 5, insulin. Pain & nausea overnight. No other acute issues. Objective:   Vitals:  Blood pressure (!) 105/52, pulse 69, temperature 97.1 °F (36.2 °C), resp. rate 14, height 6' (1.829 m), weight 211 lb 3.2 oz (95.8 kg), SpO2 98 %. Temp (24hrs), Av.7 °F (35.9 °C), Min:96.1 °F (35.6 °C), Max:98.2 °F (36.8 °C)    Hemodynamics:   CO: CO (l/min): 6 l/min   CI: CI (l/min/m2): 2.7 l/min/m2   CVP: CVP (mmHg): 9 mmHg (20)   SVR:     PAP Systolic: PAP Systolic: 22 (57//80 1389)   PAP Diastolic: PAP Diastolic: 10 (10/33/57 5239)   PVR:     SV02: SVO2 (%): 77 % (20)   SCV02:      EKG/Rhythm:  SR    Extubation Date / Time: 2020 @ 2146    CT Output: 270 (188)    Ventilator Settings:  Mode Rate Tidal Volume Pressure FiO2 PEEP   Spontaneous   500 ml  8 cm H2O 40 % 6 cm H20     Peak airway pressure: 20 cm H2O    Minute ventilation: 7.17 l/min        Oxygen Therapy:  Oxygen Therapy  O2 Sat (%): 98 % (20)  Pulse via Oximetry: 82 beats per minute (20 07)  O2 Device: CPAP mask (20 0400)  O2 Flow Rate (L/min): 3 l/min (20)  FIO2 (%): 40 % (20)    CXR:  CXR Results  (Last 48 hours)               20 0551  XR CHEST PORT Final result    Impression:   impression: Removal of some support devices. Narrative:  Clinical indication: Respiratory failure. Portable AP semiupright view of the chest obtained, comparison . Removal of ET and NG tube, remaining [place]-Blake catheter, no acute infiltrate. 20 1840  XR CHEST PORT Final result    Impression:  IMPRESSION: ET tube is in satisfactory position. Lungs are clear of an acute   process. There is no pneumothorax.  Linear densities in the right suprahilar   region are stable. Narrative:  EXAM:  XR CHEST PORT       INDICATION:  postop heart       COMPARISON:  2020       FINDINGS: A portable AP radiograph of the chest was obtained at 1821 hours. ET   tube is in satisfactory position. [place]-Blake catheter tip overlies right   pulmonary artery. NG tube overlies the stomach. Mediastinal drain is noted in   position. [place] No pneumothorax is identified. Chronic parenchymal changes in the   right suprahilar region are unchanged. [place] Scarce Heart size is normal. Patient status   post aortic valve replacement. . . Admission Weight: Last Weight   Weight: 211 lb 3.2 oz (95.8 kg) Weight: 211 lb 3.2 oz (95.8 kg)     Intake / Output / Drain:  Current Shift: No intake/output data recorded. Last 24 hrs.:     Intake/Output Summary (Last 24 hours) at 12/9/2020 0904  Last data filed at 12/9/2020 0400  Gross per 24 hour   Intake 1708.64 ml   Output 1445 ml   Net 263.64 ml       EXAM:  General:   NAD                                                                                           Lungs:   Clear to auscultation bilaterally. Incision:  No erythema, drainage or swelling. Heart:  Regular rate and rhythm, S1, S2 normal, no murmur, click, rub or gallop. Abdomen:   Soft, non-tender. Bowel sounds normal. No masses,  No organomegaly. Extremities:  No edema. PPP. Neurologic:  Gross motor and sensory apparatus intact.      Labs:   Recent Labs     12/09/20 0314 12/08/20 2218 12/08/20 1817   WBC 9.6  --  13.9*   HGB 9.7* 11.1* 11.7*   HCT 30.1* 33.9* 36.2*   *  --  134*     Recent Labs     12/09/20 0314 12/08/20 2218    142   K 3.9 4.1   * 115*   CO2 25 24   BUN 17 14   CREA 0.74 0.85   GLU 98 124*   CA 8.3* 7.5*   MG 2.5* 2.7*     Recent Labs     12/09/20 0314 12/08/20 2218   AP 41* 45   TP 5.8* 5.8*   ALB 3.9 3.7   GLOB 1.9* 2.1     Recent Labs     12/08/20 1817   INR 1.2*   PTP 12.1*   APTT 24.6      No results for input(s): PHI, PCO2I, PO2I, FIO2I in the last 72 hours. No results for input(s): CPK, CKMB, TROIQ, BNPP in the last 72 hours. Assessment:     Active Problems: Aortic stenosis (10/27/2020)      Aortic stenosis, severe (2020)      S/P AVR (aortic valve replacement) (2020)         Plan/Recommendations/Medical Decision Makin. Aortic Stenosis s/p tissue AVR: ASA/Plavix per Dr. Willa Wooten. Wean nataliia off. Volume resuscitation.     2. CAD -Minimal by cath, ASA, Statin, start BB when able for GDMT.     3. Bilateral Carotid Artery Stenosis - Mild     4. Dyslipidemia - Zocor     5. Asthma/ZAKIA/RLS - CPAP machine, sees    Singulair, Zyrtec and Mirapex     6. Hypertension - currently hypotensive. Will need BB when able.     7. Hypersomnia - Adderal    8. Acute post op respiratory insufficiency: wean NC as tolerated for O2 sats >92%. TCDB. IS, progressive ambulation. 9. Acute blood loss anemia: monitor HH, chest tube output    10. Nausea: increase zofran to 8 mg q4h. Monitor closely. Belly soft. 11. Prophylaxis: lovenox, Protonix. Dispo: reeval chest tubes later today. Stepdown.     Signed By: JANICE Spence

## 2020-12-09 NOTE — PROGRESS NOTES
0700 Report received from Roscoe Zurita RN.  7287 Patient's art line positional, patient on DDD spiking inapproiately. Currently changed to DDD  1045 PT/OT Working with patient patient with 150 cc emesis. 1056 Up in chair.   1500 Back to bed, coe removed per Dr Jaki Brock.  1900 Report to Sandeep Barba RN

## 2020-12-10 ENCOUNTER — APPOINTMENT (OUTPATIENT)
Dept: GENERAL RADIOLOGY | Age: 60
DRG: 220 | End: 2020-12-10
Attending: PHYSICIAN ASSISTANT
Payer: COMMERCIAL

## 2020-12-10 ENCOUNTER — TRANSCRIBE ORDER (OUTPATIENT)
Dept: CARDIAC REHAB | Age: 60
End: 2020-12-10

## 2020-12-10 DIAGNOSIS — Z95.4 HEART VALVE REPLACED BY OTHER MEANS: Primary | ICD-10-CM

## 2020-12-10 LAB
ADMINISTERED INITIALS, ADMINIT: NORMAL
ALBUMIN SERPL-MCNC: 3.5 G/DL (ref 3.5–5)
ALBUMIN/GLOB SERPL: 1.7 {RATIO} (ref 1.1–2.2)
ALP SERPL-CCNC: 35 U/L (ref 45–117)
ALT SERPL-CCNC: 28 U/L (ref 12–78)
ANION GAP SERPL CALC-SCNC: 2 MMOL/L (ref 5–15)
AST SERPL-CCNC: 38 U/L (ref 15–37)
BILIRUB SERPL-MCNC: 0.5 MG/DL (ref 0.2–1)
BUN SERPL-MCNC: 25 MG/DL (ref 6–20)
BUN/CREAT SERPL: 24 (ref 12–20)
CALCIUM SERPL-MCNC: 8.2 MG/DL (ref 8.5–10.1)
CHLORIDE SERPL-SCNC: 109 MMOL/L (ref 97–108)
CO2 SERPL-SCNC: 27 MMOL/L (ref 21–32)
CREAT SERPL-MCNC: 1.05 MG/DL (ref 0.7–1.3)
D50 ADMINISTERED, D50ADM: 0 ML
D50 ORDER, D50ORD: 0 ML
ERYTHROCYTE [DISTWIDTH] IN BLOOD BY AUTOMATED COUNT: 14.2 % (ref 11.5–14.5)
GLOBULIN SER CALC-MCNC: 2.1 G/DL (ref 2–4)
GLSCOM COMMENTS: NORMAL
GLUCOSE BLD STRIP.AUTO-MCNC: 100 MG/DL (ref 65–100)
GLUCOSE BLD STRIP.AUTO-MCNC: 101 MG/DL (ref 65–100)
GLUCOSE BLD STRIP.AUTO-MCNC: 102 MG/DL (ref 65–100)
GLUCOSE BLD STRIP.AUTO-MCNC: 105 MG/DL (ref 65–100)
GLUCOSE BLD STRIP.AUTO-MCNC: 106 MG/DL (ref 65–100)
GLUCOSE BLD STRIP.AUTO-MCNC: 108 MG/DL (ref 65–100)
GLUCOSE BLD STRIP.AUTO-MCNC: 117 MG/DL (ref 65–100)
GLUCOSE BLD STRIP.AUTO-MCNC: 126 MG/DL (ref 65–100)
GLUCOSE BLD STRIP.AUTO-MCNC: 130 MG/DL (ref 65–100)
GLUCOSE BLD STRIP.AUTO-MCNC: 133 MG/DL (ref 65–100)
GLUCOSE BLD STRIP.AUTO-MCNC: 144 MG/DL (ref 65–100)
GLUCOSE BLD STRIP.AUTO-MCNC: 146 MG/DL (ref 65–100)
GLUCOSE BLD STRIP.AUTO-MCNC: 153 MG/DL (ref 65–100)
GLUCOSE BLD STRIP.AUTO-MCNC: 86 MG/DL (ref 65–100)
GLUCOSE BLD STRIP.AUTO-MCNC: 94 MG/DL (ref 65–100)
GLUCOSE BLD STRIP.AUTO-MCNC: 95 MG/DL (ref 65–100)
GLUCOSE SERPL-MCNC: 108 MG/DL (ref 65–100)
GLUCOSE, GLC: 100 MG/DL
GLUCOSE, GLC: 101 MG/DL
GLUCOSE, GLC: 102 MG/DL
GLUCOSE, GLC: 105 MG/DL
GLUCOSE, GLC: 106 MG/DL
GLUCOSE, GLC: 108 MG/DL
GLUCOSE, GLC: 117 MG/DL
GLUCOSE, GLC: 126 MG/DL
GLUCOSE, GLC: 133 MG/DL
GLUCOSE, GLC: 144 MG/DL
GLUCOSE, GLC: 146 MG/DL
GLUCOSE, GLC: 153 MG/DL
GLUCOSE, GLC: 86 MG/DL
GLUCOSE, GLC: 94 MG/DL
GLUCOSE, GLC: 95 MG/DL
HCT VFR BLD AUTO: 31.9 % (ref 36.6–50.3)
HGB BLD-MCNC: 10.1 G/DL (ref 12.1–17)
HIGH TARGET, HITG: 130 MG/DL
INSULIN ADMINSTERED, INSADM: 1.2 UNITS/HOUR
INSULIN ADMINSTERED, INSADM: 1.3 UNITS/HOUR
INSULIN ADMINSTERED, INSADM: 1.4 UNITS/HOUR
INSULIN ADMINSTERED, INSADM: 1.6 UNITS/HOUR
INSULIN ADMINSTERED, INSADM: 1.6 UNITS/HOUR
INSULIN ADMINSTERED, INSADM: 1.7 UNITS/HOUR
INSULIN ADMINSTERED, INSADM: 1.8 UNITS/HOUR
INSULIN ADMINSTERED, INSADM: 2.4 UNITS/HOUR
INSULIN ADMINSTERED, INSADM: 2.9 UNITS/HOUR
INSULIN ADMINSTERED, INSADM: 4 UNITS/HOUR
INSULIN ADMINSTERED, INSADM: 4.2 UNITS/HOUR
INSULIN ADMINSTERED, INSADM: 4.5 UNITS/HOUR
INSULIN ADMINSTERED, INSADM: 5.2 UNITS/HOUR
INSULIN ORDER, INSORD: 1.2 UNITS/HOUR
INSULIN ORDER, INSORD: 1.3 UNITS/HOUR
INSULIN ORDER, INSORD: 1.4 UNITS/HOUR
INSULIN ORDER, INSORD: 1.6 UNITS/HOUR
INSULIN ORDER, INSORD: 1.6 UNITS/HOUR
INSULIN ORDER, INSORD: 1.7 UNITS/HOUR
INSULIN ORDER, INSORD: 1.8 UNITS/HOUR
INSULIN ORDER, INSORD: 2.4 UNITS/HOUR
INSULIN ORDER, INSORD: 2.9 UNITS/HOUR
INSULIN ORDER, INSORD: 4 UNITS/HOUR
INSULIN ORDER, INSORD: 4.2 UNITS/HOUR
INSULIN ORDER, INSORD: 4.5 UNITS/HOUR
INSULIN ORDER, INSORD: 5.2 UNITS/HOUR
LOW TARGET, LOT: 95 MG/DL
MAGNESIUM SERPL-MCNC: 2.4 MG/DL (ref 1.6–2.4)
MCH RBC QN AUTO: 28.4 PG (ref 26–34)
MCHC RBC AUTO-ENTMCNC: 31.7 G/DL (ref 30–36.5)
MCV RBC AUTO: 89.6 FL (ref 80–99)
MINUTES UNTIL NEXT BG, NBG: 60 MIN
MULTIPLIER, MUL: 0.03
MULTIPLIER, MUL: 0.04
MULTIPLIER, MUL: 0.05
MULTIPLIER, MUL: 0.06
NRBC # BLD: 0 K/UL (ref 0–0.01)
NRBC BLD-RTO: 0 PER 100 WBC
ORDER INITIALS, ORDINIT: NORMAL
PLATELET # BLD AUTO: 114 K/UL (ref 150–400)
PMV BLD AUTO: 11.7 FL (ref 8.9–12.9)
POTASSIUM SERPL-SCNC: 4.1 MMOL/L (ref 3.5–5.1)
PROT SERPL-MCNC: 5.6 G/DL (ref 6.4–8.2)
RBC # BLD AUTO: 3.56 M/UL (ref 4.1–5.7)
SERVICE CMNT-IMP: ABNORMAL
SERVICE CMNT-IMP: NORMAL
SODIUM SERPL-SCNC: 138 MMOL/L (ref 136–145)
WBC # BLD AUTO: 14.2 K/UL (ref 4.1–11.1)

## 2020-12-10 PROCEDURE — 74011000258 HC RX REV CODE- 258: Performed by: PHYSICIAN ASSISTANT

## 2020-12-10 PROCEDURE — 74011250637 HC RX REV CODE- 250/637: Performed by: PHYSICIAN ASSISTANT

## 2020-12-10 PROCEDURE — 77010033678 HC OXYGEN DAILY

## 2020-12-10 PROCEDURE — 74011636637 HC RX REV CODE- 636/637: Performed by: PHYSICIAN ASSISTANT

## 2020-12-10 PROCEDURE — 74011000250 HC RX REV CODE- 250: Performed by: PHYSICIAN ASSISTANT

## 2020-12-10 PROCEDURE — 97116 GAIT TRAINING THERAPY: CPT

## 2020-12-10 PROCEDURE — 71045 X-RAY EXAM CHEST 1 VIEW: CPT

## 2020-12-10 PROCEDURE — 97535 SELF CARE MNGMENT TRAINING: CPT | Performed by: OCCUPATIONAL THERAPIST

## 2020-12-10 PROCEDURE — 85027 COMPLETE CBC AUTOMATED: CPT

## 2020-12-10 PROCEDURE — 36415 COLL VENOUS BLD VENIPUNCTURE: CPT

## 2020-12-10 PROCEDURE — 74011250637 HC RX REV CODE- 250/637: Performed by: NURSE PRACTITIONER

## 2020-12-10 PROCEDURE — 2709999900 HC NON-CHARGEABLE SUPPLY

## 2020-12-10 PROCEDURE — 65660000000 HC RM CCU STEPDOWN

## 2020-12-10 PROCEDURE — 74011250636 HC RX REV CODE- 250/636: Performed by: PHYSICIAN ASSISTANT

## 2020-12-10 PROCEDURE — 82962 GLUCOSE BLOOD TEST: CPT

## 2020-12-10 PROCEDURE — 74011250637 HC RX REV CODE- 250/637: Performed by: THORACIC SURGERY (CARDIOTHORACIC VASCULAR SURGERY)

## 2020-12-10 PROCEDURE — 80053 COMPREHEN METABOLIC PANEL: CPT

## 2020-12-10 PROCEDURE — 74011636637 HC RX REV CODE- 636/637: Performed by: THORACIC SURGERY (CARDIOTHORACIC VASCULAR SURGERY)

## 2020-12-10 PROCEDURE — 97110 THERAPEUTIC EXERCISES: CPT | Performed by: OCCUPATIONAL THERAPIST

## 2020-12-10 PROCEDURE — 83735 ASSAY OF MAGNESIUM: CPT

## 2020-12-10 PROCEDURE — 97110 THERAPEUTIC EXERCISES: CPT

## 2020-12-10 RX ORDER — FUROSEMIDE 40 MG/1
40 TABLET ORAL DAILY
Status: DISCONTINUED | OUTPATIENT
Start: 2020-12-10 | End: 2020-12-11

## 2020-12-10 RX ORDER — SODIUM CHLORIDE 0.9 % (FLUSH) 0.9 %
5-40 SYRINGE (ML) INJECTION EVERY 8 HOURS
Status: DISCONTINUED | OUTPATIENT
Start: 2020-12-10 | End: 2020-12-12 | Stop reason: HOSPADM

## 2020-12-10 RX ORDER — POTASSIUM CHLORIDE 750 MG/1
20 TABLET, FILM COATED, EXTENDED RELEASE ORAL DAILY
Status: DISCONTINUED | OUTPATIENT
Start: 2020-12-10 | End: 2020-12-12 | Stop reason: HOSPADM

## 2020-12-10 RX ORDER — METOPROLOL TARTRATE 25 MG/1
12.5 TABLET, FILM COATED ORAL 2 TIMES DAILY
Status: DISCONTINUED | OUTPATIENT
Start: 2020-12-10 | End: 2020-12-12 | Stop reason: HOSPADM

## 2020-12-10 RX ORDER — METHYLPHENIDATE HYDROCHLORIDE 5 MG/1
5 TABLET ORAL DAILY
Status: DISCONTINUED | OUTPATIENT
Start: 2020-12-10 | End: 2020-12-12 | Stop reason: HOSPADM

## 2020-12-10 RX ORDER — INSULIN GLARGINE 100 [IU]/ML
16 INJECTION, SOLUTION SUBCUTANEOUS ONCE
Status: COMPLETED | OUTPATIENT
Start: 2020-12-10 | End: 2020-12-10

## 2020-12-10 RX ORDER — SODIUM CHLORIDE 0.9 % (FLUSH) 0.9 %
5-40 SYRINGE (ML) INJECTION AS NEEDED
Status: DISCONTINUED | OUTPATIENT
Start: 2020-12-10 | End: 2020-12-12 | Stop reason: HOSPADM

## 2020-12-10 RX ADMIN — CLOPIDOGREL BISULFATE 75 MG: 75 TABLET ORAL at 08:44

## 2020-12-10 RX ADMIN — FUROSEMIDE 40 MG: 40 TABLET ORAL at 08:44

## 2020-12-10 RX ADMIN — TAMSULOSIN HYDROCHLORIDE 0.4 MG: 0.4 CAPSULE ORAL at 08:44

## 2020-12-10 RX ADMIN — PRAMIPEXOLE DIHYDROCHLORIDE 0.5 MG: 1 TABLET ORAL at 21:47

## 2020-12-10 RX ADMIN — DOCUSATE SODIUM - SENNOSIDES 1 TABLET: 50; 8.6 TABLET, FILM COATED ORAL at 20:18

## 2020-12-10 RX ADMIN — Medication 5 TABLET: at 08:44

## 2020-12-10 RX ADMIN — OXYCODONE HYDROCHLORIDE 10 MG: 5 TABLET ORAL at 16:39

## 2020-12-10 RX ADMIN — PANTOPRAZOLE SODIUM 40 MG: 40 TABLET, DELAYED RELEASE ORAL at 07:39

## 2020-12-10 RX ADMIN — MONTELUKAST SODIUM 10 MG: 10 TABLET ORAL at 08:44

## 2020-12-10 RX ADMIN — Medication 10 ML: at 05:59

## 2020-12-10 RX ADMIN — METHYLPHENIDATE HYDROCHLORIDE 5 MG: 5 TABLET ORAL at 08:56

## 2020-12-10 RX ADMIN — MAGNESIUM OXIDE TAB 400 MG (241.3 MG ELEMENTAL MG) 400 MG: 400 (241.3 MG) TAB at 08:47

## 2020-12-10 RX ADMIN — POTASSIUM CHLORIDE 20 MEQ: 750 TABLET, FILM COATED, EXTENDED RELEASE ORAL at 10:34

## 2020-12-10 RX ADMIN — Medication 10 ML: at 21:48

## 2020-12-10 RX ADMIN — MAGNESIUM OXIDE TAB 400 MG (241.3 MG ELEMENTAL MG) 400 MG: 400 (241.3 MG) TAB at 20:18

## 2020-12-10 RX ADMIN — CHLORHEXIDINE GLUCONATE 0.12% ORAL RINSE 10 ML: 1.2 LIQUID ORAL at 08:44

## 2020-12-10 RX ADMIN — SODIUM CHLORIDE 1.4 UNITS/HR: 9 INJECTION, SOLUTION INTRAVENOUS at 04:44

## 2020-12-10 RX ADMIN — VANCOMYCIN HYDROCHLORIDE 1500 MG: 10 INJECTION, POWDER, LYOPHILIZED, FOR SOLUTION INTRAVENOUS at 08:56

## 2020-12-10 RX ADMIN — SODIUM CHLORIDE 2.4 UNITS/HR: 9 INJECTION, SOLUTION INTRAVENOUS at 12:04

## 2020-12-10 RX ADMIN — ROSUVASTATIN 20 MG: 20 TABLET, FILM COATED ORAL at 21:47

## 2020-12-10 RX ADMIN — MUPIROCIN: 20 OINTMENT TOPICAL at 08:44

## 2020-12-10 RX ADMIN — POLYETHYLENE GLYCOL 3350 17 G: 17 POWDER, FOR SOLUTION ORAL at 08:44

## 2020-12-10 RX ADMIN — ACETAMINOPHEN 650 MG: 325 TABLET ORAL at 03:26

## 2020-12-10 RX ADMIN — THERA TABS 1 TABLET: TAB at 08:44

## 2020-12-10 RX ADMIN — METOPROLOL TARTRATE 12.5 MG: 25 TABLET, FILM COATED ORAL at 20:18

## 2020-12-10 RX ADMIN — OXYCODONE HYDROCHLORIDE 5 MG: 5 TABLET ORAL at 08:46

## 2020-12-10 RX ADMIN — METHYLPHENIDATE HYDROCHLORIDE 20 MG: 5 TABLET ORAL at 07:39

## 2020-12-10 RX ADMIN — SODIUM CHLORIDE 1.8 UNITS/HR: 9 INJECTION, SOLUTION INTRAVENOUS at 05:57

## 2020-12-10 RX ADMIN — ENOXAPARIN SODIUM 40 MG: 40 INJECTION SUBCUTANEOUS at 10:34

## 2020-12-10 RX ADMIN — CHLORHEXIDINE GLUCONATE 0.12% ORAL RINSE 10 ML: 1.2 LIQUID ORAL at 21:46

## 2020-12-10 RX ADMIN — OXYCODONE HYDROCHLORIDE 5 MG: 5 TABLET ORAL at 21:47

## 2020-12-10 RX ADMIN — MUPIROCIN: 20 OINTMENT TOPICAL at 21:48

## 2020-12-10 RX ADMIN — INSULIN GLARGINE 16 UNITS: 100 INJECTION, SOLUTION SUBCUTANEOUS at 16:39

## 2020-12-10 RX ADMIN — CETIRIZINE HYDROCHLORIDE 10 MG: 10 TABLET, FILM COATED ORAL at 08:44

## 2020-12-10 RX ADMIN — DOCUSATE SODIUM - SENNOSIDES 1 TABLET: 50; 8.6 TABLET, FILM COATED ORAL at 08:44

## 2020-12-10 RX ADMIN — ASPIRIN 81 MG CHEWABLE TABLET 81 MG: 81 TABLET CHEWABLE at 08:44

## 2020-12-10 RX ADMIN — PRAMIPEXOLE DIHYDROCHLORIDE 0.12 MG: 0.25 TABLET ORAL at 15:12

## 2020-12-10 NOTE — PROGRESS NOTES
[facility]  ICU Progress Note    Admit Date: 2020  POD:  2 Day Post-Op    Procedure:  Procedure(s):  AORTIC VALVE REPLACEMENT WITH MARILIN        Subjective/24 Hour Summary:   Pt seen with Dr. Willa Wooten. Afebrile, on RA. Up in chair. Feels well this morning. Had issues with RLS last night. Objective:   Vitals:  Blood pressure 133/65, pulse 74, temperature 98.5 °F (36.9 °C), resp. rate 16, height 6' (1.829 m), weight 238 lb 8.6 oz (108.2 kg), SpO2 100 %. Temp (24hrs), Av.3 °F (36.8 °C), Min:97.9 °F (36.6 °C), Max:98.9 °F (37.2 °C)    EKG/Rhythm:  SR    Extubation Date / Time: 2020 @ 2146    CT Output: 180ml/24 hrs    Ventilator Settings:  Mode Rate Tidal Volume Pressure FiO2 PEEP   Spontaneous   500 ml  8 cm H2O 40 % 6 cm H20     Peak airway pressure: 20 cm H2O    Minute ventilation: 7.17 l/min        Oxygen Therapy:  Oxygen Therapy  O2 Sat (%): 100 % (12/10/20 0800)  Pulse via Oximetry: 73 beats per minute (12/10/20 0800)  O2 Device: Room air (12/10/20 0625)  O2 Flow Rate (L/min): 3 l/min (20)  O2 Temperature: 35.6 °F (2 °C) (20 08)  FIO2 (%): 40 % (20)    CXR:  CXR Results  (Last 48 hours)               12/10/20 0545  XR CHEST PORT Final result    Impression:  IMPRESSION: Diminished lung volumes with minimal bibasilar atelectasis. Narrative:  INDICATION: Postop heart surgery       COMPARISON: 2020       FINDINGS: AP portable imaging of the chest performed at 5:17 AM demonstrates   unchanged cardiomegaly. The patient is status post median sternotomy and aortic   valve replacement. Lung volumes are diminished. There is minimal bibasilar   atelectasis. No significant osseous abnormalities are seen. 20 0551  XR CHEST PORT Final result    Impression:   impression: Removal of some support devices. Narrative:  Clinical indication: Respiratory failure.        Portable AP semiupright view of the chest obtained, comparison December 8. Removal of ET and NG tube, remaining [place]-Blake catheter, no acute infiltrate. 12/08/20 1840  XR CHEST PORT Final result    Impression:  IMPRESSION: ET tube is in satisfactory position. Lungs are clear of an acute   process. There is no pneumothorax. Linear densities in the right suprahilar   region are stable. Narrative:  EXAM:  XR CHEST PORT       INDICATION:  postop heart       COMPARISON:  2020       FINDINGS: A portable AP radiograph of the chest was obtained at 1821 hours. ET   tube is in satisfactory position. [place]-Blake catheter tip overlies right   pulmonary artery. NG tube overlies the stomach. Mediastinal drain is noted in   position. [place] No pneumothorax is identified. Chronic parenchymal changes in the   right suprahilar region are unchanged. [place] Heart size is normal. Patient status   post aortic valve replacement. . . Admission Weight: Last Weight   Weight: 211 lb 3.2 oz (95.8 kg) Weight: 238 lb 8.6 oz (108.2 kg)     Intake / Output / Drain:  Current Shift: No intake/output data recorded. Last 24 hrs.:     Intake/Output Summary (Last 24 hours) at 12/10/2020 0844  Last data filed at 12/10/2020 0700  Gross per 24 hour   Intake 2521.46 ml   Output 530 ml   Net 1991.46 ml       EXAM:  General:   NAD                                                                                           Lungs:   Clear upper diminished bases to auscultation bilaterally. Incision:  dsg cdi   Heart:  Regular rate and rhythm, S1, S2 normal, no murmur, click, rub or gallop. Abdomen:   Soft, non-tender. Bowel sounds hypoactive. No masses,  No organomegaly. Extremities:  No edema. PPP. Neurologic:  Gross motor and sensory apparatus intact.      Labs:   Recent Labs     12/10/20  0319 12/09/20  0314   WBC 14.2* 9.6   HGB 10.1* 9.7*   HCT 31.9* 30.1*   * 109*     Recent Labs     12/10/20  0319 12/09/20  0314    142   K 4.1 3.9   * 114*   CO2 27 25   BUN 25* 17   CREA 1.05 0.74   * 98   CA 8.2* 8.3*   MG 2.4 2.5*     Recent Labs     12/10/20  0319 20  0314   AP 35* 41*   TP 5.6* 5.8*   ALB 3.5 3.9   GLOB 2.1 1.9*     Recent Labs     20  1817   INR 1.2*   PTP 12.1*   APTT 24.6      No results for input(s): PHI, PCO2I, PO2I, FIO2I in the last 72 hours. No results for input(s): CPK, CKMB, TROIQ, BNPP in the last 72 hours. Assessment:     Active Problems: Aortic stenosis (10/27/2020)      Aortic stenosis, severe (2020)      S/P AVR (aortic valve replacement) (2020)         Plan/Recommendations/Medical Decision Makin. Aortic Stenosis s/p tissue AVR: ASA/Plavix per Dr. Minnie Mahmood.      2. CAD -Minimal by cath, ASA, Statin, start BB when able for GDMT.     3. Bilateral Carotid Artery Stenosis - Mild     4. Dyslipidemia - on statin     5. Asthma/ZAKIA/RLS - CPAP machine, sees    Singulair, Zyrtec and Mirapex     6. Hypertension - no antihypertensives until appropriate.     7. Hypersomnia - on ritalin and sunosi PTA. Will try lower dose of ritalin today -pt reports yesterday when he took 1 dose of 40 mg he felt his heart racing (normally takes 40 mg QID). 8. Acute post op respiratory insufficiency: wean NC as tolerated for O2 sats >92%. TCDB. IS, progressive ambulation. Give 40 PO lasix today. 9. Acute blood loss anemia: monitor HH, chest tube output    10. Nausea: much improved. 11. Prophylaxis: lovenox, Protonix. Dispo: has transfer orders to stepdown. DC CT today.      Signed By: Spencer Woody NP

## 2020-12-10 NOTE — DIABETES MGMT
[address] [facility]    CLINICAL NURSE SPECIALIST CONSULT   INITIAL NOTE    Presentation   Merline Bock is a 63 y.o. male admitted for scheduled surgical intervention 12/8/20, due to fatigue and dyspnea for several months. Accompanying symptoms include chest pain, palpitations, lightheadedness and syncope. HX:   Past Medical History:   Diagnosis Date    Adverse effect of anesthesia     took long time to wake up    Arthritis     hands    Asthma     Hypercholesterolemia     Hypertension     Ill-defined condition     \"Idiopathic hypersomnia\"    Ill-defined condition     restless leg syndrome    Ill-defined condition     SEVERE AORTIC STENOSIS    Lumbar herniated disc     Sleep apnea     use cpap     DX: AVR    TX: 12/8/20 AORTIC VALVE REPLACEMENT (23 mm Inspiris). Tanna Chisholm. Current clinical course has been uncomplicated. 12/10/20 On room air. CT +. Less nausea per patient report. Used 11.2 units of insulin 12/8/20. Used 15.1 units of insulin 12/9/20. Patient did not have diabetes PTA. Family history of diabetes positive in maternal grandmother. Admission BG 96 and A1c 5.7% confirm non-diabetic state. Consulted by Provider for advanced diabetes nursing assessment and care, specifically related to   [x] Transitioning off [address] [place] [address] don't have diabetes. I gained 40 lbs since COVID started, and I know I have to lose that. I want to get back to 180 lbs.     Patient reports the following home self-care practices:  Eating pattern  [x] Breakfast Hot or cold cereal. Sometimes eggs. [x] Lunch  [place]  [x] Dinner  \"More pasta\" than he should. Fish, chicken & ground turkey. Vegetables. [x] Beverages Water  Physical activity pattern - Used to walk 45 minutes every day until current symptoms began developing six months ago. Works in his yard. Always \"busy. \"  [x] Non-Aerobic exercise      Objective   Physical exam  General Alert, oriented and in no acute distress. Conversant and cooperative. Vital Signs Afebrile. NSR. Normotensive. Visit Vitals  /65 (BP 1 Location: Left arm, BP Patient Position: Sitting)   Pulse 74   Temp 98 °F (36.7 °C)   Resp 18   Ht 6' (1.829 m)   Wt 238 lb 8.6 oz (108.2 kg)   SpO2 100%   BMI 32.35 kg/m²      Laboratory  Tests Today 12/9 12/8   A1c       98 130   Anion gap 2 3 5   Serum triglycerides      WBC 14.2 9.6 13.9   Serum creatinine 1.05 0.74 0.9   GFR >60 >60 >60   AST 38 36 33   ALT 28 30 34     12/10/20 CXR: Diminished lung volumes with minimal bibasilar atelectasis. Factors affecting BG management  Factor Dose Comments   Nutrition:  Cardiac Regular meals          Has struggled with nausea over the past 24 hours. Ate (2) jellos yesterday. Pain Dilaudid available Rated 0-4. Infection  Afebrile. WBC 14.2. Blood glucose pattern        Assessment and Plan   Nursing Diagnosis Risk for unstable blood glucose pattern   Nursing Intervention Domain 6968 Decision-making Support   Nursing Interventions Examined current inpatient diabetes control   Explored factors facilitating and impeding inpatient management  Identified self-management practices impeding diabetes control     Evaluation   This  male did not have diabetes PTA. This is confirmed by admission BG of 96 and A1c of 5.7%. Forty pound weight gain puts him at risk for the development of Type 2 diabetes with his genetic risk. He is eager to get his energy back so he can return to his usual PA levels. During this hospitalization, the patient has easily achieved inpatient blood glucose target of 100-180mg/dl. Suspect he will transition off [address] with ease.     Recommendations   Recommend:    Glucostabilizer per protocol    Transition off GS @ 630pm this evening per protocol    Billing Code(s)   I personally reviewed medical record, including notes, data and current medications, and examined the patient at bedside before making care recommendations.      [x] [id number] IP subsequent hospital care - 35 minutes [] [id number] Prolonged Services - 65 minutes [] [id number] Prolonged Services - 110 minutes  [] [id number] IP subsequent hospital care - 25 minutes [] [id number] Prolonged Services - 55 minutes [] 23149 Prolonged Services - 100 minutes  [] 88337 IP subsequent hospital care - 15 minutes [] 44997 Prolonged Services - 45 minutes [] 71932 Prolonged Services - 90 minutes    NICOLE Phillips  Diabetes Clinical Nurse Specialist  [facility]  Access via Kerwin Molina

## 2020-12-10 NOTE — INTERDISCIPLINARY ROUNDS
Critical care interdisciplinary rounds held on [id number]. Following members present, Pharmacy, Diabetes Treatment, Case Management, Occupational Therapy, Physical Therapy, Pastoral Care  and Nutrition. Plan of care discussed. See clinical pathway fo plan of care and interventions and desired outcomes.

## 2020-12-10 NOTE — PROGRESS NOTES
Transition of Care Plan:          Plan is Home with Family and [facility] and Pt selected [facility]. FOC on bedside chart. Referral sent to them via CC. - Pending acceptance at this time.  -Pt was told he will probably DC over the weekend. 3:38 PM   [facility] can accept. Information added to AVS.    CM to notify them of DC date once that is verified. Wife can transport Pt home in car    CM to help make appt prior to DC.   -CM emailed CM specialist to make PCP appt. Reason for Admission:   Pt was admitted on 12/8/20 d/t Dx of Aortic Stenosis. Procedure: Aortic Valve Replacement. RUR Score:     23             PCP: First and Last name:  Cristopher Mata   Name of Practice: [facility]   Are you a current patient: Yes/No: Yes   Approximate date of last visit: September 2020   Can you participate in a virtual visit if needed: yes    Do you (patient/family) have any concerns for transition/discharge? No concerns at this time. Plan for utilizing home health:   CM sending referral to     Current Advanced Directive/Advance Care Plan:  Pt is Full Code. No AMD on file. CM provided him with a copy of [facility] AMD and encouraged him to complete and put on permanent record here at hospital or PCP office for emergency. Pt was not interested in completing today. Pt is alert and oriented. Lives with wife in two story home with 5 SILAS. DME CPAP  No HH or SNF experience. Pt was I  W ADLs and IADLs to include driving prior to hospitalization. Pharmacy is [facility] at [address]  No concerns paying for meds at this time. Wife can transport Pt home. Care Management Interventions  PCP Verified by CM: Yes  Palliative Care Criteria Met (RRAT>21 & CHF Dx)?: No  Mode of Transport at Discharge:  Other (see comment)  Transition of Care Consult (CM Consult): Discharge Planning  MyChart Signup: No  Discharge Durable Medical Equipment: No  Physical Therapy Consult: Yes  Occupational Therapy Consult: Yes  Speech Therapy Consult: No  Current Support Network: Lives with Spouse  Confirm Follow Up Transport: Family  The Plan for Transition of Care is Related to the Following Treatment Goals : Eva  The Patient and/or Patient Representative was Provided with a Choice of Provider and Agrees with the Discharge Plan?: Yes  Name of the Patient Representative Who was Provided with a Choice of Provider and Agrees with the Discharge Plan: Pt   Freedom of Choice List was Provided with Basic Dialogue that Supports the Patient's Individualized Plan of Care/Goals, Treatment Preferences and Shares the Quality Data Associated with the Providers?: Yes  Discharge Location  Discharge Placement: Home with home health    Felisha Bass, [address] [place] [address]

## 2020-12-10 NOTE — PROGRESS NOTES
1056 - TRANSFER - IN REPORT:    Verbal report received from Ynhi(name) on Irineo Cos II  being received from CCU(unit) for routine progression of care      Report consisted of patients Situation, Background, Assessment and   Recommendations(SBAR). Information from the following report(s) SBAR, Kardex, Procedure Summary and Intake/Output was reviewed with the receiving nurse. Opportunity for questions and clarification was provided. Assessment completed upon patients arrival to unit and care assumed. Primary Nurse Steven Tillman RN and Patrica Flores RN performed a dual skin assessment on this patient Impairment noted- see wound doc flow sheet  Steven score is 19. No pressure injury noted. 1430 - Assisted pt back to bed after ambulating to bathroom. C/O sharp pains to chest/incison while laying flat. Subsided after sitting up straight in bed.     1800 - Ambulated in [place] with pt to silver elevators and back to room. Pt tolerated well. 1930 - End of Shift Note    Bedside shift change report given to [facility] (oncoming nurse) by Steven Tillman RN (offgoing nurse). Report included the following information SBAR, Kardex, Procedure Summary and Intake/Output    Shift worked:  7a-7p       Shift summary and any significant changes:     Pt tolerating ambulation, concerned about laying flat when he goes home. Wife looking into options for recliner chair. Insulin gtt off at 6:45pm   Concerns for physician to address:       Zone phone for oncoming shift:   1130       Activity:  Activity Level:  Up with Assistance  Number times ambulated in hallways past shift: 3  Number of times OOB to chair past shift: 3    Cardiac:   Cardiac Monitoring: Yes      Cardiac Rhythm: Normal sinus rhythm    Access:   Current line(s): PIV     Genitourinary:   Urinary status: voiding    Respiratory:   O2 Device: Room air  Chronic home O2 use?: NO  Incentive spirometer at bedside: YES  Actual Volume (ml): 1750 ml  GI:  Last Bowel Movement Date: 12/07/20  Current diet:  DIET CARDIAC Regular  Passing flatus: YES  Tolerating current diet: YES  % Diet Eaten: 50 %    Pain Management:   Patient states pain is manageable on current regimen: YES    Skin:  Steven Score: 20  Interventions: PT/OT consult    Patient Safety:  Fall Score:  Total Score: 3  Interventions: gripper socks and pt to call before getting OOB  High Fall Risk: Yes    Length of Stay:  Expected LOS: 6d 0h  Actual LOS: [place], RN

## 2020-12-10 NOTE — CARDIO/PULMONARY
Cardiac Rehab Note: chart review Consult has been acknowledged AVR 12/8/20 Smoking history assessed. Patient is a non smoker. Smoking Cessation Program link has not been added to the AVS. EF 55%  on 12/8/20 per [facility] Valve educational folder with \"Cardiac Surgery Post-Op Instructions\" book, heart healthy eating, warning signs, heart facts, heart aware, resources, and Valve Surgery booklet to Devin Perez MIREYA on 12/10/20 Educated using teach back method. Reviewed the use of bear for sternal support, daily weight and temperature monitoring, showering restrictions, signs and symptoms of infection at surgery sites, daily walking and arm exercises, and use of incentive spirometer. Reviewed risk factors for CAD to include the following: family history, elevated BMI, hyperlipidemia, hypertension, diabetes, stress, and smoking. Discussed Heart Healthy/Low Sodium (less than 2000 mg.) diet. Emphasized the value of cardiac rehab. Discussed Cardiac Rehab Program format, benefits, and encouraged enrollment to assist with risk modification and management. Initial Cardiac Rehab Program intake appointment scheduled for 1/7/20 and appointment information is on the AVS. Patient provided orientation packet, instructed to complete packet a couple days prior to appointment, wear gym appropriate clothing and shoes, and bring current list of medications. Patient is to call if unable to keep appointment, need to reschedule or additional questions. Spoke with nursing about patient requesting his \"restless leg medication now and that pharmacy called room requesting family to bring in narcolepsy medication from home, which is not available in hospital. Patient declined to do that because he does not need \"anything else to keep me from sleeping\". Nursing to follow up with patient and pharmacy. Dawit Stein verbalized understanding with questions answered.  [facility] will continue to follow for educational needs.

## 2020-12-10 NOTE — ROUTINE PROCESS
PCP [facility] apt scheduled with Dr. Almita Kerr on 12/22/20(earliest avail apt) at 2:00PM.  Apt added to AVS

## 2020-12-10 NOTE — PROGRESS NOTES
Problem: Falls - Risk of  Goal: *Absence of Falls  Description: Document Kait Meraz Fall Risk and appropriate interventions in the flowsheet.   Outcome: Progressing Towards Goal  Note: Fall Risk Interventions:  Mobility Interventions: Assess mobility with egress test, Bed/chair exit alarm, Communicate number of staff needed for ambulation/transfer, OT consult for ADLs, Patient to call before getting OOB, PT Consult for mobility concerns, PT Consult for assist device competence, Strengthening exercises (ROM-active/passive), Utilize walker, cane, or other assistive device, Utilize gait belt for transfers/ambulation    Mentation Interventions: Adequate sleep, hydration, pain control    Medication Interventions: Bed/chair exit alarm, Evaluate medications/consider consulting pharmacy    Elimination Interventions: Bed/chair exit alarm, Call light in reach, Patient to call for help with toileting needs, Toileting schedule/hourly rounds

## 2020-12-10 NOTE — PROGRESS NOTES
0600 ritalin dose rescheduled for 0800 to allow for dosing reevaluation during interdisciplinary rounds per MD

## 2020-12-10 NOTE — PROGRESS NOTES
Problem: Self Care Deficits Care Plan (Adult)  Goal: *Acute Goals and Plan of Care (Insert Text)  Description: FUNCTIONAL STATUS PRIOR TO ADMISSION: Patient was independent and active without use of DME.    HOME SUPPORT PRIOR TO ADMISSION: The patient lived with wife who can provide assist as needed. Occupational Therapy Goals:  Initiated 12/9/2020  1. Patient will perform grooming standing with item retrieval with modified independence within 7 days. 2. Patient will perform upper body dressing and lower body dressing with modified independence within 7 days. 3. Patient will perform toileting with modified independence within 7 days. 4. Patient will be independent with cardiac home exercise program within 7 days. 5. Patient will transfer from toilet with modified independence using the least restrictive device and appropriate durable medical equipment within 7 days. Outcome: Progressing Towards Goal   OCCUPATIONAL THERAPY TREATMENT  Patient: Chacorta Heano II (73 y.o. male)  Date: 12/10/2020  Diagnosis: Aortic stenosis, severe [I35.0]  Aortic stenosis [I35.0]   <principal problem not specified>  Procedure(s) (LRB):  AORTIC VALVE REPLACEMENT WITH DELNIDO (N/A) 2 Days Post-Op  Precautions: Sternal(Move in the tube)  Chart, occupational therapy assessment, plan of care, and goals were reviewed. ASSESSMENT  Patient continues with skilled OT services and is progressing towards goals. Pt was able to mobilize with nursing from his room in ICU to step down unit and has been to and from bathroom today with nursing. Upon arrival pt was in bathroom ringing for assist.  Pt was able to manage yaw care seated. Verbal cues needed for correct hand placement with sit to stand. Supervision overall for standing balance and standing ADLS. Pt was able to perform crossed leg technique for lower body dressing today with supervision. All vitals were stable.      Current Level of Function Impacting Discharge (ADLs): SBA ADL transfers   Grooming  Washing Hands: Supervision(standing at sink)    Obtained item from floor (flash light)  in standing with squatting technique and hand held assist SBA. Lower Body Dressing Assistance  Socks: Independent  Leg Crossed Method Used: Yes  Position Performed: Seated in chair    Toileting  Bladder Hygiene: Independent  Clothing Management: Supervision    Other factors to consider for discharge: none         PLAN :  Patient continues to benefit from skilled intervention to address the above impairments. Continue treatment per established plan of care. to address goals. Recommend with staff: to bathroom for ADLS and mobilizing in halls    Recommend next OT session: dressing    Recommendation for discharge: (in order for the patient to meet his/her long term goals)  No skilled occupational therapy/ follow up rehabilitation needs identified at this time. This discharge recommendation:  Has been made in collaboration with the attending provider and/or case management    IF patient discharges home will need the following DME: none       SUBJECTIVE:   Patient stated I want to walk.     OBJECTIVE DATA SUMMARY:   Cognitive/Behavioral Status:  Neurologic State: Alert  Orientation Level: Oriented X4  Cognition: Follows commands  Perception: Appears intact  Perseveration: No perseveration noted  Safety/Judgement: Fall prevention;Home safety    Functional Mobility and Transfers for ADLs:     Transfers:  Sit to Stand: Stand-by assistance  Functional Transfers  Bathroom Mobility: Stand-by assistance  Toilet Transfer : Stand-by assistance(verbal cues for hand placement)  Bed to Chair: Stand-by assistance    Balance:  Sitting: Intact; Without support  Standing: Without support; Impaired  Standing - Static: Good  Standing - Dynamic : Good    ADL Intervention:       Grooming  Washing Hands: Supervision(standing at sink)    Obtained item from floor (flash light)  in standing with squatting technique and hand held assist SBA. Lower Body Dressing Assistance  Socks: Independent  Leg Crossed Method Used: Yes  Position Performed: Seated in chair    Toileting  Bladder Hygiene: Independent  Clothing Management: Supervision    Cognitive Retraining  Safety/Judgement: Fall prevention;Home safety    Patient instructed and educated on mindful movement principles based on Move in The Tube concept to include maintaining bilateral elbows close to rib cage when performing any load-bearing activity such as getting in/out of bed, pushing up from a chair, opening a door, or lifting a box. Patient was given a handout with diagrams of each correct/incorrect method of performing each of the above tasks. Patient instructed on the ability to utilize upper extremities outside the tube when doing any non-load bearing activity such as washing hair/body, brushing teeth, retrieving clothing items, or scratching your back. Patient encouraged to also perform upper extremity exercises \"outside of the tube\" to prevent scar tissue formation around sternal incision site. Patient instructed in detail about activities to heed with caution, allowing pain to be the guide. These activities include but are not limited to: mowing the lawn, riding a bike, walking a dog, lifting a child, workshop hobbies, golfing, sexual activity, vacuuming, fishing, scrubbing the floors, and moving furniture. Patient was given the [address] in the [place] handout to describe each of these activities in detail. Patient instructed no asymmetrical reaching over head to ensure B UEs when shoulders >90* i.e. reaching in cabinets and dressing. Instruction on upper body dressing techniques of over head, then arms through to decrease pain and unilateral shoulder flexion >90*. Instruction on the benefits of utilizing B UEs during functional tasks i.e. opening the fridge, stepping into the tub.      Instruction if continued pain at home with shoulder IR for BM hygiene can use wet wipes and toilet tongs PRN. Avoid valsalva maneuvers. May have to adjust home setup to increase ease with items closer to waist height to prevent deep bending and the automatic  of asymmetrical UE WB/pushing for stabilization during bending. Benefit to don clothing tailor sitting and don all clothing while sitting prior to standing. Patient demonstrated lower body dressing with Supervision. Instruction and indicated understanding on the benefits of loose clothing throughout to accommodate for post surgical swelling, decreased ROM and increased pain. Instruction and indicated understanding the technique of pull over shirt versus front open clothing. Increase activity tolerance for home, work, and sexual intercourse by pacing self with increasing the arm exercises, sitting duration, frequency OOB, walking, standing, and ADLs. Instructed and indicated understanding of s/s of too much activity, how to respond to s/s safely. Therapeutic Exercises:   Patient instructed on the benefits and demonstrated cardiac exercises while standing with Supervision. Instructed and indicated understanding on how to progress reps, sets against gravity, pacing through progressive muscle strengthening standing based on surgeon clearance for more weight in prep for basic and instrumental ADLs. Instruction on the use of household items in place of weights as needed.     CARDIAC   EXERCISE    Sets    Reps    Active  Active Assist    Passive  Self ROM    Comments    Shoulder flexion  1  10   [x]                            []                             []                             []                                Shoulder abduction  1  10 [x]                             []                             []                             []                                Scapular elevation  1  10 [x]                             []                              []                             [] Scapular retraction  1  10 [x]                             []                             []                             []                                Trunk rotation  1  10  [x]                             []                             []                             []                                Trunk sidebending  1  10  [x]                             []                              []                             []                                            Activity Tolerance:   Good    After treatment patient left in no apparent distress:   Sitting in chair and Call bell within reach    COMMUNICATION/COLLABORATION:   The patients plan of care was discussed with: Physical therapist, Registered nurse, and patient .      Nora Robles OTR/L  Time Calculation: 29 mins

## 2020-12-10 NOTE — PROGRESS NOTES
Problem: Mobility Impaired (Adult and Pediatric)  Goal: *Acute Goals and Plan of Care (Insert Text)  Description: FUNCTIONAL STATUS PRIOR TO ADMISSION: Patient was independent and active without use of DME.     HOME SUPPORT PRIOR TO ADMISSION: The patient lived with his wife but did not require assist.    Physical Therapy Goals  Initiated 12/9/2020  1. Patient will move from supine to sit and sit to supine , scoot up and down, and roll side to side in bed with independence within 5 days. 2.  Patient will perform sit to/from stand with independence within 5 days. 3.  Patient will ambulate 250 feet with least restrictive assistive device and independence within 5 days. 4.  Patient will ascend/descend 5 stairs with one sided handrail(s) with modified independence within 5 days. 5.  Patient will perform cardiac exercises per protocol with modified independence within 5 days. 6.  Patient will verbally recall and functionally demonstrate mindful-based movements (\"move in the tube\") principles without cues within 5 days. Outcome: Progressing Towards Goal   PHYSICAL THERAPY TREATMENT  Patient: Raymond Garner II (20 y.o. male)  Date: 12/10/2020  Diagnosis: Aortic stenosis, severe [I35.0]  Aortic stenosis [I35.0]   <principal problem not specified>  Procedure(s) (LRB):  AORTIC VALVE REPLACEMENT WITH DELNIDO (N/A) 2 Days Post-Op  Precautions: Sternal(Move in the tube)  Chart, physical therapy assessment, plan of care and goals were reviewed. ASSESSMENT  Patient continues with skilled PT services and is progressing towards goals. Patient is very motivated and doing well with therapy. Patient required SBA-CGA for all mobility. He demonstrates good balance, fair with dynamic mobility. He ambulated 170 feet with CGA without AD. He demonstrates mild SOB although did not require rest breaks. Performed exercises in standing.       Patient is verbalizing and is demonstrating understanding of mindful-based movements (\"move in the tube\") principles of keeping UEs proximal to ribcage to prevent lateral pull on the sternum during load-bearing activities with visual and verbal cues required for compliance. Current Level of Function Impacting Discharge (mobility/balance): CGA-SBA    Other factors to consider for discharge: motivated, active         PLAN :  Patient continues to benefit from skilled intervention to address the above impairments. Continue treatment per established plan of care. to address goals. Recommendation for discharge: (in order for the patient to meet his/her long term goals)  HH PT vs OP PT    This discharge recommendation:  Has been made in collaboration with the attending provider and/or case management    IF patient discharges home will need the following DME: none       SUBJECTIVE:   Patient stated I want to make sure I am doing what I am supposed to be doing.     OBJECTIVE DATA SUMMARY:   Patient mobilized on continuous portable monitor/telemetry. Critical Behavior:  Neurologic State: Alert  Orientation Level: Oriented X4  Cognition: Follows commands  Safety/Judgement: Awareness of environment, Fall prevention, Home safety    Functional Mobility Training:  Bed Mobility:                      Transfers:  Sit to Stand: Stand-by assistance  Stand to Sit: Stand-by assistance        Bed to Chair: Stand-by assistance                      Balance:  Sitting: Intact; Without support  Standing: Without support; Impaired  Standing - Static: Good  Standing - Dynamic : Good    Ambulation/Gait Training:  Distance (ft): 170 Feet (ft)  Assistive Device: Gait belt  Ambulation - Level of Assistance: Stand-by assistance;Contact guard assistance        Gait Abnormalities: Decreased step clearance;Trunk sway increased        Base of Support: Widened     Speed/Ct: Pace decreased (<100 feet/min)  Step Length: Right shortened;Left shortened                      Cardiac diagnosis intervention:  Patient instructed and educated on mindful movement principles based on Move in The Tube concept to include maintaining bilateral elbows close to rib cage when performing any load-bearing activity such as getting in/out of bed, pushing up from a chair, opening a door, or lifting a box. Patient was given a handout with diagrams of each correct/incorrect method of performing each of the above tasks. Therapeutic Exercises:   Patient instructed on the benefits and demonstrated cardiac exercises while standing with Supervision. Instructed and indicated understanding on how to progress reps, sets against gravity, pacing through progressive muscle strengthening standing based on surgeon clearance for more weight in prep for basic and instrumental ADLs. Instruction on the use of household items in place of weights as needed.      CARDIAC  EXERCISE   Sets   Reps   Active Active Assist   Passive Self ROM   Comments   Shoulder flexion 1 5 [x]                                            []                                            []                                            []                                               Shoulder abduction 1      5 [x]                                            []                                            []                                            []                                               Scapular elevation 1 5 [x]                                            []                                            []                                            []                                               Scapular retraction 1 5 [x]                                            []                                            []                                            []                                               Trunk rotation 1 5 [x]                                            []                                            []                                            [] Trunk sidebending 1 5 [x]                                            []                                            []                                            []                                                  []                                            []                                            []                                            []                                                     Activity Tolerance:   Good and SpO2 stable on RA    After treatment patient left in no apparent distress:   Sitting in chair and Call bell within reach    COMMUNICATION/COLLABORATION:   The patients plan of care was discussed with: Occupational therapist, Registered nurse and Case management.      Leila Miranda, PT, DPT   Time Calculation: 26 mins

## 2020-12-10 NOTE — PROGRESS NOTES
Cardiac Surgery Care Coordinator- Met with Olga Aguilera II, reviewed plan of care and discussed potential discharge date. Reinforced sternal precautions and encouraged continued use of the incentive spirometer. Reviewed goals for the day and emphasized the importance of increased activity to meet discharge goals. Will continue to follow for educational and emotional needs.  Yoanna Dietz RN

## 2020-12-10 NOTE — PROGRESS NOTES
0700 Bedside and Verbal shift change report received from Teresa Dennison, [facility] (offgoing nurse). Report included the following information SBAR, Kardex, Intake/Output, MAR, Accordion and Recent Results. 0800 Assessment complete. See flowsheet for details. 0900 Patient back to bed X1 assist.    0915 Chest tubes removed by Chase Hernandez NP. No issues noted. Will monitor. 1045 TRANSFER - OUT REPORT:    Verbal report given to Nevin Turk RN (name) on [facility] being transferred to PCU (unit) for routine progression of care. Report consisted of patients Situation, Background, Assessment and Recommendations (SBAR). Information from the following report(s) SBAR, Kardex, Intake/Output, MAR, Accordion and Recent Results was reviewed with the receiving nurse. Lines:   Peripheral IV 12/08/20 Left; Inner Wrist (Active)   Site Assessment Clean, dry, & intact 12/10/20 0800   Phlebitis Assessment 0 12/10/20 0800   Infiltration Assessment 0 12/10/20 0800   Dressing Status Clean, dry, & intact 12/10/20 0800   Dressing Type Tape;Transparent 12/10/20 0800   Hub Color/Line Status Pink;Flushed; Infusing 12/10/20 0800   Action Taken Open ports on tubing capped 12/09/20 1500   Alcohol Cap Used Yes 12/10/20 0800       Peripheral IV 12/09/20 Right Antecubital (Active)   Site Assessment Clean, dry, & intact 12/10/20 0800   Phlebitis Assessment 0 12/10/20 0800   Infiltration Assessment 0 12/10/20 0800   Dressing Status Clean, dry, & intact 12/10/20 0800   Dressing Type Tape;Transparent 12/10/20 0800   Hub Color/Line Status Green;Capped 12/10/20 0800   Action Taken Open ports on tubing capped 12/09/20 1500   Alcohol Cap Used Yes 12/10/20 0800        Opportunity for questions and clarification was provided. Patient transported with RN, patient belongings.

## 2020-12-11 ENCOUNTER — HOME HEALTH ADMISSION (OUTPATIENT)
Dept: HOME HEALTH SERVICES | Facility: HOME HEALTH | Age: 60
End: 2020-12-11
Payer: COMMERCIAL

## 2020-12-11 ENCOUNTER — APPOINTMENT (OUTPATIENT)
Dept: GENERAL RADIOLOGY | Age: 60
DRG: 220 | End: 2020-12-11
Attending: PHYSICIAN ASSISTANT
Payer: COMMERCIAL

## 2020-12-11 LAB
ANION GAP SERPL CALC-SCNC: 2 MMOL/L (ref 5–15)
APTT PPP: 27.9 SEC (ref 22.1–31)
BUN SERPL-MCNC: 21 MG/DL (ref 6–20)
BUN/CREAT SERPL: 22 (ref 12–20)
CALCIUM SERPL-MCNC: 8.5 MG/DL (ref 8.5–10.1)
CHLORIDE SERPL-SCNC: 105 MMOL/L (ref 97–108)
CO2 SERPL-SCNC: 28 MMOL/L (ref 21–32)
CREAT SERPL-MCNC: 0.94 MG/DL (ref 0.7–1.3)
ERYTHROCYTE [DISTWIDTH] IN BLOOD BY AUTOMATED COUNT: 13.7 % (ref 11.5–14.5)
GLUCOSE BLD STRIP.AUTO-MCNC: 120 MG/DL (ref 65–100)
GLUCOSE BLD STRIP.AUTO-MCNC: 139 MG/DL (ref 65–100)
GLUCOSE SERPL-MCNC: 124 MG/DL (ref 65–100)
HCT VFR BLD AUTO: 31.6 % (ref 36.6–50.3)
HGB BLD-MCNC: 10.3 G/DL (ref 12.1–17)
INR PPP: 1 (ref 0.9–1.1)
MAGNESIUM SERPL-MCNC: 2.3 MG/DL (ref 1.6–2.4)
MCH RBC QN AUTO: 28.7 PG (ref 26–34)
MCHC RBC AUTO-ENTMCNC: 32.6 G/DL (ref 30–36.5)
MCV RBC AUTO: 88 FL (ref 80–99)
NRBC # BLD: 0 K/UL (ref 0–0.01)
NRBC BLD-RTO: 0 PER 100 WBC
PLATELET # BLD AUTO: 102 K/UL (ref 150–400)
PMV BLD AUTO: 11.3 FL (ref 8.9–12.9)
POTASSIUM SERPL-SCNC: 3.8 MMOL/L (ref 3.5–5.1)
PROTHROMBIN TIME: 10.3 SEC (ref 9–11.1)
RBC # BLD AUTO: 3.59 M/UL (ref 4.1–5.7)
SERVICE CMNT-IMP: ABNORMAL
SERVICE CMNT-IMP: ABNORMAL
SODIUM SERPL-SCNC: 135 MMOL/L (ref 136–145)
THERAPEUTIC RANGE,PTTT: NORMAL SECS (ref 58–77)
WBC # BLD AUTO: 12.3 K/UL (ref 4.1–11.1)

## 2020-12-11 PROCEDURE — 97110 THERAPEUTIC EXERCISES: CPT | Performed by: OCCUPATIONAL THERAPIST

## 2020-12-11 PROCEDURE — 85730 THROMBOPLASTIN TIME PARTIAL: CPT

## 2020-12-11 PROCEDURE — 82962 GLUCOSE BLOOD TEST: CPT

## 2020-12-11 PROCEDURE — 74011000250 HC RX REV CODE- 250: Performed by: PHYSICIAN ASSISTANT

## 2020-12-11 PROCEDURE — 80048 BASIC METABOLIC PNL TOTAL CA: CPT

## 2020-12-11 PROCEDURE — 74011250637 HC RX REV CODE- 250/637: Performed by: NURSE PRACTITIONER

## 2020-12-11 PROCEDURE — 74011250637 HC RX REV CODE- 250/637: Performed by: PHYSICIAN ASSISTANT

## 2020-12-11 PROCEDURE — 36415 COLL VENOUS BLD VENIPUNCTURE: CPT

## 2020-12-11 PROCEDURE — 97535 SELF CARE MNGMENT TRAINING: CPT | Performed by: OCCUPATIONAL THERAPIST

## 2020-12-11 PROCEDURE — 85610 PROTHROMBIN TIME: CPT

## 2020-12-11 PROCEDURE — 85027 COMPLETE CBC AUTOMATED: CPT

## 2020-12-11 PROCEDURE — 74011000258 HC RX REV CODE- 258: Performed by: THORACIC SURGERY (CARDIOTHORACIC VASCULAR SURGERY)

## 2020-12-11 PROCEDURE — 83735 ASSAY OF MAGNESIUM: CPT

## 2020-12-11 PROCEDURE — 74011250636 HC RX REV CODE- 250/636: Performed by: PHYSICIAN ASSISTANT

## 2020-12-11 PROCEDURE — 74011250637 HC RX REV CODE- 250/637: Performed by: THORACIC SURGERY (CARDIOTHORACIC VASCULAR SURGERY)

## 2020-12-11 PROCEDURE — 71045 X-RAY EXAM CHEST 1 VIEW: CPT

## 2020-12-11 PROCEDURE — 65660000000 HC RM CCU STEPDOWN

## 2020-12-11 PROCEDURE — 97116 GAIT TRAINING THERAPY: CPT

## 2020-12-11 PROCEDURE — 74011250636 HC RX REV CODE- 250/636: Performed by: THORACIC SURGERY (CARDIOTHORACIC VASCULAR SURGERY)

## 2020-12-11 RX ORDER — TAMSULOSIN HYDROCHLORIDE 0.4 MG/1
0.4 CAPSULE ORAL DAILY
Qty: 30 CAP | Refills: 1 | Status: SHIPPED | OUTPATIENT
Start: 2020-12-12 | End: 2021-01-04

## 2020-12-11 RX ORDER — AMOXICILLIN 250 MG
1 CAPSULE ORAL 2 TIMES DAILY
Qty: 60 TAB | Refills: 0 | Status: SHIPPED | OUTPATIENT
Start: 2020-12-11 | End: 2021-01-07 | Stop reason: ALTCHOICE

## 2020-12-11 RX ORDER — CLOPIDOGREL BISULFATE 75 MG/1
75 TABLET ORAL DAILY
Qty: 30 TAB | Refills: 1 | Status: SHIPPED | OUTPATIENT
Start: 2020-12-12 | End: 2021-01-04

## 2020-12-11 RX ORDER — METOPROLOL TARTRATE 25 MG/1
12.5 TABLET, FILM COATED ORAL 2 TIMES DAILY
Qty: 30 TAB | Refills: 1 | Status: SHIPPED | OUTPATIENT
Start: 2020-12-11 | End: 2021-01-04

## 2020-12-11 RX ORDER — OXYCODONE HYDROCHLORIDE 5 MG/1
5 TABLET ORAL
Qty: 40 TAB | Refills: 0 | Status: SHIPPED | OUTPATIENT
Start: 2020-12-11 | End: 2020-12-18

## 2020-12-11 RX ORDER — FUROSEMIDE 20 MG/1
20 TABLET ORAL DAILY
Qty: 7 TAB | Refills: 0 | Status: SHIPPED | OUTPATIENT
Start: 2020-12-11 | End: 2020-12-17 | Stop reason: SDUPTHER

## 2020-12-11 RX ORDER — POTASSIUM CHLORIDE 1500 MG/1
20 TABLET, FILM COATED, EXTENDED RELEASE ORAL DAILY
Qty: 7 TAB | Refills: 0 | Status: SHIPPED | OUTPATIENT
Start: 2020-12-12 | End: 2020-12-17 | Stop reason: SDUPTHER

## 2020-12-11 RX ORDER — GUAIFENESIN 100 MG/5ML
81 LIQUID (ML) ORAL DAILY
Qty: 30 TAB | Refills: 1 | Status: SHIPPED | OUTPATIENT
Start: 2020-12-12 | End: 2021-01-04

## 2020-12-11 RX ORDER — AMIODARONE HYDROCHLORIDE 200 MG/1
TABLET ORAL
Qty: 84 TAB | Refills: 0 | Status: SHIPPED | OUTPATIENT
Start: 2020-12-11

## 2020-12-11 RX ORDER — ACETAMINOPHEN 325 MG/1
TABLET ORAL
Status: DISPENSED
Start: 2020-12-11 | End: 2020-12-12

## 2020-12-11 RX ORDER — POLYETHYLENE GLYCOL 3350 17 G/17G
17 POWDER, FOR SOLUTION ORAL DAILY
Qty: 14 PACKET | Refills: 0 | Status: SHIPPED | OUTPATIENT
Start: 2020-12-12 | End: 2021-01-07 | Stop reason: ALTCHOICE

## 2020-12-11 RX ORDER — AMIODARONE HYDROCHLORIDE 200 MG/1
400 TABLET ORAL 2 TIMES DAILY
Status: DISCONTINUED | OUTPATIENT
Start: 2020-12-11 | End: 2020-12-12 | Stop reason: HOSPADM

## 2020-12-11 RX ORDER — ACETAMINOPHEN 325 MG/1
650 TABLET ORAL
Status: DISCONTINUED | OUTPATIENT
Start: 2020-12-11 | End: 2020-12-12 | Stop reason: HOSPADM

## 2020-12-11 RX ORDER — FUROSEMIDE 20 MG/1
20 TABLET ORAL DAILY
Status: DISCONTINUED | OUTPATIENT
Start: 2020-12-11 | End: 2020-12-12 | Stop reason: HOSPADM

## 2020-12-11 RX ADMIN — FUROSEMIDE 20 MG: 20 TABLET ORAL at 09:26

## 2020-12-11 RX ADMIN — MAGNESIUM OXIDE TAB 400 MG (241.3 MG ELEMENTAL MG) 400 MG: 400 (241.3 MG) TAB at 17:53

## 2020-12-11 RX ADMIN — PRAMIPEXOLE DIHYDROCHLORIDE 0.12 MG: 0.25 TABLET ORAL at 14:43

## 2020-12-11 RX ADMIN — METOPROLOL TARTRATE 12.5 MG: 25 TABLET, FILM COATED ORAL at 17:53

## 2020-12-11 RX ADMIN — Medication 10 ML: at 14:42

## 2020-12-11 RX ADMIN — PRAMIPEXOLE DIHYDROCHLORIDE 0.5 MG: 1 TABLET ORAL at 20:37

## 2020-12-11 RX ADMIN — POLYETHYLENE GLYCOL 3350 17 G: 17 POWDER, FOR SOLUTION ORAL at 09:25

## 2020-12-11 RX ADMIN — TAMSULOSIN HYDROCHLORIDE 0.4 MG: 0.4 CAPSULE ORAL at 09:25

## 2020-12-11 RX ADMIN — PANTOPRAZOLE SODIUM 40 MG: 40 TABLET, DELAYED RELEASE ORAL at 09:25

## 2020-12-11 RX ADMIN — ASPIRIN 81 MG CHEWABLE TABLET 81 MG: 81 TABLET CHEWABLE at 09:26

## 2020-12-11 RX ADMIN — AMIODARONE HYDROCHLORIDE 150 MG: 1.5 INJECTION, SOLUTION INTRAVENOUS at 03:40

## 2020-12-11 RX ADMIN — METHYLPHENIDATE HYDROCHLORIDE 5 MG: 5 TABLET ORAL at 09:25

## 2020-12-11 RX ADMIN — MUPIROCIN: 20 OINTMENT TOPICAL at 20:40

## 2020-12-11 RX ADMIN — Medication 5 TABLET: at 09:25

## 2020-12-11 RX ADMIN — DOCUSATE SODIUM - SENNOSIDES 1 TABLET: 50; 8.6 TABLET, FILM COATED ORAL at 17:53

## 2020-12-11 RX ADMIN — THERA TABS 1 TABLET: TAB at 09:26

## 2020-12-11 RX ADMIN — ROSUVASTATIN 20 MG: 20 TABLET, FILM COATED ORAL at 20:39

## 2020-12-11 RX ADMIN — Medication 10 ML: at 06:00

## 2020-12-11 RX ADMIN — OXYCODONE HYDROCHLORIDE 5 MG: 5 TABLET ORAL at 20:38

## 2020-12-11 RX ADMIN — ACETAMINOPHEN 650 MG: 325 TABLET ORAL at 20:43

## 2020-12-11 RX ADMIN — MAGNESIUM OXIDE TAB 400 MG (241.3 MG ELEMENTAL MG) 400 MG: 400 (241.3 MG) TAB at 09:25

## 2020-12-11 RX ADMIN — ENOXAPARIN SODIUM 40 MG: 40 INJECTION SUBCUTANEOUS at 09:27

## 2020-12-11 RX ADMIN — MONTELUKAST SODIUM 10 MG: 10 TABLET ORAL at 09:25

## 2020-12-11 RX ADMIN — Medication 10 ML: at 20:39

## 2020-12-11 RX ADMIN — CETIRIZINE HYDROCHLORIDE 10 MG: 10 TABLET, FILM COATED ORAL at 09:26

## 2020-12-11 RX ADMIN — CLOPIDOGREL BISULFATE 75 MG: 75 TABLET ORAL at 09:26

## 2020-12-11 RX ADMIN — CHLORHEXIDINE GLUCONATE 0.12% ORAL RINSE 10 ML: 1.2 LIQUID ORAL at 20:38

## 2020-12-11 RX ADMIN — OXYCODONE HYDROCHLORIDE 5 MG: 5 TABLET ORAL at 03:50

## 2020-12-11 RX ADMIN — DOCUSATE SODIUM - SENNOSIDES 1 TABLET: 50; 8.6 TABLET, FILM COATED ORAL at 09:26

## 2020-12-11 RX ADMIN — METOPROLOL TARTRATE 12.5 MG: 25 TABLET, FILM COATED ORAL at 09:26

## 2020-12-11 RX ADMIN — MUPIROCIN: 20 OINTMENT TOPICAL at 09:27

## 2020-12-11 RX ADMIN — POTASSIUM CHLORIDE 20 MEQ: 750 TABLET, FILM COATED, EXTENDED RELEASE ORAL at 09:26

## 2020-12-11 RX ADMIN — AMIODARONE HYDROCHLORIDE 400 MG: 200 TABLET ORAL at 17:52

## 2020-12-11 RX ADMIN — AMIODARONE HYDROCHLORIDE 400 MG: 200 TABLET ORAL at 09:25

## 2020-12-11 RX ADMIN — AMIODARONE HYDROCHLORIDE 1 MG/MIN: 50 INJECTION, SOLUTION INTRAVENOUS at 04:12

## 2020-12-11 RX ADMIN — CHLORHEXIDINE GLUCONATE 0.12% ORAL RINSE 10 ML: 1.2 LIQUID ORAL at 09:25

## 2020-12-11 NOTE — DIABETES MGMT
[facility] [facility]    CLINICAL NURSE SPECIALIST CONSULT   INITIAL NOTE    Presentation   Mickey Urrutia is a 63 y.o. male admitted for scheduled surgical intervention 12/8/20, due to fatigue and dyspnea for several months. Accompanying symptoms include chest pain, palpitations, lightheadedness and syncope. HX:   Past Medical History:   Diagnosis Date    Adverse effect of anesthesia     took long time to wake up    Arthritis     hands    Asthma     Hypercholesterolemia     Hypertension     Ill-defined condition     \"Idiopathic hypersomnia\"    Ill-defined condition     restless leg syndrome    Ill-defined condition     SEVERE AORTIC STENOSIS    Lumbar herniated disc     Sleep apnea     use cpap     DX: AVR    TX: 12/8/20 AORTIC VALVE REPLACEMENT (23 mm Inspiris). Lucila Mcmanus Current clinical course has been uncomplicated. 12/10/20 On room air. CT +. Less nausea per patient report. Used 11.2 units of insulin 12/8/20. Used 15.1 units of insulin 12/9/20.   12/11/20 working with PT and OT. CM working with family and recommending home with HH. Converted into afib over night and amio bolus then infusion started. Converted to NSR this morning. CXR this morning showed unchanged cardiomegaly. Mild pulmonary edema. Per CTS likely can go home tomorrow. Patient did not have diabetes PTA. Family history of diabetes positive in maternal grandmother. Admission BG 96 and A1c 5.7% confirm non-diabetic state. Consulted by Provider for advanced diabetes nursing assessment and care, specifically related to   [x] Transitioning off Glucostabilizer     Subjective   I used to be able to walk 45 minutes without a problem and recently I could only do 15 minutes.     Patient reports the following home self-care practices:  Eating pattern  [x] Breakfast Hot or cold cereal. Sometimes eggs. [x] Lunch  [place]  [x] Dinner  \"More pasta\" than he should. Fish, chicken & ground turkey. Vegetables. [x] Beverages Water  Physical activity pattern - Used to walk 45 minutes every day until current symptoms began developing six months ago. Works in his yard. Always \"busy. \"  [x] Non-Aerobic exercise      Objective   Physical exam  General Alert, oriented and in no acute distress. Conversant and cooperative. Vital Signs Afebrile. NSR. Normotensive. RA  Visit Vitals  BP (!) 121/53   Pulse 87   Temp 98.2 °F (36.8 °C)   Resp 18   Ht 6' (1.829 m)   Wt 104.7 kg (230 lb 13.2 oz)   SpO2 96%   BMI 31.31 kg/m²      Laboratory  Tests 12/11 12/10 12/9 12/8   A1c        108 98 130   Anion gap 2 2 3 5   Serum triglycerides       WBC 12.3 14.2 9.6 13.9   Serum creatinine 0.94 1.05 0.74 0.9   GFR >60 >60 >60 >60   AST  38 36 33   ALT  28 30 34     12/10/20 CXR: Diminished lung volumes with minimal bibasilar atelectasis. Factors affecting BG management  Factor Dose Comments   Nutrition:  Cardiac Regular meals          States he is eating well today and has had no episodes of nausea. Pain PRN oxycodone  Rated 0-4. Infection  Afebrile. WBC 14.2==> down to 12.3     Blood glucose pattern        Assessment and Plan   Nursing Diagnosis Risk for unstable blood glucose pattern   Nursing Intervention Domain 4784 Decision-making Support   Nursing Interventions Examined current inpatient diabetes control   Explored factors facilitating and impeding inpatient management  Identified self-management practices impeding diabetes control     Evaluation   This  male did not have diabetes PTA. This is confirmed by admission BG of 96 and A1c of 5.7%. Forty pound weight gain puts him at risk for the development of Type 2 diabetes with his genetic risk. He is eager to get his energy back so he can return to his usual PA levels. During this hospitalization, the patient has easily achieved inpatient blood glucose target of 100-180mg/dl.  He was transitioned off appropriately per protocol yesterday afternoon with 16 units of Lantus. Since then BG have been 120-130s. He was on an amio gtt over night which has dextrose since he converted to afib but this has been discontinued. No need for further diabetes management or interventions at this time. Recommendations   Recommend:    Add normal sensitivity corrective scale while inpatient    Encourage physical activity as tolerated and directed by CTS    Billing Code(s)   I personally reviewed medical record, including notes, data and current medications, and examined the patient at bedside before making care recommendations.      [] [id number] IP subsequent hospital care - 35 minutes [] [id number] Prolonged Services - 65 minutes [] [id number] Prolonged Services - 110 minutes  [x] [id number] IP subsequent hospital care - 25 minutes [] [id number] Prolonged Services - 55 minutes [] [id number] Prolonged Services - 100 minutes  [] [id number] IP subsequent hospital care - 15 minutes [] [id number] Prolonged Services - 45 minutes [] [id number] Prolonged Services - 90 minutes    Brandon Bunch MSN,RN, ACCNS-  Brandon Bunch, CNS  Diabetes Clinical Nurse Specialist  [facility]  Access via [address] [place] [address]

## 2020-12-11 NOTE — PROGRESS NOTES
[facility]  Progress Note    Admit Date: 2020  POD:  3 Day Post-Op    Procedure:  Procedure(s):  AORTIC VALVE REPLACEMENT WITH MARILIN        Subjective/24 Hour Summary:   Pt seen with Dr. Carey Lala. Afebrile, on RA. Up in chair. Had afib with RVR overnight, received amio bolus and gtt. NSR this morning. Objective:   Vitals:  Blood pressure (!) 121/53, pulse 87, temperature 98.2 °F (36.8 °C), resp. rate 18, height 6' (1.829 m), weight 230 lb 13.2 oz (104.7 kg), SpO2 96 %. Temp (24hrs), Av.2 °F (36.8 °C), Min:97.6 °F (36.4 °C), Max:99.2 °F (37.3 °C)    EKG/Rhythm:  SR      Oxygen Therapy:  Oxygen Therapy  O2 Sat (%): 96 % (20)  Pulse via Oximetry: 139 beats per minute (20)  O2 Device: Room air (20)  O2 Flow Rate (L/min): 3 l/min (20)  O2 Temperature: 35.6 °F (2 °C) (20)  FIO2 (%): 40 % (20)    CXR:  CXR Results  (Last 48 hours)               20 0611  XR CHEST PORT Final result    Impression:  IMPRESSION: Unchanged cardiomegaly. Mild pulmonary edema. Narrative:  INDICATION: Postop heart surgery       COMPARISON: 12/10/2020       FINDINGS: AP portable imaging of the chest performed at 5:29 AM demonstrates   unchanged cardiomegaly. The patient is status post median sternotomy and aortic   valve replacement. There is mild pulmonary edema. No new airspace disease or   significant pleural effusion is evident. No significant osseous abnormalities   are seen. 12/10/20 0545  XR CHEST PORT Final result    Impression:  IMPRESSION: Diminished lung volumes with minimal bibasilar atelectasis. Narrative:  INDICATION: Postop heart surgery       COMPARISON: 2020       FINDINGS: AP portable imaging of the chest performed at 5:17 AM demonstrates   unchanged cardiomegaly. The patient is status post median sternotomy and aortic   valve replacement. Lung volumes are diminished.  There is minimal bibasilar atelectasis. No significant osseous abnormalities are seen. Admission Weight: Last Weight   Weight: 211 lb 3.2 oz (95.8 kg) Weight: 230 lb 13.2 oz (104.7 kg)     Intake / Output / Drain:  Current Shift: 701 - 1900  In: 480 [P.O.:480]  Out: -   Last 24 hrs.:     Intake/Output Summary (Last 24 hours) at 2020 1031  Last data filed at 2020 0938  Gross per 24 hour   Intake 600 ml   Output 3250 ml   Net -2650 ml       EXAM:  General:   NAD                                                                                           Lungs:   Clear upper diminished bases to auscultation bilaterally. Incision:  No erythema,drainage, or swelling   Heart:  Regular rate and rhythm, S1, S2 normal, no murmur, click, rub or gallop. Abdomen:   Soft, non-tender. Bowel sounds hypoactive. No masses,  No organomegaly. Extremities:  No edema. PPP. Neurologic:  Gross motor and sensory apparatus intact. Labs:   Recent Labs     12/11/20  0342 12/10/20  0319   WBC 12.3* 14.2*   HGB 10.3* 10.1*   HCT 31.6* 31.9*   * 114*     Recent Labs     202 12/10/20  031   * 138   K 3.8 4.1    109*   CO2 28 27   BUN 21* 25*   CREA 0.94 1.05   * 108*   CA 8.5 8.2*   MG 2.3 2.4     Recent Labs     12/10/20  0319 12/09/20  0314   AP 35* 41*   TP 5.6* 5.8*   ALB 3.5 3.9   GLOB 2.1 1.9*     Recent Labs     20  1817   INR 1.0 1.2*   PTP 10.3 12.1*   APTT 27.9 24.6      No results for input(s): PHI, PCO2I, PO2I, FIO2I in the last 72 hours. No results for input(s): CPK, CKMB, TROIQ, BNPP in the last 72 hours. Assessment:     Active Problems: Aortic stenosis (10/27/2020)      Aortic stenosis, severe (2020)      S/P AVR (aortic valve replacement) (2020)         Plan/Recommendations/Medical Decision Makin. Aortic Stenosis s/p tissue AVR: ASA/Plavix per Dr. Kendall Capellan.      2. CAD -Minimal by cath, ASA, Statin, on metop.      3. Bilateral Carotid Artery Stenosis - Mild     4. Dyslipidemia - on statin     5. Asthma/ZAKIA/RLS - CPAP machine, sees    Singulair, Zyrtec and Mirapex     6. Hypertension - no antihypertensives until appropriate.     7. Hypersomnia - on ritalin and sunosi PTA. Cont lower dose of ritalin today -pt reports on POD1 when he took 1 dose of 40 mg he felt his heart racing (normally takes 40 mg QID). 8. Acute post op respiratory insufficiency: On Ra now.  TCDB. IS, progressive ambulation. Give 20 PO lasix today. 9. Acute blood loss anemia: monitor HH    10. Nausea: much improved. 11. PAF: in NSR this morning, had afib with RVR overnight. Start PO amio, stop amio gtt. Monitor rhythm. 12. Prophylaxis: lovenox, Protonix. Dispo: PT/OT, likely home tomorrow.      Signed By: Senait Byrd NP

## 2020-12-11 NOTE — PROGRESS NOTES
Peter Plan:    D/C Home with [facility]- [address] [place] [address]   F/U appointment- PCP-Dr. Cesar Vásquez. Violeta Valencia- 12/22/20 at 2pm.  Cardiac Surgery-Dr. Tami Elmore- 2/18/20 at 10:30AM, 1/7/21 at 2:45PM   Wife to provide transportation at d/c    Plan to d/c the  Weekend. [address] [place] [address] has accepted pt for [facility] services. CM discussed d/c plan with pt. Pt informed of [facility] provider.          [address]  Phone: [phone]

## 2020-12-11 NOTE — ED NOTES
0715 Bedside shift change report given to Khadar (oncoming nurse) by Deann Estrada (offgoing nurse). Report included the following information SBAR.     0930 Therapy at bedside. 1700 Patient is not compliant with fluid restriction. She continues to ask different employees for something to drink. Attempt to educate, but unsuccessful. 1932  End of Shift Note    Bedside shift change report given to Deann Estrada (oncoming nurse) by Jake Miller RN (offgoing nurse). Report included the following information SBAR    Shift worked:  7a-7p     Shift summary and any significant changes:     NO   Concerns for physician to address: No     Zone phone for oncoming shift:   [phone]       Activity:  Activity Level: Up with Assistance  Number times ambulated in hallways past shift: 2  Number of times OOB to chair past shift: 2    Cardiac:   Cardiac Monitoring: Yes      Cardiac Rhythm: Atrial fibrillation    Access:   Current line(s): PIV     Genitourinary:   Urinary status: voiding    Respiratory:   O2 Device: Room air  Chronic home O2 use?: NO  Incentive spirometer at bedside: YES  Actual Volume (ml): 1500 ml  GI:  Last Bowel Movement Date: 12/07/20  Current diet:  DIET CARDIAC Regular  Passing flatus: YES  Tolerating current diet: YES  % Diet Eaten: 100 %    Pain Management:   Patient states pain is manageable on current regimen: YES    Skin:  Steven Score: 20  Interventions: increase time out of bed    Patient Safety:  Fall Score:  Total Score: 3  Interventions: gripper socks  High Fall Risk: Yes    Length of Stay:  Expected LOS: 6d 0h  Actual LOS: [address], [place]

## 2020-12-11 NOTE — DISCHARGE SUMMARY
[facility] Discharge Summary     Patient ID:  Jamie Melo  [id number]  63 y.o.  1960    Admit date: 12/8/2020    Discharge date: 12/12/20    Admitting Physician: Kristyn Carreon MD     Referring Cardiologist:  Yakov    PCP:  Bull Crowley MD    Admitting Diagnoses: AS    Discharge Diagnoses:     Hospital Problems  Date Reviewed: 12/8/2020          Codes Class Noted POA    Aortic stenosis, severe ICD-10-CM: I35.0  ICD-9-CM: 424.1  12/8/2020 Unknown        S/P AVR (aortic valve replacement) ICD-10-CM: Z95.2  ICD-9-CM: V43.3  12/8/2020 Unknown        Aortic stenosis ICD-10-CM: I35.0  ICD-9-CM: 424.1  10/27/2020 Yes              Discharged Condition: good    Disposition: home, see patient instructions for treatment and plan    Procedures for this admission:  Procedure(s):  AORTIC VALVE REPLACEMENT WITH DELNIDO    Discharge Medications:      My Medications      START taking these medications      Instructions Each Dose to Equal Morning Noon Evening Bedtime   aspirin 81 mg chewable tablet  Replaces:  aspirin 325 mg tablet  Your next dose is:  12/13/20      Take 1 Tab by mouth daily. 81 mg         clopidogreL 75 mg Tab  Commonly known as:  PLAVIX  Your next dose is:  12/13/20      Take 1 Tab by mouth daily. 75 mg         furosemide 20 mg tablet  Commonly known as:  LASIX  Your next dose is:  12/13/20      Take 1 Tab by mouth daily for 7 days. 20 mg         metoprolol tartrate 25 mg tablet  Commonly known as:  LOPRESSOR  Your next dose is:  12/12/20 bedtime      Take 0.5 Tabs by mouth two (2) times a day. 12.5 mg         oxyCODONE IR 5 mg immediate release tablet  Commonly known as:  ROXICODONE      Take 1 Tab by mouth every four (4) hours as needed for Pain for up to 7 days. Max Daily Amount: 30 mg.   5 mg         polyethylene glycol 17 gram packet  Commonly known as:  MIRALAX  Your next dose is:  12/13/20      Take 1 Packet by mouth daily.    17 g         potassium chloride SR 20 mEq tablet  Commonly known as:  K-TAB  Your next dose is:  12/13/20      Take 1 Tab by mouth daily for 7 days. 20 mEq         senna-docusate 8.6-50 mg per tablet  Commonly known as:  PERICOLACE  Your next dose is:  12/12/20 bedtime      Take 1 Tab by mouth two (2) times a day. 1 Tab         tamsulosin 0.4 mg capsule  Commonly known as:  FLOMAX  Your next dose is:  12/13/20      Take 1 Cap by mouth daily. 0.4 mg            CHANGE how you take these medications      Instructions Each Dose to Equal Morning Noon Evening Bedtime   amiodarone 200 mg tablet  Commonly known as:  CORDARONE  What changed:    · how much to take  · how to take this  · when to take this  · additional instructions  Your next dose is:  12/12/20 bedtime      Take 400 mg by mouth twice daily for 2 weeks. Then take 400 mg by mouth daily for 2 weeks. Then you are done with this medication. CONTINUE taking these medications      Instructions Each Dose to Equal Morning Noon Evening Bedtime   Cetirizine 10 mg Cap  Your next dose is:  12/13/20      Take 1 Tab by mouth daily. 1 Tab         cpap machine kit      by Does Not Apply route. * Mirapex 0.125 mg tablet  Generic drug:  pramipexole  Your next dose is:  12/12/20      Take 0.125 mg by mouth daily (after lunch). 2 pm   0.125 mg         * Mirapex 0.5 mg tablet  Generic drug:  pramipexole  Your next dose is:  12/12/20      Take 0.5 mg by mouth every evening. 8 pm   0.5 mg         multivitamin tablet  Commonly known as:  ONE A DAY  Your next dose is:  12/13/20      Take 1 Tab by mouth daily. 1 Tab         Ritalin 20 mg tablet  Generic drug:  methylphenidate HCl      Take 20 mg by mouth four (4) times daily. 6 am, 10 am, 2 pm, and 6 pm   NOT TAKING   20 mg         rosuvastatin 20 mg tablet  Commonly known as:  CRESTOR  Your next dose is:  12/12/20 bedtime      Take 20 mg by mouth nightly.    20 mg         Singulair 10 mg tablet  Generic drug:  montelukast  Your next dose is:  12/13/20      Take 10 mg by mouth daily. 10 mg         Sunosi 150 mg Tab  Generic drug:  solriamfetoL      Take 1 Tab by mouth daily. NOT TAKING   1 Tab         VITAMIN D3 PO  Your next dose is:  12/13/20      Take 5,000 Units by mouth daily. 5,000 Units             * This list has 2 medication(s) that are the same as other medications prescribed for you. Read the directions carefully, and ask your doctor or other care provider to review them with you. STOP taking these medications    aspirin 325 mg tablet  Commonly known as:  ASPIRIN  Replaced by:  aspirin 81 mg chewable tablet        chlorhexidine 0.12 % solution  Commonly known as:  PERIDEX        lisinopril-hydroCHLOROthiazide 10-12.5 mg per tablet  Commonly known as:  PRINZIDE, ZESTORETIC        mupirocin 2 % ointment  Commonly known as:  BACTROBAN              Where to Get Your Medications      These medications were sent to [facility]/pharmacy #[id number] - [place], [address] [address]  [address]  [place] [address]    Phone:  [phone]   · amiodarone 200 mg tablet  · aspirin 81 mg chewable tablet  · clopidogreL 75 mg Tab  · furosemide 20 mg tablet  · metoprolol tartrate 25 mg tablet  · oxyCODONE IR 5 mg immediate release tablet  · polyethylene glycol 17 gram packet  · potassium chloride SR 20 mEq tablet  · senna-docusate 8.6-50 mg per tablet  · tamsulosin 0.4 mg capsule           HPI: 59 y.o. male with PMHx of AS,CAD, Hypertension, Dyslipidemia, Mild bilateral Carotid Artery Stenosis, Hypersomnia on Adderal,Asthma, ZAKIA/RLS on CPAP and previous back surgery that is referred to the [address] [place] [address] by Dr. Kodi Pearce interventional evaluation of his severe aortic stenosis.     Mr. Kathy Beltran is  and has four adult children. He is a retired .      He has no history of tobacco or alcohol abuse. His family history includes both mother and father with MI and Hypertension     He has been experiencing fatigue and AGUIRRE. He denies chest pain, palpitations, lightheadedness, and syncope. He recently had a treadmill stress test which demonstrated adequate exercise tolerance and increased shortness of breath. He also had noted ST depression.      He has followed with a pulmonologist/Sleep Study physician, Dr. Renata Liriano for over 10 years. His wife and the patient describe significant asthma/ZAKIA. He is compliant with his CPAP. He doesn't recall having PFTs in the recent years. He does have a significant history of asthma exacerbations in childhood.     I attempted to call Dr. Renata Liriano but his office is closed and they are closed on Fridays. The patient will discuss this on Monday with Dr. Maureen Carter office and see if we can connect.     Today he underwent cardiac cath which revealed mild CAD and severe AS. We have been asked to render a surgical opinion.     Update: Pt was seen by Dr. Hao Coles from [facility] for abnormal chest CT. He had a bronch and cultures sent. Cleared by pulmonary for surgery.      Cardiac Testing     Cardiac catheterization: Diagnostic   Dominance: Right   Left Main    The vessel is large. The vessel is angiographically normal.    Left Anterior Descending    The vessel is large. The vessel exhibits minimal luminal irregularities. 2 medium diagonals; Short LM; LAD best imaged with JL3. Ramus Intermedius    The vessel is angiographically normal. Trivial vessel. Left Circumflex    The vessel is large. The vessel exhibits minimal luminal irregularities. Medium OM1; large OM2; trivial terminal OM. Right Coronary Artery    The vessel is moderate in size. The vessel is angiographically normal. Anterior take-off: imaged with ARmod            ECHO: 6-  LVEF 70%, Severe AS, Mild AI, LAURA not documented, AV Mean Gradient 39 mmHg and AV Velocity is 3.9 m/s, Mild LVH, Pap 29      Hospital Course: The patient underwent an AVR by Dr. Lourdes Moore on 12/8/20 -see op note for details.  He was transferred to the ICU in stable condition on amiodarone, precedex, nataliia gtts. He was extubated at 12/8/20 at 2146 hrs. He was hemodynamically stable and had transfer orders to stepdown on POD1. After medical optimization and working with PT he is being discharged home. 1. Aortic Stenosis s/p tissue AVR: ASA/Plavix per Dr. Shantelle Tai.      2. CAD -Minimal by cath, ASA, Statin, on metop.      3. Bilateral Carotid Artery Stenosis - Mild     4. Dyslipidemia - on statin     5. Asthma/ZAKIA/RLS - CPAP machine, sees    Singulair, Zyrtec and Mirapex     6. Hypertension - no antihypertensives until appropriate.     7. Hypersomnia - on ritalin and sunosi PTA. Cont lower dose of ritalin today -pt reports on POD1 when he took 1 dose of 40 mg he felt his heart racing (normally takes 40 mg QID).    8. Acute post op respiratory insufficiency: On Ra now.  TCDB. IS, progressive ambulation. Give 20 PO lasix today.      9. Acute blood loss anemia: monitor HH     10. Nausea: much improved.     11. PAF: in NSR this morning, had afib with RVR overnight. Start PO amio, stop amio gtt. Monitor rhythm.      12. Prophylaxis: lovenox, Protonix. Referral to outpatient cardiac rehab made. Discharge Vital Signs:   Visit Vitals  BP (!) 167/68 (BP 1 Location: Left arm, BP Patient Position: Sitting;Post activity)   Pulse 78   Temp 98.4 °F (36.9 °C)   Resp 18   Ht 6' (1.829 m)   Wt 228 lb 13.4 oz (103.8 kg)   SpO2 98%   BMI 31.04 kg/m²       Labs:   Recent Labs     12/12/20  0403 12/11/20  1128   WBC 11.1  --    HGB 10.2*  --    HCT 32.0*  --    *  --      --    K 4.2  --    BUN 21*  --    CREA 0.86  --    *  --    GLUCPOC  --  139*       Diagnostics:   CXR Results  (Last 48 hours)    None          Patient Instructions/Follow Up Care:  Discharge instructions were reviewed with the patient and family present. Questions were also answered at this time. Prescriptions and medications were reviewed.  The patient has a follow up appointment with the Nurse Practitioner or [facility] Assistant on 12/18/20 and with Dr. Medardo Tate on 1/7/21. The patient was also instructed to follow up with his primary care physician as needed. The patient and family were encouraged to call with any questions or concerns.        Signed:  Nic Sparks NP  12/14/2020  4:01 PM

## 2020-12-11 NOTE — PROGRESS NOTES
Cardiac Surgery Care Coordinator-  Met with Mera Austinini II, reviewed plan of care and discharge instructions. Reinforced move in the tube, sternal precautions and continued use of the incentive spirometer. Reviewed the importance of daily temp and weight monitoring, discussed incisional care and reviewed signs and symptoms of infection. Red reminder bracelet on right wrist, reviewed purpose of the bracelet and when to call the MD.  Reminded pt of appts and encouraged participation in the Cardiac Wellness and rehab program after discharge. Encouraged Mera Pacini II to verbalize and emotional support given. Sophia Singh is without questions or concerns at this time.  Harrison Delacruz, [place]

## 2020-12-11 NOTE — ROUTINE PROCESS
End of Shift Note Bedside shift change report given to Kaya (oncoming nurse) by Sandi Henao (offgoing nurse). Report included the following information SBAR, Kardex, Intake/Output, Recent Results, Med Rec Status, Cardiac Rhythm NSR and Alarm Parameters Shift worked:  7p-7a Shift summary and any significant changes:  
 A&OX4. Reported incisional pain at level 5/10, 5mg aishwarya administered. Helped from chair to bed. All night meds administered. Sleeping now 3:21 AM 
Converted from NSR  to A fib, Hr in the 120,s. Md notified. Verbal order of amio 150mg IV bolus then 1mg IV for 6 hours. The heart rate now in the low 100's 
 
6:06 AM 
Wire care done as per protocol. Helped into a chair. Amio gtt still running for a fib. No signs of distress noted at this time Concerns for physician to address:  none Zone phone for oncoming shift:  [phone] Activity: 
Activity Level: Up with Assistance Number times ambulated in hallways past shift: 1 Number of times OOB to chair past shift: 1 Cardiac:  
Cardiac Monitoring: Yes     
Cardiac Rhythm: Normal sinus rhythm Access:  
Current line(s): PIV Genitourinary:  
Urinary status: voiding Respiratory:  
O2 Device: Room air Chronic home O2 use?: NO Incentive spirometer at bedside: YES Actual Volume (ml): 1500 ml GI: 
Last Bowel Movement Date: 12/07/20 Current diet:  DIET CARDIAC Regular Passing flatus: YES Tolerating current diet: YES 
% Diet Eaten: 50 % Pain Management:  
Patient states pain is manageable on current regimen: YES Skin: 
Steven Score: 20 Interventions: increase time out of bed Patient Safety: 
Fall Score: Total Score: 3 Interventions: bed/chair alarm and gripper socks High Fall Risk: Yes Length of Stay: 
Expected LOS: 6d 0h 
Actual LOS: 3 Sandi Henao

## 2020-12-11 NOTE — PROGRESS NOTES
Problem: Self Care Deficits Care Plan (Adult)  Goal: *Acute Goals and Plan of Care (Insert Text)  Description: FUNCTIONAL STATUS PRIOR TO ADMISSION: Patient was independent and active without use of DME.    HOME SUPPORT PRIOR TO ADMISSION: The patient lived with wife who can provide assist as needed. Occupational Therapy Goals:  Initiated 12/9/2020  1. Patient will perform grooming standing with item retrieval with modified independence within 7 days. 2. Patient will perform upper body dressing and lower body dressing with modified independence within 7 days. 3. Patient will perform toileting with modified independence within 7 days. 4. Patient will be independent with cardiac home exercise program within 7 days. 5. Patient will transfer from toilet with modified independence using the least restrictive device and appropriate durable medical equipment within 7 days. Outcome: Progressing Towards Goal   OCCUPATIONAL THERAPY TREATMENT  Patient: Geronimo Coffey II (23 y.o. male)  Date: 12/11/2020  Diagnosis: Aortic stenosis, severe [I35.0]  Aortic stenosis [I35.0]   <principal problem not specified>  Procedure(s) (LRB):  AORTIC VALVE REPLACEMENT WITH DELNIDO (N/A) 3 Days Post-Op  Precautions: Sternal(Move in the tube)  Chart, occupational therapy assessment, plan of care, and goals were reviewed. ASSESSMENT  Patient continues with skilled OT services and is progressing towards goals. Mild SOB noted with activity today but all vitals were stable and pt was on room air. Noted pt was on cardiac drip due to a-fib and pt was in normal rhythm during session today. Educated pt on pacing, home safety and adaptive strategies for ADLs with good understanding. Pt did voice concerns in relation to laying flat in bed as he was unable to tolerate this yesterday. Educated pt on propping up on pillows or laying on his side.   Also informed pt to practice bringing [facility] down incrementally while he his here to get use to it. He voiced understanding. He reported not doing his exercises due to sheet with examples on it being moved. Reviewed all exercises and pt performed all of then in standing. Left note on exercise sheet to leave the sheet on the table so pt can practice them at home. He had no concerns in regards to ADLS at discharge to home. Patient is verbalizing and is demonstrating understanding of mindful-based movements (\"move in the tube\") principles of keeping UEs proximal to ribcage to prevent lateral pull on the sternum during load-bearing activities with occasional cues for arms next to side during scooting cues required for compliance. Current Level of Function Impacting Discharge (ADLs): supervision ADL mobility and toilet transfers  Grooming  Washing Hands: Supervision(standing at sink)    Upper Body [address]: Stand-by assistance(like coat)  Shirt simulation with hospital gown: Stand-by assistance(like pull over)    Toileting  Bladder Hygiene: Independent  Clothing Management: Supervisio    Other factors to consider for discharge: none         PLAN :  Patient continues to benefit from skilled intervention to address the above impairments. Continue treatment per established plan of care. to address goals. Recommend with staff: ambulate in halls, to bathroom for ADLs    Recommend next OT session: full body dressing, item retrieval    Recommendation for discharge: (in order for the patient to meet his/her long term goals)  No skilled occupational therapy/ follow up rehabilitation needs identified at this time. , recommend return to home with wife's assist    This discharge recommendation:  Has been made in collaboration with the attending provider and/or case management    IF patient discharges home will need the following DME: none       SUBJECTIVE:   Patient stated I feel really sore in my chest where they did the surgery.     OBJECTIVE DATA SUMMARY: Cognitive/Behavioral Status:  Neurologic State: Alert  Orientation Level: Oriented X4  Cognition: Appropriate decision making; Appropriate for age attention/concentration; Appropriate safety awareness  Perception: Appears intact  Perseveration: No perseveration noted  Safety/Judgement: Awareness of environment; Fall prevention;Home safety    Functional Mobility and Transfers for ADLs:  Bed Mobility:       Transfers:  Sit to Stand: Supervision;Stand-by assistance  Functional Transfers  Bathroom Mobility: Supervision/set up  Toilet Transfer : Supervision       Balance:  Sitting: Intact; Without support  Standing: Impaired; Without support  Standing - Static: Good  Standing - Dynamic : Good    ADL Intervention:       Grooming  Washing Hands: Supervision(standing at sink)              Upper Body [address]: Stand-by assistance(like coat)  Shirt simulation with hospital gown: Stand-by assistance(like pull over)         Toileting  Bladder Hygiene: Independent  Clothing Management: Supervision    Cognitive Retraining  Safety/Judgement: Awareness of environment; Fall prevention;Home safety    Patient instructed and educated on mindful movement principles based on Move in The Tube concept to include maintaining bilateral elbows close to rib cage when performing any load-bearing activity such as getting in/out of bed, pushing up from a chair, opening a door, or lifting a box. Patient was given a handout with diagrams of each correct/incorrect method of performing each of the above tasks. Patient instructed on the ability to utilize upper extremities outside the tube when doing any non-load bearing activity such as washing hair/body, brushing teeth, retrieving clothing items, or scratching your back. Patient encouraged to also perform upper extremity exercises \"outside of the tube\" to prevent scar tissue formation around sternal incision site.   Patient instructed in detail about activities to heed with caution, allowing pain to be the guide. These activities include but are not limited to: mowing the lawn, riding a bike, walking a dog, lifting a child, workshop hobbies, golfing, sexual activity, vacuuming, fishing, scrubbing the floors, and moving furniture. Patient was given the [address] in the [place] handout to describe each of these activities in detail. Patient instructed no asymmetrical reaching over head to ensure B UEs when shoulders >90* i.e. reaching in cabinets and dressing. Instruction on upper body dressing techniques of over head, then arms through to decrease pain and unilateral shoulder flexion >90*. Instruction on the benefits of utilizing B UEs during functional tasks i.e. opening the fridge, stepping into the tub. Instruction if continued pain at home with shoulder IR for BM hygiene can use wet wipes and toilet tongs PRN. Avoid valsalva maneuvers. ncrease activity tolerance for home, work, and sexual intercourse by pacing self with increasing the arm exercises, sitting duration, frequency OOB, walking, standing, and ADLs. Instructed and indicated understanding of s/s of too much activity, how to respond to s/s safely. Therapeutic Exercises:   Patient instructed on the benefits and demonstrated cardiac exercises while standing with Supervision. Instructed and indicated understanding on how to progress reps, sets against gravity, pacing through progressive muscle strengthening standing based on surgeon clearance for more weight in prep for basic and instrumental ADLs. Instruction on the use of household items in place of weights as needed.     CARDIAC   EXERCISE    Sets    Reps    Active  Active Assist    Passive  Self ROM    Comments    Shoulder flexion  1  10  [x]                            []                             []                             []                                Shoulder abduction  1  10 [x]                             []                             [] []                                Scapular elevation  1  10  [x]                             []                              []                             []                                Scapular retraction  1  10 [x]                             []                             []                             []                                Trunk rotation  1  10 [x]                             []                             []                             []                                Trunk sidebending  1  10 [x]                             []                              []                             []                                          Pain:  Moderate pain chest incision site    Activity Tolerance:   Good    After treatment patient left in no apparent distress:   Sitting in chair and Call bell within reach    COMMUNICATION/COLLABORATION:   The patients plan of care was discussed with: Physical therapist, Registered nurse and patient.      Tere Dugan OTR/L  Time Calculation: 41 mins

## 2020-12-11 NOTE — DISCHARGE INSTRUCTIONS
[facility]    [address], [address] [facility]  Office- [phone]  Fax- [phone]  _____________________________________________________________  Dr. Miguel Briggs PA-C           Name:Fox Landis II     Active Problems: Aortic stenosis (10/27/2020)      Aortic stenosis, severe (12/8/2020)      S/P AVR (aortic valve replacement) (12/8/2020)        Discharge Date: 12/11/2020     Discharge to: Home      INSTRUCTIONS:  NO SMOKING OR TOBACCO PRODUCTS  Follow all the instructions in your discharge book  You may shower. Wash all incisions twice daily with mild soap and water. No lotions, ointments or powder. Call the office immediately for any redness, swelling, or drainage from your incision. Take your temperature daily and call for a temperature of 101 degrees or higher or for any symptoms that make you think you have and infection. Weigh yourself each morning. Call if you gain more than 5 pounds in 48 hours. Use the incentive spirometer 6-8 times a day-10 breaths each time. Use a pillow or your bear to splint your breastbone when coughing or sneezing. If you feel your breast bone clicking or popping, notify the office immediately. Walk several hundred feet several times daily. DIET  Eat an American Heart Association diet. If you are having trouble with your appetite, eat what you can. Try eating small, frequent meals throughout the day. ACTIVITY  NO DRIVING--you will be evaluated to drive at your follow up visit. Increase your activity by walking several times a day. Stay out of bed most of the day. When sitting, keep your legs elevated.   You may ride in a car, but you must get out every hour and walk around. If you ride in a car with an airbag that can not be switched off, put the seat ALL the way back or ride in the back seat. Any load bearing activity can be performed as long is it can be performed in the tube. You can reach out of the tube when performing activities that require heavy lifting. Let pain be your guide. Your pain level will keep you from doing anything extreme. FOLLOW UP           1. Your first follow up appointment with your surgeon's PA or NP will be on 12/18/20 at 10:30am. This will be a virtual phone appointment. 2. Your second appointment with your surgeon will be on 1/7/21 at 2:45pm. Office is located at [facility], [address] [facility], [address]. You may have one person with you at this visit. 3. Please call our office at [phone] if you are unable to make either one of these appointments. 4. Your appointment with Dr. Kenia Dean will be on 1/18/21 at 9:15am. Office phone [phone]. 5. Consult you primary care physician regarding your influenza & pneumovax vaccines. 6. PLEASE bring your medication bottles to each appointment. 7. Please call Cardiac Rehab at [phone] if you do not already have an appointment. 8. Please sign up for My Chart. CALL [phone] FOR ANY QUESTIONS OR PROBLEMS.     Signature:___________________________________________________

## 2020-12-11 NOTE — PROGRESS NOTES
Problem: Mobility Impaired (Adult and Pediatric)  Goal: *Acute Goals and Plan of Care (Insert Text)  Description: FUNCTIONAL STATUS PRIOR TO ADMISSION: Patient was independent and active without use of DME.     HOME SUPPORT PRIOR TO ADMISSION: The patient lived with his wife but did not require assist.    Physical Therapy Goals  Initiated 12/9/2020  1. Patient will move from supine to sit and sit to supine , scoot up and down, and roll side to side in bed with independence within 5 days. 2.  Patient will perform sit to/from stand with independence within 5 days. 3.  Patient will ambulate 250 feet with least restrictive assistive device and independence within 5 days. 4.  Patient will ascend/descend 5 stairs with one sided handrail(s) with modified independence within 5 days. 5.  Patient will perform cardiac exercises per protocol with modified independence within 5 days. 6.  Patient will verbally recall and functionally demonstrate mindful-based movements (\"move in the tube\") principles without cues within 5 days. Outcome: Progressing Towards Goal   PHYSICAL THERAPY TREATMENT  Patient: Chacorta Henao II (83 y.o. male)  Date: 12/11/2020  Diagnosis: Aortic stenosis, severe [I35.0]  Aortic stenosis [I35.0]   <principal problem not specified>  Procedure(s) (LRB):  AORTIC VALVE REPLACEMENT WITH DELNIDO (N/A) 3 Days Post-Op  Precautions: Sternal(Move in the tube)  Chart, physical therapy assessment, plan of care and goals were reviewed. ASSESSMENT  Patient continues with skilled PT services and is progressing towards goals. Patient is afib this am, currently on amio drip and in NSR. Patient progressing well and ambulated further in hallway with supervision-SBA. Negotiated 5 stairs with SBA and R HR although patient has 15 stairs to get to his bedroom (limited by IV pole this date). Anticipate patient will continue to make progress.       Patient is verbalizing and is demonstrating understanding of mindful-based movements (\"move in the tube\") principles of keeping UEs proximal to ribcage to prevent lateral pull on the sternum during load-bearing activities with verbal cues required for compliance. Current Level of Function Impacting Discharge (mobility/balance): SBA-Supervision    Other factors to consider for discharge: afib, pain         PLAN :  Patient continues to benefit from skilled intervention to address the above impairments. Continue treatment per established plan of care. to address goals. Recommendation for discharge: (in order for the patient to meet his/her long term goals)  HH PT vs OP cardiac rehab    This discharge recommendation:  Has been made in collaboration with the attending provider and/or case management    IF patient discharges home will need the following DME: none       SUBJECTIVE:   Patient stated Sharon Layne had an eventful morning but I want to walk.     OBJECTIVE DATA SUMMARY:   Patient mobilized on continuous portable monitor/telemetry. Critical Behavior:  Neurologic State: Alert  Orientation Level: Oriented X4  Cognition: Appropriate decision making  Safety/Judgement: Fall prevention, Home safety    Functional Mobility Training:  Bed Mobility:                      Transfers:  Sit to Stand: Supervision;Stand-by assistance  Stand to Sit: Supervision                               Balance:  Sitting: Intact; Without support  Standing: Impaired; Without support  Standing - Static: Good  Standing - Dynamic : Good    Ambulation/Gait Training:  Distance (ft): 300 Feet (ft)  Assistive Device: Gait belt  Ambulation - Level of Assistance: Stand-by assistance;Supervision        Gait Abnormalities: Altered arm swing;Decreased step clearance        Base of Support: Widened     Speed/Ct: Pace decreased (<100 feet/min)  Step Length: Right shortened;Left shortened                   Stairs:  Number of Stairs Trained: 5  Stairs - Level of Assistance: Stand-by assistance  Rail Use: Right Cardiac diagnosis intervention:  Patient instructed and educated on mindful movement principles based on Move in The Tube concept to include maintaining bilateral elbows close to rib cage when performing any load-bearing activity such as getting in/out of bed, pushing up from a chair, opening a door, or lifting a box. Patient was given a handout with diagrams of each correct/incorrect method of performing each of the above tasks. Therapeutic Exercises:   Patient performed cardiac exercises with OT. Activity Tolerance:   Good, SpO2 stable on RA, and requires rest breaks    After treatment patient left in no apparent distress:   Sitting in chair, Call bell within reach, and Caregiver / family present    COMMUNICATION/COLLABORATION:   The patients plan of care was discussed with: Occupational therapist, Registered nurse, and Case management.      Martin Rivera PT, DPT   Time Calculation: 14 mins

## 2020-12-12 ENCOUNTER — APPOINTMENT (OUTPATIENT)
Dept: GENERAL RADIOLOGY | Age: 60
DRG: 220 | End: 2020-12-12
Attending: PHYSICIAN ASSISTANT
Payer: COMMERCIAL

## 2020-12-12 VITALS
OXYGEN SATURATION: 98 % | BODY MASS INDEX: 31 KG/M2 | RESPIRATION RATE: 18 BRPM | DIASTOLIC BLOOD PRESSURE: 68 MMHG | TEMPERATURE: 98.4 F | SYSTOLIC BLOOD PRESSURE: 167 MMHG | HEIGHT: 72 IN | WEIGHT: 228.84 LBS | HEART RATE: 78 BPM

## 2020-12-12 LAB
ABO + RH BLD: NORMAL
ANION GAP SERPL CALC-SCNC: 1 MMOL/L (ref 5–15)
BLD PROD TYP BPU: NORMAL
BLD PROD TYP BPU: NORMAL
BLOOD GROUP ANTIBODIES SERPL: NORMAL
BPU ID: NORMAL
BPU ID: NORMAL
BUN SERPL-MCNC: 21 MG/DL (ref 6–20)
BUN/CREAT SERPL: 24 (ref 12–20)
CALCIUM SERPL-MCNC: 8.6 MG/DL (ref 8.5–10.1)
CHLORIDE SERPL-SCNC: 104 MMOL/L (ref 97–108)
CO2 SERPL-SCNC: 31 MMOL/L (ref 21–32)
CREAT SERPL-MCNC: 0.86 MG/DL (ref 0.7–1.3)
CROSSMATCH RESULT,%XM: NORMAL
CROSSMATCH RESULT,%XM: NORMAL
ERYTHROCYTE [DISTWIDTH] IN BLOOD BY AUTOMATED COUNT: 13.7 % (ref 11.5–14.5)
GLUCOSE SERPL-MCNC: 104 MG/DL (ref 65–100)
HCT VFR BLD AUTO: 32 % (ref 36.6–50.3)
HGB BLD-MCNC: 10.2 G/DL (ref 12.1–17)
MAGNESIUM SERPL-MCNC: 2.2 MG/DL (ref 1.6–2.4)
MCH RBC QN AUTO: 28.5 PG (ref 26–34)
MCHC RBC AUTO-ENTMCNC: 31.9 G/DL (ref 30–36.5)
MCV RBC AUTO: 89.4 FL (ref 80–99)
NRBC # BLD: 0 K/UL (ref 0–0.01)
NRBC BLD-RTO: 0 PER 100 WBC
PLATELET # BLD AUTO: 117 K/UL (ref 150–400)
PMV BLD AUTO: 11.9 FL (ref 8.9–12.9)
POTASSIUM SERPL-SCNC: 4.2 MMOL/L (ref 3.5–5.1)
RBC # BLD AUTO: 3.58 M/UL (ref 4.1–5.7)
SODIUM SERPL-SCNC: 136 MMOL/L (ref 136–145)
SPECIMEN EXP DATE BLD: NORMAL
STATUS OF UNIT,%ST: NORMAL
STATUS OF UNIT,%ST: NORMAL
UNIT DIVISION, %UDIV: 0
UNIT DIVISION, %UDIV: 0
WBC # BLD AUTO: 11.1 K/UL (ref 4.1–11.1)

## 2020-12-12 PROCEDURE — 85027 COMPLETE CBC AUTOMATED: CPT

## 2020-12-12 PROCEDURE — 74011000250 HC RX REV CODE- 250: Performed by: PHYSICIAN ASSISTANT

## 2020-12-12 PROCEDURE — 74011250637 HC RX REV CODE- 250/637: Performed by: PHYSICIAN ASSISTANT

## 2020-12-12 PROCEDURE — 71045 X-RAY EXAM CHEST 1 VIEW: CPT

## 2020-12-12 PROCEDURE — 83735 ASSAY OF MAGNESIUM: CPT

## 2020-12-12 PROCEDURE — 97116 GAIT TRAINING THERAPY: CPT | Performed by: PHYSICAL THERAPIST

## 2020-12-12 PROCEDURE — 97530 THERAPEUTIC ACTIVITIES: CPT | Performed by: PHYSICAL THERAPIST

## 2020-12-12 PROCEDURE — 74011250637 HC RX REV CODE- 250/637: Performed by: NURSE PRACTITIONER

## 2020-12-12 PROCEDURE — 74011250637 HC RX REV CODE- 250/637: Performed by: THORACIC SURGERY (CARDIOTHORACIC VASCULAR SURGERY)

## 2020-12-12 PROCEDURE — 80048 BASIC METABOLIC PNL TOTAL CA: CPT

## 2020-12-12 PROCEDURE — 36415 COLL VENOUS BLD VENIPUNCTURE: CPT

## 2020-12-12 PROCEDURE — 97110 THERAPEUTIC EXERCISES: CPT | Performed by: PHYSICAL THERAPIST

## 2020-12-12 RX ADMIN — METOPROLOL TARTRATE 12.5 MG: 25 TABLET, FILM COATED ORAL at 08:05

## 2020-12-12 RX ADMIN — METHYLPHENIDATE HYDROCHLORIDE 5 MG: 5 TABLET ORAL at 08:05

## 2020-12-12 RX ADMIN — PANTOPRAZOLE SODIUM 40 MG: 40 TABLET, DELAYED RELEASE ORAL at 08:04

## 2020-12-12 RX ADMIN — CLOPIDOGREL BISULFATE 75 MG: 75 TABLET ORAL at 08:04

## 2020-12-12 RX ADMIN — POLYETHYLENE GLYCOL 3350 17 G: 17 POWDER, FOR SOLUTION ORAL at 08:04

## 2020-12-12 RX ADMIN — POTASSIUM CHLORIDE 20 MEQ: 750 TABLET, FILM COATED, EXTENDED RELEASE ORAL at 08:05

## 2020-12-12 RX ADMIN — CHLORHEXIDINE GLUCONATE 0.12% ORAL RINSE 10 ML: 1.2 LIQUID ORAL at 08:04

## 2020-12-12 RX ADMIN — MAGNESIUM OXIDE TAB 400 MG (241.3 MG ELEMENTAL MG) 400 MG: 400 (241.3 MG) TAB at 08:21

## 2020-12-12 RX ADMIN — FUROSEMIDE 20 MG: 20 TABLET ORAL at 08:05

## 2020-12-12 RX ADMIN — MONTELUKAST SODIUM 10 MG: 10 TABLET ORAL at 08:04

## 2020-12-12 RX ADMIN — CETIRIZINE HYDROCHLORIDE 10 MG: 10 TABLET, FILM COATED ORAL at 08:05

## 2020-12-12 RX ADMIN — MUPIROCIN: 20 OINTMENT TOPICAL at 08:05

## 2020-12-12 RX ADMIN — THERA TABS 1 TABLET: TAB at 08:04

## 2020-12-12 RX ADMIN — ASPIRIN 81 MG CHEWABLE TABLET 81 MG: 81 TABLET CHEWABLE at 08:05

## 2020-12-12 RX ADMIN — Medication 5 TABLET: at 08:04

## 2020-12-12 RX ADMIN — DOCUSATE SODIUM - SENNOSIDES 1 TABLET: 50; 8.6 TABLET, FILM COATED ORAL at 08:04

## 2020-12-12 RX ADMIN — TAMSULOSIN HYDROCHLORIDE 0.4 MG: 0.4 CAPSULE ORAL at 08:05

## 2020-12-12 RX ADMIN — AMIODARONE HYDROCHLORIDE 400 MG: 200 TABLET ORAL at 08:04

## 2020-12-12 NOTE — PROGRESS NOTES
Problem: Mobility Impaired (Adult and Pediatric)  Goal: *Acute Goals and Plan of Care (Insert Text)  Description: FUNCTIONAL STATUS PRIOR TO ADMISSION: Patient was independent and active without use of DME.     HOME SUPPORT PRIOR TO ADMISSION: The patient lived with his wife but did not require assist.    Physical Therapy Goals  Initiated 12/9/2020  1. Patient will move from supine to sit and sit to supine , scoot up and down, and roll side to side in bed with independence within 5 days. 2.  Patient will perform sit to/from stand with independence within 5 days. 3.  Patient will ambulate 250 feet with least restrictive assistive device and independence within 5 days. 4.  Patient will ascend/descend 5 stairs with one sided handrail(s) with modified independence within 5 days. 5.  Patient will perform cardiac exercises per protocol with modified independence within 5 days. 6.  Patient will verbally recall and functionally demonstrate mindful-based movements (\"move in the tube\") principles without cues within 5 days. Outcome: Progressing Towards Goal   PHYSICAL THERAPY TREATMENT  Patient: Wolf Spain II (89 y.o. male)  Date: 12/12/2020  Diagnosis: Aortic stenosis, severe [I35.0]  Aortic stenosis [I35.0]   <principal problem not specified>  Procedure(s) (LRB):  AORTIC VALVE REPLACEMENT WITH DELNIDO (N/A) 4 Days Post-Op  Precautions: Sternal(Move in the tube)  Chart, physical therapy assessment, plan of care and goals were reviewed. ASSESSMENT  Patient continues with skilled PT services and is progressing towards goals. Patient able to complete overall mobility with SBA. Gait is slow and mildly guarded but no overt LOB noted. Patient able to ambulate 300 feet in halls and ascend/descend 12 steps using one railing. Increased SOB noted with activity but O2 sats remained 94%.  .     Patient is verbalizing and is demonstrating understanding of mindful-based movements (\"move in the tube\") principles of keeping UEs proximal to ribcage to prevent lateral pull on the sternum during load-bearing activities with verbal cues required for compliance. Current Level of Function Impacting Discharge (mobility/balance): SBA             PLAN :  Patient continues to benefit from skilled intervention to address the above impairments. Continue treatment per established plan of care. to address goals. Recommendation for discharge: (in order for the patient to meet his/her long term goals)  Physical therapy at least 2 days/week in the home     This discharge recommendation:  Has not yet been discussed the attending provider and/or case management    IF patient discharges home will need the following DME: none       SUBJECTIVE:   Patient stated I didn't walk as far as I had hoped yesterday.     OBJECTIVE DATA SUMMARY:   Patient mobilized on continuous portable monitor/telemetry. Critical Behavior:  Neurologic State: Alert  Orientation Level: Oriented X4  Cognition: Follows commands  Safety/Judgement: Awareness of environment, Fall prevention, Home safety    Functional Mobility Training:  Bed Mobility:      deferred; patient sitting in chair upon arrival                Transfers:  Sit to Stand: Stand-by assistance; Additional time  Stand to Sit: Stand-by assistance                               Balance:  Sitting: Intact  Standing: Impaired  Standing - Static: Good; Unsupported  Standing - Dynamic : Good; Unsupported    Ambulation/Gait Training:  Distance (ft): 300 Feet (ft)  Assistive Device: Gait belt  Ambulation - Level of Assistance: Stand-by assistance        Gait Abnormalities: Decreased step clearance; Altered arm swing(guarded gait)        Base of Support: Widened     Speed/Ct: Pace decreased (<100 feet/min); Slow  Step Length: Left shortened;Right shortened         Gait is slow and guarded but steady overall on level surfaces       Stairs:  Number of Stairs Trained: 12  Stairs - Level of Assistance: Stand-by assistance  Rail Use: Right     Cardiac diagnosis intervention:  Patient instructed and educated on mindful movement principles based on Move in The Tube concept to include maintaining bilateral elbows close to rib cage when performing any load-bearing activity such as getting in/out of bed, pushing up from a chair, opening a door, or lifting a box. Patient was given a handout with diagrams of each correct/incorrect method of performing each of the above tasks. Therapeutic Exercises:   Patient instructed on the benefits and demonstrated cardiac exercises while standing with Supervision. Instructed and indicated understanding on how to progress reps, sets against gravity, pacing through progressive muscle strengthening standing based on surgeon clearance for more weight in prep for basic and instrumental ADLs. Instruction on the use of household items in place of weights as needed.      CARDIAC  EXERCISE   Sets   Reps   Active Active Assist   Passive Self ROM   Comments   Shoulder flexion 1 5 [x]                                            []                                            []                                            []                                               Shoulder abduction 1      5 [x]                                            []                                            []                                            []                                               Scapular elevation 1 5 [x]                                            []                                            []                                            []                                               Scapular retraction 1 5 [x]                                            []                                            []                                            []                                               Trunk rotation 1 5 [x]                                            [] []                                            []                                               Trunk sidebending 1 5 [x]                                            []                                            []                                            []                                                  []                                            []                                            []                                            []                                                       Activity Tolerance:   Fair and requires rest breaks    After treatment patient left in no apparent distress:   Sitting in chair and Call bell within reach    COMMUNICATION/COLLABORATION:   The patients plan of care was discussed with: Registered nurse and Rehabilitation technician.      Yamilka Chow, PT

## 2020-12-12 NOTE — PROGRESS NOTES
Reviewed discharge instructions and prescriptions with patient and spouse. Patient verbalizes understanding. No questions at this time. PIV x 2 removed. Tele box removed. Patient taken to front lobby for discharge via wheelchair.

## 2020-12-12 NOTE — PROGRESS NOTES
No complaints this AM. States he's \"ready to go home\"  Walked with PT  Will dc today and follow up next week

## 2020-12-12 NOTE — ROUTINE PROCESS
End of Shift Note Bedside shift change report given to Remy (oncoming nurse) by Judge Junior (offgoing nurse). Report included the following information SBAR, Kardex, Intake/Output, MAR, Recent Results, Med Rec Status, Cardiac Rhythm NSR and Alarm Parameters Shift worked:  7P-7A Shift summary and any significant changes:  
 Resting in bed with eyes open. Alert and oriented X4. Medicated for pain and resting leg syndrome as per PRN orders. All meds administered as per schedule. No acute concerns at this time. Possibly discharged home today. Concerns for physician to address: None [facility] phone for oncoming shift:  [phone] Activity: 
Activity Level: Up with Assistance Number times ambulated in hallways past shift: 1 Number of times OOB to chair past shift: 1 Cardiac:  
Cardiac Monitoring: Yes     
Cardiac Rhythm: Normal sinus rhythm Access:  
Current line(s): PIV Genitourinary:  
Urinary status: voiding Respiratory:  
O2 Device: Room air Chronic home O2 use?: No 
Incentive spirometer at bedside: YES Actual Volume (ml): 1500 ml GI: 
Last Bowel Movement Date: 12/07/20 Current diet:  DIET CARDIAC Regular Passing flatus: YES Tolerating current diet: YES 
% Diet Eaten: 100 % Pain Management:  
Patient states pain is manageable on current regimen: YES Skin: 
Steven Score: 21 Interventions: increase time out of bed Patient Safety: 
Fall Score: Total Score: 2 Interventions: bed/chair alarm and gripper socks High Fall Risk: Yes Length of Stay: 
Expected LOS: 6d 0h 
Actual LOS: 4 Judge Junior

## 2020-12-13 ENCOUNTER — HOME CARE VISIT (OUTPATIENT)
Dept: SCHEDULING | Facility: HOME HEALTH | Age: 60
End: 2020-12-13
Payer: COMMERCIAL

## 2020-12-13 PROCEDURE — G0299 HHS/HOSPICE OF RN EA 15 MIN: HCPCS

## 2020-12-13 PROCEDURE — 400013 HH SOC

## 2020-12-14 ENCOUNTER — DOCUMENTATION ONLY (OUTPATIENT)
Dept: CASE MANAGEMENT | Age: 60
End: 2020-12-14

## 2020-12-14 ENCOUNTER — TELEPHONE (OUTPATIENT)
Dept: CARDIOTHORACIC SURGERY | Age: 60
End: 2020-12-14

## 2020-12-14 DIAGNOSIS — R50.9 FEVER, UNSPECIFIED FEVER CAUSE: ICD-10-CM

## 2020-12-14 DIAGNOSIS — Z95.2 S/P AVR (AORTIC VALVE REPLACEMENT): Primary | ICD-10-CM

## 2020-12-14 RX ORDER — SULFAMETHOXAZOLE AND TRIMETHOPRIM 800; 160 MG/1; MG/1
1 TABLET ORAL 2 TIMES DAILY
Qty: 14 TAB | Refills: 0 | Status: SHIPPED | OUTPATIENT
Start: 2020-12-14 | End: 2020-12-21

## 2020-12-14 NOTE — PROGRESS NOTES
Pt reports fever of 100.9. He took tylenol and it came down to 99. He denies redness/sternal drainage. He does have some drainage from one of his CT sites that is pink and thin. He denies burning with urination. He denies coughing up mucus. He has been using his IS. He reports significant improvement in his LE edema since discharge. He is otherwise feeling well. Will send in prescription for bactrim (has allergy to keflex) and see in office on Thursday.

## 2020-12-14 NOTE — PROGRESS NOTES
Cardiac Surgery Discharge - Follow up call placed to [address] [place] [address] Reviewed plan of care after discharge and encouraged [address] [place] [address] to verbalize. Discussed precautions. Encouraged continued use of the incentive spirometer, daily weights and temp. He is pulling 1750ml on spirometer. Discussed pt concerns of low grade temp, 100.9, 99.9. He is taking Tylenol and it has not gone above 100.9. No signs or symptoms of infection. Home health nurse has seen and evaluated pt. Notified Danielle Anderson NP. Confirmed follow up appts and reinforced importance of wearing red reminder bracelet. [address] [place] [address] is without questions or concerns.  Shelby Batista RN

## 2020-12-14 NOTE — TELEPHONE ENCOUNTER
Pt reports temp 100.9 last evening that came down to 99 with tylenol. Temp 100.9 again this morning. He denies redness/drainage from sternal incision. He does have some drainage from 1 of his CT sites that is thin and pink. He denies pain/burning with urination and he is not coughing up mucus. He is using his IS around 1700 ml. He reports significant improvement in his LE edema. He is otherwise feeling well. Sent in prescription for bactrim (allergy to keflex). Will see in clinic Thursday.

## 2020-12-15 ENCOUNTER — HOME CARE VISIT (OUTPATIENT)
Dept: SCHEDULING | Facility: HOME HEALTH | Age: 60
End: 2020-12-15
Payer: COMMERCIAL

## 2020-12-15 VITALS
TEMPERATURE: 97.9 F | RESPIRATION RATE: 17 BRPM | DIASTOLIC BLOOD PRESSURE: 71 MMHG | HEART RATE: 72 BPM | OXYGEN SATURATION: 98 % | SYSTOLIC BLOOD PRESSURE: 142 MMHG

## 2020-12-15 PROCEDURE — G0300 HHS/HOSPICE OF LPN EA 15 MIN: HCPCS

## 2020-12-17 ENCOUNTER — OFFICE VISIT (OUTPATIENT)
Dept: CARDIOTHORACIC SURGERY | Age: 60
End: 2020-12-17
Payer: COMMERCIAL

## 2020-12-17 VITALS
TEMPERATURE: 100.1 F | OXYGEN SATURATION: 97 % | SYSTOLIC BLOOD PRESSURE: 140 MMHG | HEART RATE: 86 BPM | DIASTOLIC BLOOD PRESSURE: 70 MMHG | RESPIRATION RATE: 18 BRPM

## 2020-12-17 DIAGNOSIS — J45.20 MILD INTERMITTENT ASTHMA WITHOUT COMPLICATION: ICD-10-CM

## 2020-12-17 DIAGNOSIS — I10 ESSENTIAL HYPERTENSION: ICD-10-CM

## 2020-12-17 DIAGNOSIS — Z95.2 S/P AVR (AORTIC VALVE REPLACEMENT): ICD-10-CM

## 2020-12-17 DIAGNOSIS — E78.00 HYPERCHOLESTEROLEMIA: ICD-10-CM

## 2020-12-17 DIAGNOSIS — I35.0 NONRHEUMATIC AORTIC VALVE STENOSIS: Primary | ICD-10-CM

## 2020-12-17 PROCEDURE — 99024 POSTOP FOLLOW-UP VISIT: CPT | Performed by: NURSE PRACTITIONER

## 2020-12-17 RX ORDER — FUROSEMIDE 20 MG/1
20 TABLET ORAL
Qty: 30 TAB | Refills: 0 | Status: SHIPPED | OUTPATIENT
Start: 2020-12-17 | End: 2021-01-16

## 2020-12-17 RX ORDER — POTASSIUM CHLORIDE 1500 MG/1
20 TABLET, FILM COATED, EXTENDED RELEASE ORAL DAILY
Qty: 30 TAB | Refills: 0 | Status: SHIPPED | OUTPATIENT
Start: 2020-12-17 | End: 2021-01-16

## 2020-12-17 NOTE — PROGRESS NOTES
Patient: Musa Vera II   Age: 63 y.o. Patient Care Team:  Vanna Gar MD as PCP - Clara Beebe MD (Cardiology)  Lizzie Chin MD (Cardiothoracic Surgery)    Diagnosis: The primary encounter diagnosis was Nonrheumatic aortic valve stenosis. Diagnoses of S/P AVR (aortic valve replacement), Essential hypertension, Hypercholesterolemia, and Mild intermittent asthma without complication were also pertinent to this visit. Problem List:   Patient Active Problem List   Diagnosis Code    Asthma J45.909    HTN (hypertension) I10    Hypertension I10    Hypercholesterolemia E78.00    Lumbar herniated disc M51.26    Aortic stenosis I35.0    Aortic stenosis, severe I35.0    S/P AVR (aortic valve replacement) Z95.2        This service was provided thru telehealth, between Floyd Marion NP at [facility]ist's office and Harmeet Caballero II at their home. He  Is agreeable to a telehealth visit in lieu of an in person visit due to Covid. We spent 35 minutes together virtually    Date of Surgery: 12-    Surgery: AVR    HPI:  Pt is here for post op follow up. This is his initial visit. He was prescribed Lasix 20 daily for seven days with potassium supplementation. He has lost 3.5 pounds. He is up 1.5 pounds today. He has some pink area around top of incision by neck . I looked at this via Ibrahima Damon. There is no tracking of redness, no drainage and seems improved. The patient says it is not hot. He has had a low grade temp of 99. He denies cough, malaise, nausea, vomiting, diarrhea, or urinary symptoms. He has no known exposure to Covid. He will complete Bactrim prescribed on Monday and Lasix and Potassium.     Previous HPI  59 y.o. male with PMHx of AS,CAD, Hypertension, Dyslipidemia, Mild bilateral Carotid Artery Stenosis, Hypersomnia on Adderal,Asthma, ZAKIA/RLS on CPAP and previous back surgery that is referred to the [address] [place] [address] by Dr. Pauline Kayser interventional evaluation of his severe aortic stenosis.     Mr. Cassi Harrington is  and has four adult children. He is a retired .      He has no history of tobacco or alcohol abuse. His family history includes both mother and father with MI and Hypertension     He has been experiencing fatigue and AGUIRRE. He denies chest pain, palpitations, lightheadedness, and syncope. He recently had a treadmill stress test which demonstrated adequate exercise tolerance and increased shortness of breath. He also had noted ST depression.      He has followed with a pulmonologist/Sleep Study physician, Dr. Rachel Petit for over 10 years. His wife and the patient describe significant asthma/ZAKIA. He is compliant with his CPAP. He doesn't recall having PFTs in the recent years. He does have a significant history of asthma exacerbations in childhood.     I attempted to call Dr. Rachel Petit but his office is closed and they are closed on Fridays. The patient will discuss this on Monday with Dr. Benjamin Ferrell office and see if we can connect.     Today he underwent cardiac cath which revealed mild CAD and severe AS. We have been asked to render a surgical opinion.     Update: Pt was seen by Dr. Aguila Diaz from [facility] for abnormal chest CT. He had a bronch and cultures sent. Cleared by pulmonary for surgery.      Current Medications:   Current Outpatient Medications   Medication Sig Dispense Refill    trimethoprim-sulfamethoxazole (BACTRIM DS, SEPTRA DS) 160-800 mg per tablet Take 1 Tab by mouth two (2) times a day for 7 days. 14 Tab 0    amiodarone (CORDARONE) 200 mg tablet Take 400 mg by mouth twice daily for 2 weeks. Then take 400 mg by mouth daily for 2 weeks. Then you are done with this medication. 84 Tab 0    aspirin 81 mg chewable tablet Take 1 Tab by mouth daily. (Patient not taking: Reported on 12/13/2020) 30 Tab 1    clopidogreL (PLAVIX) 75 mg tab Take 1 Tab by mouth daily.  30 Tab 1    furosemide (LASIX) 20 mg tablet Take 1 Tab by mouth daily for 7 days. 7 Tab 0    metoprolol tartrate (LOPRESSOR) 25 mg tablet Take 0.5 Tabs by mouth two (2) times a day. 30 Tab 1    oxyCODONE IR (ROXICODONE) 5 mg immediate release tablet Take 1 Tab by mouth every four (4) hours as needed for Pain for up to 7 days. Max Daily Amount: 30 mg. 40 Tab 0    polyethylene glycol (MIRALAX) 17 gram packet Take 1 Packet by mouth daily. 14 Packet 0    potassium chloride SR (K-TAB) 20 mEq tablet Take 1 Tab by mouth daily for 7 days. 7 Tab 0    senna-docusate (PERICOLACE) 8.6-50 mg per tablet Take 1 Tab by mouth two (2) times a day. 60 Tab 0    tamsulosin (FLOMAX) 0.4 mg capsule Take 1 Cap by mouth daily. 30 Cap 1    methylphenidate HCl (Ritalin) 20 mg tablet Take 20 mg by mouth four (4) times daily. 6 am, 10 am, 2 pm, and 6 pm   NOT TAKING      cholecalciferol, vitamin D3, (VITAMIN D3 PO) Take 5,000 Units by mouth daily.  solriamfetoL (Sunosi) 150 mg tab Take 1 Tab by mouth daily. NOT TAKING      Cetirizine 10 mg cap Take 1 Tab by mouth daily.  multivitamin (ONE A DAY) tablet Take 1 Tab by mouth daily.  rosuvastatin (CRESTOR) 20 mg tablet Take 20 mg by mouth nightly.  pramipexole (MIRAPEX) 0.5 mg tablet Take 0.5 mg by mouth every evening. 8 pm      montelukast (SINGULAIR) 10 mg tablet Take 10 mg by mouth daily.  pramipexole (MIRAPEX) 0.125 mg tablet Take 0.125 mg by mouth daily (after lunch). 2 pm      cpap machine kit by Does Not Apply route. Vitals: There were no vitals taken for this visit. Allergies: is allergic to keflex [cephalexin] and pcn [penicillins]. Physical Exam:  Wounds: clean, dry, healing    Virtual    Assessment/Plan:   1. Aortic Stenosis s/p tissue AVR: ASA/Plavix per Dr. Dayan Harrington. Will touch base on Tuesday with me with weights, vitals and look of incision.  HE does not need to see PCP next week as scheduled by case management unless he needs management of other conditions, which he does not seem to need. I will send over Lasix 20 and Potassium prescription to take prn for weight increases greater than 2 pounds in the AM. He asked that I send a letter to his PCP.   Finish Bactrim prescribed for increased temp and incision redness from 12/14, this is improving. Will continue antibacterial wash and dry. No direct shower water on incision yet. 2. CAD -Minimal by cath, ASA, Statin, on metop.      3. Bilateral Carotid Artery Stenosis - Mild     4. Dyslipidemia - on statin     5. Asthma/ZAKIA/RLS - CPAP machine, sees    Singulair, Zyrtec and Mirapex     6. Hypertension - no antihypertensives until appropriate.     7. Hypersomnia - on ritalin and sunosi PTA. Cont lower dose of ritalin today -pt reports on POD1 when he took 1 dose of 40 mg he felt his heart racing (normally takes 40 mg QID).    8. Acute post op respiratory insufficiency: On Ra now.  TCDB. IS, progressive ambulation. Give 20 PO lasix today.      9. Acute blood loss anemia: monitor HH     10. Nausea: much improved.     11. PAF: in NSR this morning, had afib with RVR overnight. Amio 400 bid    Pt is ready to start cardiac rehab. Rehab - yes  Walking: as best he can with the weather  Glucometer: NA  Antibiotic card for valves:  Will need card at one month visit

## 2020-12-18 ENCOUNTER — HOME CARE VISIT (OUTPATIENT)
Dept: SCHEDULING | Facility: HOME HEALTH | Age: 60
End: 2020-12-18
Payer: COMMERCIAL

## 2020-12-18 PROCEDURE — G0300 HHS/HOSPICE OF LPN EA 15 MIN: HCPCS

## 2020-12-21 ENCOUNTER — HOME CARE VISIT (OUTPATIENT)
Dept: SCHEDULING | Facility: HOME HEALTH | Age: 60
End: 2020-12-21
Payer: COMMERCIAL

## 2020-12-21 VITALS
HEART RATE: 60 BPM | OXYGEN SATURATION: 100 % | TEMPERATURE: 97.6 F | DIASTOLIC BLOOD PRESSURE: 62 MMHG | WEIGHT: 220.4 LBS | BODY MASS INDEX: 29.89 KG/M2 | SYSTOLIC BLOOD PRESSURE: 148 MMHG

## 2020-12-21 PROCEDURE — G0299 HHS/HOSPICE OF RN EA 15 MIN: HCPCS

## 2020-12-23 ENCOUNTER — HOME CARE VISIT (OUTPATIENT)
Dept: SCHEDULING | Facility: HOME HEALTH | Age: 60
End: 2020-12-23
Payer: COMMERCIAL

## 2020-12-23 VITALS
DIASTOLIC BLOOD PRESSURE: 68 MMHG | WEIGHT: 218.4 LBS | SYSTOLIC BLOOD PRESSURE: 130 MMHG | HEART RATE: 72 BPM | BODY MASS INDEX: 29.62 KG/M2 | OXYGEN SATURATION: 97 % | TEMPERATURE: 98.1 F

## 2020-12-23 PROCEDURE — G0299 HHS/HOSPICE OF RN EA 15 MIN: HCPCS

## 2020-12-25 ENCOUNTER — HOME CARE VISIT (OUTPATIENT)
Dept: SCHEDULING | Facility: HOME HEALTH | Age: 60
End: 2020-12-25
Payer: COMMERCIAL

## 2020-12-28 ENCOUNTER — HOME CARE VISIT (OUTPATIENT)
Dept: HOME HEALTH SERVICES | Facility: HOME HEALTH | Age: 60
End: 2020-12-28
Payer: COMMERCIAL

## 2020-12-29 VITALS
DIASTOLIC BLOOD PRESSURE: 78 MMHG | OXYGEN SATURATION: 97 % | HEART RATE: 78 BPM | SYSTOLIC BLOOD PRESSURE: 160 MMHG | TEMPERATURE: 99.5 F | RESPIRATION RATE: 18 BRPM

## 2020-12-30 LAB
ACID FAST STN SPEC: NEGATIVE
MYCOBACTERIUM SPEC QL CULT: NEGATIVE
SPECIMEN PREPARATION: NORMAL
SPECIMEN SOURCE: NORMAL

## 2021-01-04 RX ORDER — GUAIFENESIN 100 MG/5ML
LIQUID (ML) ORAL
Qty: 30 TAB | Refills: 1 | Status: SHIPPED | OUTPATIENT
Start: 2021-01-04

## 2021-01-04 RX ORDER — METOPROLOL TARTRATE 25 MG/1
TABLET, FILM COATED ORAL
Qty: 30 TAB | Refills: 1 | Status: SHIPPED | OUTPATIENT
Start: 2021-01-04 | End: 2021-01-07 | Stop reason: ALTCHOICE

## 2021-01-04 RX ORDER — CLOPIDOGREL BISULFATE 75 MG/1
TABLET ORAL
Qty: 30 TAB | Refills: 1 | Status: SHIPPED | OUTPATIENT
Start: 2021-01-04 | End: 2021-01-07 | Stop reason: ALTCHOICE

## 2021-01-04 RX ORDER — TAMSULOSIN HYDROCHLORIDE 0.4 MG/1
CAPSULE ORAL
Qty: 30 CAP | Refills: 1 | Status: SHIPPED | OUTPATIENT
Start: 2021-01-04

## 2021-01-07 ENCOUNTER — OFFICE VISIT (OUTPATIENT)
Dept: CARDIOTHORACIC SURGERY | Age: 61
End: 2021-01-07
Payer: COMMERCIAL

## 2021-01-07 VITALS
HEART RATE: 75 BPM | WEIGHT: 217 LBS | BODY MASS INDEX: 28.76 KG/M2 | DIASTOLIC BLOOD PRESSURE: 80 MMHG | HEIGHT: 73 IN | SYSTOLIC BLOOD PRESSURE: 160 MMHG | OXYGEN SATURATION: 97 % | TEMPERATURE: 98.8 F

## 2021-01-07 DIAGNOSIS — Z95.2 S/P AVR (AORTIC VALVE REPLACEMENT): Primary | ICD-10-CM

## 2021-01-07 PROCEDURE — 99024 POSTOP FOLLOW-UP VISIT: CPT | Performed by: THORACIC SURGERY (CARDIOTHORACIC VASCULAR SURGERY)

## 2021-01-07 RX ORDER — LISINOPRIL 10 MG/1
10 TABLET ORAL DAILY
Qty: 30 TAB | Refills: 1 | Status: SHIPPED | OUTPATIENT
Start: 2021-01-07 | End: 2021-02-01

## 2021-01-07 RX ORDER — LISINOPRIL 2.5 MG/1
10 TABLET ORAL DAILY
Qty: 30 TAB | Refills: 1 | Status: SHIPPED | OUTPATIENT
Start: 2021-01-07 | End: 2021-01-07

## 2021-01-07 RX ORDER — ALBUTEROL SULFATE 90 UG/1
2 AEROSOL, METERED RESPIRATORY (INHALATION)
Qty: 1 INHALER | Refills: 1 | Status: SHIPPED | OUTPATIENT
Start: 2021-01-07

## 2021-01-07 NOTE — PROGRESS NOTES
Patient: Safia Caro II   Age: 63 y.o. Patient Care Team:  Nora Canchola MD as PCP - Frankie Toscano MD (Cardiology)  Martinez Cruz MD (Cardiothoracic Surgery)    Diagnosis: The encounter diagnosis was S/P AVR (aortic valve replacement). Problem List:   Patient Active Problem List   Diagnosis Code    Asthma J45.909    HTN (hypertension) I10    Hypertension I10    Hypercholesterolemia E78.00    Lumbar herniated disc M51.26    Aortic stenosis I35.0    Aortic stenosis, severe I35.0    S/P AVR (aortic valve replacement) Z95.2          Date of Surgery: 12-     Surgery: AVR    HPI:  Pt is here for post op follow up, seen with Dr. Ria Brink. He is doing well. He is walking daily. He notices some wheezing since the initiation of the beta blocker. He would like to come off of this and resume Lisinopril. HE had minimal CAD. He also is wheezing with exercise. This seems upper airway. Will add Albuterol and he will fu with Pulmonology. Will stop Plavix per Dr. Ria Brink and continue ASA only. He has no other reason to stay on Plavix per chart or patient. Reached out to cardiac rehab to assess coverage or options since he doesn't have any. Current Medications:   Current Outpatient Medications   Medication Sig Dispense Refill    aspirin 81 mg chewable tablet TAKE 1 TABLET BY MOUTH EVERY DAY 30 Tab 1    tamsulosin (FLOMAX) 0.4 mg capsule TAKE 1 CAPSULE BY MOUTH EVERY DAY 30 Cap 1    clopidogreL (PLAVIX) 75 mg tab TAKE 1 TABLET BY MOUTH EVERY DAY 30 Tab 1    metoprolol tartrate (LOPRESSOR) 25 mg tablet TAKE 1/2 TABLETS BY MOUTH TWICE A DAY 30 Tab 1    furosemide (LASIX) 20 mg tablet Take 1 Tab by mouth daily as needed (for weight increases greater than 2 pounds a day) for up to 30 days. 30 Tab 0    potassium chloride SR (K-TAB) 20 mEq tablet Take 1 Tab by mouth daily for 30 days.  Indications: take when you take a Lasix dose 30 Tab 0    amiodarone (CORDARONE) 200 mg tablet Take 400 mg by mouth twice daily for 2 weeks. Then take 400 mg by mouth daily for 2 weeks. Then you are done with this medication. 84 Tab 0    cholecalciferol, vitamin D3, (VITAMIN D3 PO) Take 5,000 Units by mouth daily.  Cetirizine 10 mg cap Take 1 Tab by mouth daily.  multivitamin (ONE A DAY) tablet Take 1 Tab by mouth daily.  rosuvastatin (CRESTOR) 20 mg tablet Take 20 mg by mouth nightly.  pramipexole (MIRAPEX) 0.5 mg tablet Take 0.5 mg by mouth every evening. 8 pm      montelukast (SINGULAIR) 10 mg tablet Take 10 mg by mouth daily.  pramipexole (MIRAPEX) 0.125 mg tablet Take 0.125 mg by mouth daily (after lunch). 2 pm      cpap machine kit by Does Not Apply route.  methylphenidate HCl (Ritalin) 20 mg tablet Take 20 mg by mouth four (4) times daily. 6 am, 10 am, 2 pm, and 6 pm   NOT TAKING      solriamfetoL (Sunosi) 150 mg tab Take 1 Tab by mouth daily. NOT TAKING         Vitals: Pulse 75, temperature 98.8 °F (37.1 °C), SpO2 97 %. Allergies: is allergic to keflex [cephalexin] and pcn [penicillins]. Physical Exam:  Wounds: clean, healing    Lungs: clear to auscultation bilaterally    Heart: regular rate and rhythm, S1, S2 normal, no murmur, click, rub or gallop    Extremities: normal strength, tone, and muscle mass    Assessment/Plan:   1. Aortic Stenosis s/p tissue AVR: ASA daily     2. CAD -Minimal by cath, ASA, Statin, stop BB due to resp complaints     3. Bilateral Carotid Artery Stenosis - Mild     4. Dyslipidemia - on statin     5. Asthma/ZAKIA/RLS - CPAP machine, on Singulair, Zyrtec and Mirapex, added prn inhalers     6. Hypertension - stopped BB and started Lisinopril due to wheezing     7. Hypersomnia holding these meds per self report     8. Acute post op respiratory insufficiency: resolved     9. Acute blood loss anemia: resolved     10. Nausea: much improved.     11. PAF:no further episodes, complete Amio        Dispo: PT/OT, likely home tomorrow.      Pt is ready to start cardiac rehab.      Rehab - reached out, no insurance coverage  Walking: daily  Antibiotic card for valves: discussed

## 2021-01-07 NOTE — PATIENT INSTRUCTIONS
1. Stop Plavix and continue Aspirin only 2. Stop Metoprolol 12.5 due to wheezing and start Lisinopril 3. Continue Lasix as needed based on weight increases. 4. Cardiac Rehab will reach out to you 5. I prescribed Albuterol to use prior to exercise or as needed.

## 2021-02-01 RX ORDER — LISINOPRIL 10 MG/1
TABLET ORAL
Qty: 30 TAB | Refills: 1 | Status: SHIPPED | OUTPATIENT
Start: 2021-02-01

## 2022-03-18 PROBLEM — I35.0 AORTIC STENOSIS, SEVERE: Status: ACTIVE | Noted: 2020-12-08

## 2022-03-19 PROBLEM — Z95.2 S/P AVR (AORTIC VALVE REPLACEMENT): Status: ACTIVE | Noted: 2020-12-08

## 2022-03-20 PROBLEM — I35.0 AORTIC STENOSIS: Status: ACTIVE | Noted: 2020-10-27

## 2023-05-10 RX ORDER — ASPIRIN 81 MG/1
1 TABLET, CHEWABLE ORAL DAILY
COMMUNITY
Start: 2021-01-04

## 2023-05-10 RX ORDER — AMIODARONE HYDROCHLORIDE 200 MG/1
TABLET ORAL
COMMUNITY
Start: 2020-12-11

## 2023-05-10 RX ORDER — PRAMIPEXOLE DIHYDROCHLORIDE 0.5 MG/1
TABLET ORAL EVERY EVENING
COMMUNITY
Start: 2010-05-07

## 2023-05-10 RX ORDER — PRAMIPEXOLE DIHYDROCHLORIDE 0.12 MG/1
TABLET ORAL
COMMUNITY
Start: 2010-05-07

## 2023-05-10 RX ORDER — ROSUVASTATIN CALCIUM 20 MG/1
20 TABLET, COATED ORAL NIGHTLY
COMMUNITY

## 2023-05-10 RX ORDER — TAMSULOSIN HYDROCHLORIDE 0.4 MG/1
1 CAPSULE ORAL DAILY
COMMUNITY
Start: 2021-01-04

## 2023-05-10 RX ORDER — MONTELUKAST SODIUM 10 MG/1
10 TABLET ORAL DAILY
COMMUNITY

## 2023-05-10 RX ORDER — ALBUTEROL SULFATE 90 UG/1
2 AEROSOL, METERED RESPIRATORY (INHALATION) EVERY 6 HOURS PRN
COMMUNITY
Start: 2021-01-07

## 2023-05-10 RX ORDER — SOLRIAMFETOL 150 MG/1
1 TABLET, FILM COATED ORAL DAILY
COMMUNITY

## 2023-05-10 RX ORDER — LISINOPRIL 10 MG/1
1 TABLET ORAL DAILY
COMMUNITY
Start: 2021-02-01

## 2023-05-10 RX ORDER — METHYLPHENIDATE HYDROCHLORIDE 20 MG/1
TABLET ORAL 4 TIMES DAILY
COMMUNITY

## 2025-05-02 ENCOUNTER — ANESTHESIA (OUTPATIENT)
Facility: HOSPITAL | Age: 65
End: 2025-05-02
Payer: MEDICARE

## 2025-05-02 ENCOUNTER — ANESTHESIA EVENT (OUTPATIENT)
Facility: HOSPITAL | Age: 65
End: 2025-05-02
Payer: MEDICARE

## 2025-05-02 ENCOUNTER — HOSPITAL ENCOUNTER (OUTPATIENT)
Facility: HOSPITAL | Age: 65
Setting detail: OUTPATIENT SURGERY
Discharge: HOME | End: 2025-05-02
Attending: INTERNAL MEDICINE | Admitting: INTERNAL MEDICINE
Payer: MEDICARE

## 2025-05-02 VITALS
WEIGHT: 212.3 LBS | TEMPERATURE: 97.6 F | BODY MASS INDEX: 28.76 KG/M2 | DIASTOLIC BLOOD PRESSURE: 69 MMHG | OXYGEN SATURATION: 100 % | SYSTOLIC BLOOD PRESSURE: 130 MMHG | HEIGHT: 72 IN | HEART RATE: 71 BPM | RESPIRATION RATE: 16 BRPM

## 2025-05-02 PROCEDURE — 88305 TISSUE EXAM BY PATHOLOGIST: CPT

## 2025-05-02 PROCEDURE — 3600007512: Performed by: INTERNAL MEDICINE

## 2025-05-02 PROCEDURE — 7100000011 HC PHASE II RECOVERY - ADDTL 15 MIN: Performed by: INTERNAL MEDICINE

## 2025-05-02 PROCEDURE — 2709999900 HC NON-CHARGEABLE SUPPLY: Performed by: INTERNAL MEDICINE

## 2025-05-02 PROCEDURE — 3700000001 HC ADD 15 MINUTES (ANESTHESIA): Performed by: INTERNAL MEDICINE

## 2025-05-02 PROCEDURE — C1889 IMPLANT/INSERT DEVICE, NOC: HCPCS | Performed by: INTERNAL MEDICINE

## 2025-05-02 PROCEDURE — 3600007502: Performed by: INTERNAL MEDICINE

## 2025-05-02 PROCEDURE — 3700000000 HC ANESTHESIA ATTENDED CARE: Performed by: INTERNAL MEDICINE

## 2025-05-02 PROCEDURE — 6360000002 HC RX W HCPCS: Performed by: REGISTERED NURSE

## 2025-05-02 PROCEDURE — 2580000003 HC RX 258: Performed by: INTERNAL MEDICINE

## 2025-05-02 PROCEDURE — 7100000010 HC PHASE II RECOVERY - FIRST 15 MIN: Performed by: INTERNAL MEDICINE

## 2025-05-02 DEVICE — THE ASSURANCE CLIP -16 MM IS COMPATIBLE WITH ENDOSCOPES AND IS INDICATED FOR CLIP PLACEMENT WITHIN DIGESTIVE TRACT FOR THE PURPOSE OF MECHANICAL PRESSURE TREATMENT OF BLEEDING OF SMALL ARTERIES AND PULSATION.
Type: IMPLANTABLE DEVICE | Status: FUNCTIONAL
Brand: ASSURANCE

## 2025-05-02 RX ORDER — HYDROCHLOROTHIAZIDE 25 MG/1
12.5 TABLET ORAL DAILY
COMMUNITY

## 2025-05-02 RX ORDER — SODIUM CHLORIDE 9 MG/ML
INJECTION, SOLUTION INTRAVENOUS CONTINUOUS
Status: DISCONTINUED | OUTPATIENT
Start: 2025-05-02 | End: 2025-05-02 | Stop reason: HOSPADM

## 2025-05-02 RX ORDER — LIDOCAINE HYDROCHLORIDE 20 MG/ML
INJECTION, SOLUTION EPIDURAL; INFILTRATION; INTRACAUDAL; PERINEURAL
Status: DISCONTINUED | OUTPATIENT
Start: 2025-05-02 | End: 2025-05-02 | Stop reason: SDUPTHER

## 2025-05-02 RX ORDER — PROPOFOL 10 MG/ML
INJECTION, EMULSION INTRAVENOUS
Status: DISCONTINUED | OUTPATIENT
Start: 2025-05-02 | End: 2025-05-02 | Stop reason: SDUPTHER

## 2025-05-02 RX ADMIN — PROPOFOL 20 MG: 10 INJECTION, EMULSION INTRAVENOUS at 11:04

## 2025-05-02 RX ADMIN — SODIUM CHLORIDE: 0.9 INJECTION, SOLUTION INTRAVENOUS at 09:56

## 2025-05-02 RX ADMIN — PROPOFOL 100 MCG/KG/MIN: 10 INJECTION, EMULSION INTRAVENOUS at 10:37

## 2025-05-02 RX ADMIN — LIDOCAINE HYDROCHLORIDE 40 MG: 20 INJECTION, SOLUTION EPIDURAL; INFILTRATION; INTRACAUDAL; PERINEURAL at 10:38

## 2025-05-02 RX ADMIN — PROPOFOL 30 MG: 10 INJECTION, EMULSION INTRAVENOUS at 10:48

## 2025-05-02 RX ADMIN — PROPOFOL 30 MG: 10 INJECTION, EMULSION INTRAVENOUS at 10:41

## 2025-05-02 RX ADMIN — PROPOFOL 30 MG: 10 INJECTION, EMULSION INTRAVENOUS at 10:38

## 2025-05-02 RX ADMIN — PROPOFOL 30 MG: 10 INJECTION, EMULSION INTRAVENOUS at 10:39

## 2025-05-02 ASSESSMENT — PAIN - FUNCTIONAL ASSESSMENT
PAIN_FUNCTIONAL_ASSESSMENT: NONE - DENIES PAIN
PAIN_FUNCTIONAL_ASSESSMENT: NONE - DENIES PAIN
PAIN_FUNCTIONAL_ASSESSMENT: 0-10

## 2025-05-02 NOTE — PROCEDURES
PROCEDURE NOTE  Date: 5/2/2025   Name: Barry Frederick II  YOB: 1960        Esophagogastroduodenoscopy Procedure Note    Barry Frederick II  [id number]      Indications:  eval for EoE remission on budesonide 0.5mg BID    Anesthesia/Sedation: MAC anesthesia Propofol    Assistants: Circulator: Latisha Celeste RN  Endoscopy Technician: Alycia Luz CNA    Pre-Procedure Exam:  Airway: clear   Heart: normal S1and S2    Lungs: clear bilateral  Abdomen: soft, nontender, bowel sounds present and normal in all quadrants   Mental Status: awake, alert, and oriented to person, place, and time      Procedure in Detail:  Informed consent was obtained for the procedure, including conscious sedation. Risks of pancreatitis, infection, perforation, hemorrhage, adverse drug reaction, and aspiration were discussed. The patient was placed in the left lateral decubitus position.  Based on the pre-procedure assessment, including review of the patient's medical history, medications, allergies, and review of systems, he had been deemed to be an appropriate candidate for moderate sedation; he was therefore sedated with the medications listed above.  He was monitored continuously with electrocardiogram tracing, pulse oximetry, blood pressure monitoring, and direct observation.      The  pediatric colonoscope was inserted into the mouth and advanced under direct vision to third portion of the duodenum.  A careful inspection was made as the gastroscope was withdrawn, including a retroflexed view of the proximal stomach; findings and interventions are described below. Appropriate photodocumentation was obtained.    Findings:   OROPHARYNX: Cords and pyriform recesses normal.   ESOPHAGUS: The esophagus is normal.  White papules throughout, cold forceps biopsies  STOMACH: The fundus on antegrade and retroflex views is normal. The body, antrum, and pylorus are normal. Large hiatal hernia  DUODENUM: The bulb and second portions are

## 2025-05-02 NOTE — DISCHARGE INSTRUCTIONS
Barry CUCO Frederick JOSE L  [id number]  1960    COLON / EGD DISCHARGE INSTRUCTIONS    Discomfort:  Sore throat- throat lozenges or warm salt water gargle  Redness at IV site- apply warm compress to area; if redness or soreness persist- contact your physician  There may be a slight amount of blood passed from the rectum  Gaseous discomfort- walking, belching will help relieve any discomfort  You should note operate a vehicle for 12 hours  You should not engage in an occupation involving machinery or appliances for rest of today  You may note drink alcoholic beverages for at least 12 hours  Avoid making any critical decisions for at least 24 hour  DIET:   Soft diet for 3 days   - however -  remember your colon is empty and a heavy meal will produce gas.   Avoid these foods:  vegetables, fried / greasy foods, carbonated drinks for today     ACTIVITY:  You may resume your normal daily activities it is recommended that you spend the remainder of the day resting -  avoid any strenuous activity.    CALL M.D.  ANY SIGN OF:   Increasing pain, nausea, vomiting  Abdominal distension (swelling)  New increased bleeding (oral or rectal)  Fever (chills)  Pain in chest area  Bloody discharge from nose or mouth  Shortness of breath    Follow-up Instructions:  Pending path  - avoid NSAIDs for 2 weeks, tylenol is okay  - soft diet for 3 days  - continue budesonide  -start fluconazole 400mg day 1, then 200mg day 2-14 daily                      Barry CUCO Silvaailyn DOMINGO  [id number]  1960        DISCHARGE SUMMARY from Nurse    The following personal items collected during your admission are returned to you:   Dental Appliance:    Vision:    Hearing Aid:    Jewelry:    Clothing:    Other Valuables:    Valuables sent to safe: Dose (mL/hr) Propofol : 57.78 mL/hr    PATIENT INSTRUCTIONS:      DISCHARGE SUMMARY from Nurse    PATIENT INSTRUCTIONS:    After general anesthesia or intravenous sedation, for 24 hours or while taking prescription

## 2025-05-02 NOTE — H&P
directed rosuvastatin 20 mg tablet take 1 tablet by oral route  every day N Verified   taking as directed MONTELUKAST SOD 10 MG TABLET TAKE ONE TABLET BY MOUTH EVERY DAY N Verified   taking as directed armodafinil 250 mg tablet take 1 tablet by oral route  every day in the morning N Verified   taking as directed PRAMIPEXOLE 0.25 MG TABLET TAKE 1 TABLET BY MOUTH EVERY DAY IN THE AFTERNOON N Verified   taking as directed pramipexole 0.5 mg tablet TAKE 1 TABLET BY MOUTH EVERY DAY AFTER DINNER N Verified   taking as directed methylphenidate 10 mg tablet take 1 tablet by oral route  three times a day N Verified   taking as directed budesonide 0.5 mg/2 mL suspension for nebulization swallow 1 respules mixed with 5 Splenda twice a day N Verified     Medications (Added, Continued or Stopped today)  Start Date Medication Directions PRN Status PRN Reason Instruction Stop Date    All Day Allergy (cetirizine) 10 mg capsule  N      12/02/2024 armodafinil 250 mg tablet take 1 tablet by oral route  every day in the morning N      01/16/2025 budesonide 0.5 mg/2 mL suspension for nebulization swallow 1 respules mixed with 5 Splenda twice a day N      01/27/2025 budesonide 1 mg/2 mL suspension for nebulization mix suspension of 2 respules with 10 packets of Splenda and drink over 5 min, twice a day, do not eat/drink for 30 min after, rinse out mouth with liquid and spit N       CALCIUM 600-D3 PLUS  N      02/28/2024 hydrochlorothiazide 12.5 mg tablet take 1 tablet by oral route  every day N      02/28/2024 lisinopril 20 mg tablet take 1 tablet by oral route  every day N      01/02/2025 methylphenidate 10 mg tablet take 1 tablet by oral route  three times a day N       Miscellaneous Medication  ORAL Enteric-Coated Asprin 81mg BID N      10/07/2024 MONTELUKAST SOD 10 MG TABLET TAKE ONE TABLET BY MOUTH EVERY DAY N       Multiple Vitamins tablet take 1 tablet by oral route  every day with food N      05/06/2024 omeprazole 40 mg    10/16/2014 12:00:00 AM Influenza, preservative-free   10/2/2020 12:00:00 AM Pneumococcal 23   2/3/2020 12:00:00 AM Zoster Vaccine Subunit   9/19/2019 12:00:00 AM Zoster Vaccine Subunit   9/1/2017 12:00:00 AM Influenza, NOS   4/28/2015 12:00:00 AM TdaP > 7 years   10/29/2010 12:00:00 AM Novel Influenza-H1N1-09 all formulations   11/13/2009 12:00:00 AM Novel Influenza-H1N1-09     Pt seen and examined.  No interval change.

## 2025-05-02 NOTE — ANESTHESIA POSTPROCEDURE EVALUATION
Department of Anesthesiology  Postprocedure Note    Patient: Barry Frederick II  MRN: [id number]  YOB: 1960  Date of evaluation: 5/2/2025    Procedure Summary       Date: 05/02/25 Room / Location: [place] [address] / [facility] ENDOSCOPY    Anesthesia Start: 1034 Anesthesia Stop: 1133    Procedures:       ESOPHAGOGASTRODUODENOSCOPY WITH BIOPSY (Upper GI Region)      COLONOSCOPY DIAGNOSTIC; POLYPECTOMY; CLIP PLACEMENT x2; (Lower GI Region) Diagnosis:       Eosinophilic esophagitis      Gastroesophageal reflux disease without esophagitis      Hx of colonic polyps      (Eosinophilic esophagitis [K20.0])      (Gastroesophageal reflux disease without esophagitis [K21.9])      (Hx of colonic polyps [Z86.0100])    Surgeons: Dalila Fields MD Responsible Provider: Jeffery Varela MD    Anesthesia Type: MAC ASA Status: 3            Anesthesia Type: MAC    Waylon Phase I: Waylon Score: 10    Waylon Phase II: Waylon Score: 10    Anesthesia Post Evaluation    Patient location during evaluation: PACU  Patient participation: complete - patient participated  Level of consciousness: awake and alert  Airway patency: patent  Nausea & Vomiting: no nausea and no vomiting  Cardiovascular status: blood pressure returned to baseline  Respiratory status: acceptable  Hydration status: euvolemic  Pain management: adequate    No notable events documented.

## (undated) DEVICE — SOLIDIFIER FLD 2OZ 1500CC N DISINF IN BTL DISP SAFESORB

## (undated) DEVICE — INFECTION CONTROL KIT SYS

## (undated) DEVICE — SUT PROL 5-0 36IN RB1 DA BLU --

## (undated) DEVICE — DRAIN SURG L3/8-1/2IN DIA3/16IN SIL CARD CONN 1:1 BLAK

## (undated) DEVICE — MOUTHPIECE ENDOSCP L CTRL OPN AND SIDE PORTS DISP

## (undated) DEVICE — STOPCOCK IV 4 W TRNSPAR

## (undated) DEVICE — DRAPE FLD WRM W44XL66IN C6L FOR INTRATEMP SYS THERMABASIN

## (undated) DEVICE — CONNECTOR DRNGE W3/8-0.5XH3/16XL3/16IN 2:1 SIL CARD STR

## (undated) DEVICE — SUTURE SZ 7 L18IN NONABSORBABLE SIL CCS L48MM 1/2 CIR STRNM M655G

## (undated) DEVICE — SYSTEM TISS GLUE 5ML CONTAIN SYR PLUNG STD SYR TIP 12MM 5PKS/EA

## (undated) DEVICE — BASIN EMSIS 16OZ GRAPHITE PLAS KID SHP MOLD GRAD FOR ORAL

## (undated) DEVICE — YANKAUER,TAPERED BULBOUS TIP,W/O VENT: Brand: MEDLINE

## (undated) DEVICE — STERILE POLYISOPRENE POWDER-FREE SURGICAL GLOVES: Brand: PROTEXIS

## (undated) DEVICE — TUBING, SUCTION, 1/4" X 12', STRAIGHT: Brand: MEDLINE

## (undated) DEVICE — SUTURE MCRYL SZ 3-0 L27IN ABSRB UD L19MM PS-2 3/8 CIR PRIM Y427H

## (undated) DEVICE — COR-KNOT® MIS DEVICE KIT: Brand: COR-KNOT®

## (undated) DEVICE — SYR 20ML LL STRL LF --

## (undated) DEVICE — CATHETER PH SUCT 14FR

## (undated) DEVICE — TRANSFER PK BLD PROD 300ML --

## (undated) DEVICE — EJECTOR SALIVA 6 IN FLX CLR

## (undated) DEVICE — ELECTRODE,RADIOTRANSLUCENT,FOAM,5PK: Brand: MEDLINE

## (undated) DEVICE — SYR POWER 150ML 8IN FILL TUBE --

## (undated) DEVICE — FCPS BIOP PULM RAD JAW 100CML -- BX/10 M00515180

## (undated) DEVICE — INSERT SUT HLD F/OCTBS RETRCTR --

## (undated) DEVICE — CANNULA CUSH AD W/ 14FT TBG

## (undated) DEVICE — KIT ANGIOGRAPHY CUST MRMC

## (undated) DEVICE — GUIDEWIRE VASC L145CM 0.035IN J TIP L3MM PTFE FIX COR NAMIC

## (undated) DEVICE — FALCON® SAMPLE CONTAINER, WITH LID, 4.5OZ (110ML), INDIVIDUALLY WRAPPED, STERILE, 100/CASE: Brand: FALCON A CORNING BRAND

## (undated) DEVICE — ANGIO-SEAL VIP VASCULAR CLOSURE DEVICE: Brand: ANGIO-SEAL

## (undated) DEVICE — YANKAUER,BULB TIP,W/O VENT,RIGID,STERILE: Brand: MEDLINE

## (undated) DEVICE — DRAIN,WOUND,ROUND,24FR,5/16",FULL-FLUTED: Brand: MEDLINE

## (undated) DEVICE — SOLUTION IV 500ML 0.9% SOD CHL FLX CONT

## (undated) DEVICE — CATHETER ANGIO AR MOD 038 5 FRX100 CM 3.3 CM PERFORMA

## (undated) DEVICE — SOLUTION IV 1000ML 0.9% SOD CHL

## (undated) DEVICE — Device: Brand: SINGLE USE ELECTROSURGICAL SNARE SD-400

## (undated) DEVICE — CANNULA AORT ROOT INTRO STD TIP 5FR OVERALL LEN 55IN DLP

## (undated) DEVICE — PREP SKN CHLRAPRP APL 26ML STR --

## (undated) DEVICE — SYR LR LCK 1ML GRAD NSAF 30ML --

## (undated) DEVICE — SUTURE PROL SZ 6-0 L30IN NONABSORBABLE BLU L13MM RB-2 1/2 8711H

## (undated) DEVICE — PRESSURE MONITORING SET: Brand: TRUWAVE

## (undated) DEVICE — CATHETER ANGIO L100CM OD5FR ID.046IN L2.5CM .038IN PROG R

## (undated) DEVICE — NEEDLE HYPO 18GA L1.5IN PNK S STL HUB POLYPR SHLD REG BVL

## (undated) DEVICE — PINNACLE INTRODUCER SHEATH: Brand: PINNACLE

## (undated) DEVICE — DRSG BORDR MPLX HEEL 8.7X9.1IN --

## (undated) DEVICE — SYRINGE 50ML E/T

## (undated) DEVICE — BAG RED 3PLY 2MIL 30X40 IN

## (undated) DEVICE — Device

## (undated) DEVICE — COVER,TABLE,60X90,STERILE: Brand: MEDLINE

## (undated) DEVICE — KENDALL RADIOLUCENT FOAM MONITORING ELECTRODE RECTANGULAR SHAPE: Brand: KENDALL

## (undated) DEVICE — SYR 10ML LUER LOK 1/5ML GRAD --

## (undated) DEVICE — SUTURE TICRON DBL ARMED 2 0 CV 305 42IN BLU N ABSRB BRAID 8886303551

## (undated) DEVICE — CANNULA PERF 15FR L12.5IN RG STPCOCK WIREWOUND BODY

## (undated) DEVICE — SOLUTION IRRIG 1000ML H2O STRL BLT

## (undated) DEVICE — SUT PROL 4-0 36IN RB1 DA BLU --

## (undated) DEVICE — PACK PROCEDURE SURG HRT CATH

## (undated) DEVICE — ENDO CARRY-ON PROCEDURE KIT INCLUDES ENZYMATIC SPONGE, GAUZE, BIOHAZARD LABEL, TRAY, LUBRICANT, DIRTY SCOPE LABEL, WATER LABEL, TRAY, DRAWSTRING PAD, AND DEFENDO 4-PIECE KIT.: Brand: ENDO CARRY-ON PROCEDURE KIT

## (undated) DEVICE — STERNUM BLADE, OFFSET (31.7 X 0.64 X 6.3MM)

## (undated) DEVICE — CATHETER IV 22GA L1IN BLU POLYUR STR HUB RADPQ PROTCT +

## (undated) DEVICE — SINGLE USE BIOPSY VALVE MAJ-210: Brand: SINGLE USE BIOPSY VALVE (STERILE)

## (undated) DEVICE — STRAP,POSITIONING,KNEE/BODY,FOAM,4X60": Brand: MEDLINE

## (undated) DEVICE — (D)ATOMIZER LARYNGO TRACHAEL -- DISC BY MFR NO SUB

## (undated) DEVICE — DRAPE PRB US TRNSDCR 6X96IN --

## (undated) DEVICE — HANDLE LT SNAP ON ULT DURABLE LENS FOR TRUMPF ALC DISPOSABLE

## (undated) DEVICE — SPONGE GZ W4XL4IN COT 12 PLY TYP VII WVN C FLD DSGN

## (undated) DEVICE — CATH IV AUTOGRD BC PNK 20GA 25 -- INSYTE

## (undated) DEVICE — 72" ARTERIAL PRESSURE TUBING: Brand: ICU MEDICAL

## (undated) DEVICE — SYR ART 700 CLEAR MARK 7 -- ARTERION

## (undated) DEVICE — SOLUTION IV 1000ML PH 7.4 INJ NRMSOL R

## (undated) DEVICE — SUT SLK 2 60IN TIE MP BLK --

## (undated) DEVICE — DRESSING HEMOSTATIC SFT INTVENT W/O SLT DBL WRP QUIKCLOT LF

## (undated) DEVICE — TRAP SPEC POLYP REM STRNR CLN DSGN MAGNIFYING WIND DISP

## (undated) DEVICE — 1200 GUARD II KIT W/5MM TUBE W/O VAC TUBE: Brand: GUARDIAN

## (undated) DEVICE — 3M™ MEDIPORE™ H SOFT CLOTH SURGICAL TAPE 2864, 4 INCH X 10 YARD (10CM X 9,14M), 12 ROLLS/CASE: Brand: 3M™ MEDIPORE™

## (undated) DEVICE — ERBE NESSY®PLATE 170 SPLIT; 168CM²; CABLE 3M: Brand: ERBE

## (undated) DEVICE — 4-PORT MANIFOLD: Brand: NEPTUNE 2

## (undated) DEVICE — SUTURE VCRL SZ 0 L18IN ABSRB VLT L36MM CT-1 1/2 CIR J740D

## (undated) DEVICE — CATHETER IV 14GA L2IN POLYUR STR ORNG HUB SFTY RADPQ DISP

## (undated) DEVICE — GOWN,SIRUS,NONRNF,SETINSLV,XL,20/CS: Brand: MEDLINE

## (undated) DEVICE — TOURNIQUET PHLEB W1XL18IN BLU FLAT RL AND BND REUSE FOR IV

## (undated) DEVICE — GUIDEWIRE ANGIO L150CM OD0.035IN STR FIX PTFE SLD SUPP

## (undated) DEVICE — TTL1LYR 16FR10ML 100%SIL TMPST TR: Brand: MEDLINE

## (undated) DEVICE — NEEDLE SYRINGE 18GA L1.5IN RED PLAS HUB S STL BLNT FILL W/O

## (undated) DEVICE — TORCON NB, ADVANTAGE CATHETER: Brand: TORCON NB

## (undated) DEVICE — WRISTBAND ID AD W2.5XL9.5CM RED VYN ADH CLSR UNI-PRINT 655-16-PDJ

## (undated) DEVICE — REM POLYHESIVE ADULT PATIENT RETURN ELECTRODE: Brand: VALLEYLAB

## (undated) DEVICE — SYRINGE MED 50ML LUERSLIP TIP

## (undated) DEVICE — DRAPE SLUSH DISC W44XL66IN ST FOR RND BSIN HUSH SLUSH SYS

## (undated) DEVICE — SUTURE PROL SZ 6-0 L24IN NONABSORBABLE BLU L13MM C-1 3/8 8726H

## (undated) DEVICE — SYR 3ML LL TIP 1/10ML GRAD --

## (undated) DEVICE — Z DISCONTINUED PER MEDLINE LINE GAS SAMPLING O2/CO2 LNG AD 13 FT NSL W/ TBNG FILTERLINE

## (undated) DEVICE — NEONATAL-ADULT SPO2 SENSOR: Brand: NELLCOR

## (undated) DEVICE — TOWEL 4 PLY TISS 19X30 SUE WHT

## (undated) DEVICE — SYRINGE MED 20ML STD CLR PLAS LUERSLIP TIP N CTRL DISP

## (undated) DEVICE — COR-KNOT MINI® COMBO KITBASE PACKAGE TYPE - KITEACH STERILE PACKAGE KIT CONTAINS (2) SINGLE PATIENT USE COR-KNOT MINI® DEVICES AND (12) COR-KNOT® QUICK LOADS®.: Brand: COR-KNOT MINI®

## (undated) DEVICE — SINGLE USE SUCTION VALVE MAJ-209: Brand: SINGLE USE SUCTION VALVE (STERILE)

## (undated) DEVICE — BLUNTFILL: Brand: MONOJECT

## (undated) DEVICE — SUTURE ETHBND EXCEL SZ 3-0 L36IN NONABSORBABLE GRN BB L17MM X588H

## (undated) DEVICE — SUTURE PROL SZ 4-0 L36IN NONABSORBABLE BLU L26MM SH 1/2 CIR 8521H

## (undated) DEVICE — AGENT HEMSTAT W4XL4IN OXIDIZED REGENERATED CELOS ABSRB SFT

## (undated) DEVICE — FORCEPS BX CAP 240CM L RAD JAW 4

## (undated) DEVICE — MEDI-TRACE CADENCE ADULT, DEFIBRILLATION ELECTRODE -RTS  (10 PR/PK) - PHILIPS: Brand: MEDI-TRACE CADENCE

## (undated) DEVICE — SYRINGE MEDICAL 3ML CLEAR PLASTIC STANDARD NON CONTROL LUERLOCK TIP DISPOSABLE

## (undated) DEVICE — 3M™ TEGADERM™ TRANSPARENT FILM DRESSING FRAME STYLE, 1626W, 4 IN X 4-3/4 IN (10 CM X 12 CM), 50/CT 4CT/CASE: Brand: 3M™ TEGADERM™

## (undated) DEVICE — SET PERF L203CM 12IN RED AND BLU AORT ROOT MULT SLIP CONN

## (undated) DEVICE — SPONGE GZ W4XL4IN COT RADPQ HIGHLY ABSRB

## (undated) DEVICE — SUT ETHBND 2-0 30IN V5 GRN-WHT --

## (undated) DEVICE — SUMP PERICARD AD 20FR WT TIP VERSATILE DSGN MULT PRT VENT

## (undated) DEVICE — KIT,1200CC CANISTER,3/16"X6' TUBING: Brand: MEDLINE INDUSTRIES, INC.

## (undated) DEVICE — SOLUTION IV 1000ML 20MEQ K CHL IN 0.9% SOD CHL FLX CONT

## (undated) DEVICE — LABEL MED CARD MRMC STRL

## (undated) DEVICE — DRESSING SIL W4XL5IN ANTIBACT GELLING FBR CYTOFORM

## (undated) DEVICE — TUBING, SUCTION, 1/4" X 10', STRAIGHT: Brand: MEDLINE

## (undated) DEVICE — SYRINGE MED 5ML STD CLR PLAS LUERLOCK TIP N CTRL DISP

## (undated) DEVICE — BLANKET WRM W25XL64IN NONWOVEN SFT LTWT PLIABLE HYPR

## (undated) DEVICE — SET ADMIN 16ML TBNG L100IN 2 Y INJ SITE IV PIGGY BK DISP

## (undated) DEVICE — CATHETER ETER CARD MULTIPAK MULTIPAK 5FR PERFORMA

## (undated) DEVICE — SYR 50ML LR LCK 1ML GRAD NSAF --

## (undated) DEVICE — 3M™ IOBAN™ 2 ANTIMICROBIAL INCISE DRAPE 6648EZ: Brand: IOBAN™ 2